# Patient Record
Sex: FEMALE | Race: WHITE | NOT HISPANIC OR LATINO | Employment: OTHER | URBAN - NONMETROPOLITAN AREA
[De-identification: names, ages, dates, MRNs, and addresses within clinical notes are randomized per-mention and may not be internally consistent; named-entity substitution may affect disease eponyms.]

---

## 2018-01-01 ENCOUNTER — APPOINTMENT (OUTPATIENT)
Dept: GENERAL RADIOLOGY | Facility: HOSPITAL | Age: 83
End: 2018-01-01

## 2018-01-01 ENCOUNTER — HOSPITAL ENCOUNTER (INPATIENT)
Facility: HOSPITAL | Age: 83
LOS: 5 days | Discharge: NURSING, REHAB OR LONG-TERM CARE | End: 2018-10-19
Attending: FAMILY MEDICINE | Admitting: HOSPITALIST

## 2018-01-01 ENCOUNTER — APPOINTMENT (OUTPATIENT)
Dept: CARDIOLOGY | Facility: HOSPITAL | Age: 83
End: 2018-01-01
Attending: INTERNAL MEDICINE

## 2018-01-01 VITALS
BODY MASS INDEX: 18.48 KG/M2 | DIASTOLIC BLOOD PRESSURE: 64 MMHG | RESPIRATION RATE: 20 BRPM | TEMPERATURE: 98.1 F | WEIGHT: 104.3 LBS | SYSTOLIC BLOOD PRESSURE: 155 MMHG | HEART RATE: 76 BPM | OXYGEN SATURATION: 96 % | HEIGHT: 63 IN

## 2018-01-01 DIAGNOSIS — J96.21 ACUTE ON CHRONIC RESPIRATORY FAILURE WITH HYPOXIA (HCC): Primary | ICD-10-CM

## 2018-01-01 DIAGNOSIS — R13.12 OROPHARYNGEAL DYSPHAGIA: ICD-10-CM

## 2018-01-01 DIAGNOSIS — Z78.9 IMPAIRED MOBILITY AND ADLS: ICD-10-CM

## 2018-01-01 DIAGNOSIS — I21.4 NSTEMI (NON-ST ELEVATED MYOCARDIAL INFARCTION) (HCC): ICD-10-CM

## 2018-01-01 DIAGNOSIS — Z74.09 IMPAIRED FUNCTIONAL MOBILITY, BALANCE, GAIT, AND ENDURANCE: ICD-10-CM

## 2018-01-01 DIAGNOSIS — Z74.09 IMPAIRED MOBILITY AND ADLS: ICD-10-CM

## 2018-01-01 DIAGNOSIS — J18.9 PNEUMONIA DUE TO INFECTIOUS ORGANISM, UNSPECIFIED LATERALITY, UNSPECIFIED PART OF LUNG: ICD-10-CM

## 2018-01-01 LAB
ALBUMIN SERPL-MCNC: 3.7 G/DL (ref 3.4–4.8)
ALBUMIN SERPL-MCNC: 4 G/DL (ref 3.4–4.8)
ALBUMIN/GLOB SERPL: 1.1 G/DL (ref 1.1–1.8)
ALBUMIN/GLOB SERPL: 1.1 G/DL (ref 1.1–1.8)
ALP SERPL-CCNC: 154 U/L (ref 38–126)
ALP SERPL-CCNC: 166 U/L (ref 38–126)
ALT SERPL W P-5'-P-CCNC: 60 U/L (ref 9–52)
ALT SERPL W P-5'-P-CCNC: 68 U/L (ref 9–52)
ANION GAP SERPL CALCULATED.3IONS-SCNC: 10 MMOL/L (ref 5–15)
ANION GAP SERPL CALCULATED.3IONS-SCNC: 12 MMOL/L (ref 5–15)
ANION GAP SERPL CALCULATED.3IONS-SCNC: 5 MMOL/L (ref 5–15)
ARTERIAL PATENCY WRIST A: ABNORMAL
AST SERPL-CCNC: 51 U/L (ref 14–36)
AST SERPL-CCNC: 56 U/L (ref 14–36)
ATMOSPHERIC PRESS: 748 MMHG
B PERT DNA SPEC QL NAA+PROBE: NOT DETECTED
BACTERIA SPEC AEROBE CULT: NORMAL
BACTERIA SPEC AEROBE CULT: NORMAL
BASE EXCESS BLDA CALC-SCNC: -0.3 MMOL/L (ref 0–2)
BASOPHILS # BLD AUTO: 0.01 10*3/MM3 (ref 0–0.2)
BASOPHILS # BLD AUTO: 0.05 10*3/MM3 (ref 0–0.2)
BASOPHILS NFR BLD AUTO: 0.1 % (ref 0–2)
BASOPHILS NFR BLD AUTO: 0.4 % (ref 0–2)
BDY SITE: ABNORMAL
BH CV ECHO MEAS - ACS: 1.1 CM
BH CV ECHO MEAS - AI DEC SLOPE: 134 CM/SEC^2
BH CV ECHO MEAS - AI MAX PG: 34.8 MMHG
BH CV ECHO MEAS - AI MAX VEL: 295 CM/SEC
BH CV ECHO MEAS - AI P1/2T: 644.8 MSEC
BH CV ECHO MEAS - AO MAX PG (FULL): 31.3 MMHG
BH CV ECHO MEAS - AO MAX PG: 47.3 MMHG
BH CV ECHO MEAS - AO MEAN PG (FULL): 19 MMHG
BH CV ECHO MEAS - AO MEAN PG: 25 MMHG
BH CV ECHO MEAS - AO ROOT AREA (BSA CORRECTED): 1.7
BH CV ECHO MEAS - AO ROOT AREA: 4.9 CM^2
BH CV ECHO MEAS - AO ROOT DIAM: 2.5 CM
BH CV ECHO MEAS - AO V2 MAX: 344 CM/SEC
BH CV ECHO MEAS - AO V2 MEAN: 222 CM/SEC
BH CV ECHO MEAS - AO V2 VTI: 63.2 CM
BH CV ECHO MEAS - ASC AORTA: 3.2 CM
BH CV ECHO MEAS - AVA(I,A): 1.9 CM^2
BH CV ECHO MEAS - AVA(I,D): 1.9 CM^2
BH CV ECHO MEAS - AVA(V,A): 2 CM^2
BH CV ECHO MEAS - AVA(V,D): 2 CM^2
BH CV ECHO MEAS - BSA(HAYCOCK): 1.4 M^2
BH CV ECHO MEAS - BSA: 1.5 M^2
BH CV ECHO MEAS - BZI_BMI: 18.6 KILOGRAMS/M^2
BH CV ECHO MEAS - BZI_METRIC_HEIGHT: 160 CM
BH CV ECHO MEAS - BZI_METRIC_WEIGHT: 47.6 KG
BH CV ECHO MEAS - EDV(CUBED): 35.9 ML
BH CV ECHO MEAS - EDV(TEICH): 44.1 ML
BH CV ECHO MEAS - EF(CUBED): 92.2 %
BH CV ECHO MEAS - EF(TEICH): 88.3 %
BH CV ECHO MEAS - ESV(CUBED): 2.8 ML
BH CV ECHO MEAS - ESV(TEICH): 5.2 ML
BH CV ECHO MEAS - FS: 57.3 %
BH CV ECHO MEAS - IVS/LVPW: 0.93
BH CV ECHO MEAS - IVSD: 1.6 CM
BH CV ECHO MEAS - LA DIMENSION: 3.7 CM
BH CV ECHO MEAS - LA/AO: 1.5
BH CV ECHO MEAS - LV MASS(C)D: 217.6 GRAMS
BH CV ECHO MEAS - LV MASS(C)DI: 148 GRAMS/M^2
BH CV ECHO MEAS - LV MAX PG: 16 MMHG
BH CV ECHO MEAS - LV MEAN PG: 6 MMHG
BH CV ECHO MEAS - LV V1 MAX: 200 CM/SEC
BH CV ECHO MEAS - LV V1 MEAN: 108 CM/SEC
BH CV ECHO MEAS - LV V1 VTI: 34.5 CM
BH CV ECHO MEAS - LVIDD: 3.3 CM
BH CV ECHO MEAS - LVIDS: 1.4 CM
BH CV ECHO MEAS - LVOT AREA (M): 3.5 CM^2
BH CV ECHO MEAS - LVOT AREA: 3.5 CM^2
BH CV ECHO MEAS - LVOT DIAM: 2.1 CM
BH CV ECHO MEAS - LVPWD: 1.8 CM
BH CV ECHO MEAS - MR MAX PG: 67.9 MMHG
BH CV ECHO MEAS - MR MAX VEL: 412 CM/SEC
BH CV ECHO MEAS - MV E MAX VEL: 146 CM/SEC
BH CV ECHO MEAS - MV MAX PG: 9.4 MMHG
BH CV ECHO MEAS - MV MEAN PG: 3 MMHG
BH CV ECHO MEAS - MV V2 MAX: 153 CM/SEC
BH CV ECHO MEAS - MV V2 MEAN: 68.1 CM/SEC
BH CV ECHO MEAS - MV V2 VTI: 38.5 CM
BH CV ECHO MEAS - MVA(VTI): 3.1 CM^2
BH CV ECHO MEAS - PA MAX PG: 2.7 MMHG
BH CV ECHO MEAS - PA V2 MAX: 82 CM/SEC
BH CV ECHO MEAS - RAP SYSTOLE: 10 MMHG
BH CV ECHO MEAS - RVDD: 2.4 CM
BH CV ECHO MEAS - RVSP: 71.2 MMHG
BH CV ECHO MEAS - SI(AO): 211 ML/M^2
BH CV ECHO MEAS - SI(CUBED): 22.5 ML/M^2
BH CV ECHO MEAS - SI(LVOT): 81.3 ML/M^2
BH CV ECHO MEAS - SI(TEICH): 26.5 ML/M^2
BH CV ECHO MEAS - SV(AO): 310.2 ML
BH CV ECHO MEAS - SV(CUBED): 33.1 ML
BH CV ECHO MEAS - SV(LVOT): 119.5 ML
BH CV ECHO MEAS - SV(TEICH): 39 ML
BH CV ECHO MEAS - TR MAX VEL: 391 CM/SEC
BILIRUB SERPL-MCNC: 0.7 MG/DL (ref 0.2–1.3)
BILIRUB SERPL-MCNC: 0.8 MG/DL (ref 0.2–1.3)
BUN BLD-MCNC: 20 MG/DL (ref 7–21)
BUN BLD-MCNC: 28 MG/DL (ref 7–21)
BUN BLD-MCNC: 31 MG/DL (ref 7–21)
BUN/CREAT SERPL: 22 (ref 7–25)
BUN/CREAT SERPL: 28.7 (ref 7–25)
BUN/CREAT SERPL: 29.2 (ref 7–25)
C PNEUM DNA NPH QL NAA+NON-PROBE: NOT DETECTED
CALCIUM SPEC-SCNC: 8.8 MG/DL (ref 8.4–10.2)
CALCIUM SPEC-SCNC: 9.3 MG/DL (ref 8.4–10.2)
CALCIUM SPEC-SCNC: 9.4 MG/DL (ref 8.4–10.2)
CHLORIDE SERPL-SCNC: 94 MMOL/L (ref 95–110)
CHLORIDE SERPL-SCNC: 97 MMOL/L (ref 95–110)
CHLORIDE SERPL-SCNC: 97 MMOL/L (ref 95–110)
CK MB SERPL-CCNC: 1.88 NG/ML (ref 0–5)
CK SERPL-CCNC: 38 U/L (ref 30–135)
CO2 SERPL-SCNC: 26 MMOL/L (ref 22–31)
CO2 SERPL-SCNC: 29 MMOL/L (ref 22–31)
CO2 SERPL-SCNC: 35 MMOL/L (ref 22–31)
CREAT BLD-MCNC: 0.91 MG/DL (ref 0.5–1)
CREAT BLD-MCNC: 0.96 MG/DL (ref 0.5–1)
CREAT BLD-MCNC: 1.08 MG/DL (ref 0.5–1)
DEPRECATED RDW RBC AUTO: 45.9 FL (ref 36.4–46.3)
DEPRECATED RDW RBC AUTO: 46.3 FL (ref 36.4–46.3)
EOSINOPHIL # BLD AUTO: 0 10*3/MM3 (ref 0–0.7)
EOSINOPHIL # BLD AUTO: 0.07 10*3/MM3 (ref 0–0.7)
EOSINOPHIL NFR BLD AUTO: 0 % (ref 0–7)
EOSINOPHIL NFR BLD AUTO: 0.6 % (ref 0–7)
ERYTHROCYTE [DISTWIDTH] IN BLOOD BY AUTOMATED COUNT: 14 % (ref 11.5–14.5)
ERYTHROCYTE [DISTWIDTH] IN BLOOD BY AUTOMATED COUNT: 14 % (ref 11.5–14.5)
FLUAV H1 2009 PAND RNA NPH QL NAA+PROBE: NOT DETECTED
FLUAV H1 HA GENE NPH QL NAA+PROBE: NOT DETECTED
FLUAV H3 RNA NPH QL NAA+PROBE: NOT DETECTED
FLUAV SUBTYP SPEC NAA+PROBE: NOT DETECTED
FLUBV RNA ISLT QL NAA+PROBE: NOT DETECTED
GAS FLOW AIRWAY: 5 LPM
GFR SERPL CREATININE-BSD FRML MDRD: 47 ML/MIN/1.73 (ref 39–90)
GFR SERPL CREATININE-BSD FRML MDRD: 54 ML/MIN/1.73 (ref 39–90)
GFR SERPL CREATININE-BSD FRML MDRD: 58 ML/MIN/1.73 (ref 39–90)
GLOBULIN UR ELPH-MCNC: 3.4 GM/DL (ref 2.3–3.5)
GLOBULIN UR ELPH-MCNC: 3.5 GM/DL (ref 2.3–3.5)
GLUCOSE BLD-MCNC: 138 MG/DL (ref 60–100)
GLUCOSE BLD-MCNC: 172 MG/DL (ref 60–100)
GLUCOSE BLD-MCNC: 213 MG/DL (ref 60–100)
HADV DNA SPEC NAA+PROBE: NOT DETECTED
HCO3 BLDA-SCNC: 26.6 MMOL/L (ref 20–26)
HCOV 229E RNA SPEC QL NAA+PROBE: NOT DETECTED
HCOV HKU1 RNA SPEC QL NAA+PROBE: NOT DETECTED
HCOV NL63 RNA SPEC QL NAA+PROBE: NOT DETECTED
HCOV OC43 RNA SPEC QL NAA+PROBE: NOT DETECTED
HCT VFR BLD AUTO: 37.1 % (ref 35–45)
HCT VFR BLD AUTO: 38.3 % (ref 35–45)
HGB BLD-MCNC: 12 G/DL (ref 12–15.5)
HGB BLD-MCNC: 12.6 G/DL (ref 12–15.5)
HMPV RNA NPH QL NAA+NON-PROBE: NOT DETECTED
HOLD SPECIMEN: NORMAL
HOLD SPECIMEN: NORMAL
HPIV1 RNA SPEC QL NAA+PROBE: NOT DETECTED
HPIV2 RNA SPEC QL NAA+PROBE: NOT DETECTED
HPIV3 RNA NPH QL NAA+PROBE: NOT DETECTED
HPIV4 P GENE NPH QL NAA+PROBE: NOT DETECTED
IMM GRANULOCYTES # BLD: 0.01 10*3/MM3 (ref 0–0.02)
IMM GRANULOCYTES # BLD: 0.02 10*3/MM3 (ref 0–0.02)
IMM GRANULOCYTES NFR BLD: 0.1 % (ref 0–0.5)
IMM GRANULOCYTES NFR BLD: 0.2 % (ref 0–0.5)
INR PPP: 1.04 (ref 0.8–1.2)
L PNEUMO1 AG UR QL IA: NEGATIVE
LYMPHOCYTES # BLD AUTO: 0.7 10*3/MM3 (ref 0.6–4.2)
LYMPHOCYTES # BLD AUTO: 1.38 10*3/MM3 (ref 0.6–4.2)
LYMPHOCYTES NFR BLD AUTO: 12.1 % (ref 10–50)
LYMPHOCYTES NFR BLD AUTO: 7.7 % (ref 10–50)
Lab: ABNORMAL
M PNEUMO IGG SER IA-ACNC: NOT DETECTED
MAGNESIUM SERPL-MCNC: 1.5 MG/DL (ref 1.6–2.3)
MAGNESIUM SERPL-MCNC: 2.7 MG/DL (ref 1.6–2.3)
MAXIMAL PREDICTED HEART RATE: 128 BPM
MCH RBC QN AUTO: 29.3 PG (ref 26.5–34)
MCH RBC QN AUTO: 29.9 PG (ref 26.5–34)
MCHC RBC AUTO-ENTMCNC: 32.3 G/DL (ref 31.4–36)
MCHC RBC AUTO-ENTMCNC: 32.9 G/DL (ref 31.4–36)
MCV RBC AUTO: 90.5 FL (ref 80–98)
MCV RBC AUTO: 90.8 FL (ref 80–98)
MODALITY: ABNORMAL
MONOCYTES # BLD AUTO: 0.66 10*3/MM3 (ref 0–0.9)
MONOCYTES # BLD AUTO: 0.9 10*3/MM3 (ref 0–0.9)
MONOCYTES NFR BLD AUTO: 7.3 % (ref 0–12)
MONOCYTES NFR BLD AUTO: 7.9 % (ref 0–12)
NEUTROPHILS # BLD AUTO: 7.66 10*3/MM3 (ref 2–8.6)
NEUTROPHILS # BLD AUTO: 8.96 10*3/MM3 (ref 2–8.6)
NEUTROPHILS NFR BLD AUTO: 78.9 % (ref 37–80)
NEUTROPHILS NFR BLD AUTO: 84.7 % (ref 37–80)
NRBC BLD MANUAL-RTO: 0 /100 WBC (ref 0–0)
NT-PROBNP SERPL-MCNC: 5970 PG/ML (ref 0–1800)
NT-PROBNP SERPL-MCNC: ABNORMAL PG/ML (ref 0–1800)
PCO2 BLDA: 52.2 MM HG (ref 35–45)
PH BLDA: 7.32 PH UNITS (ref 7.35–7.45)
PLATELET # BLD AUTO: 286 10*3/MM3 (ref 150–450)
PLATELET # BLD AUTO: 333 10*3/MM3 (ref 150–450)
PMV BLD AUTO: 8.9 FL (ref 8–12)
PMV BLD AUTO: 8.9 FL (ref 8–12)
PO2 BLDA: 115 MM HG (ref 83–108)
POTASSIUM BLD-SCNC: 3.5 MMOL/L (ref 3.5–5.1)
POTASSIUM BLD-SCNC: 4.1 MMOL/L (ref 3.5–5.1)
POTASSIUM BLD-SCNC: 5 MMOL/L (ref 3.5–5.1)
PROT SERPL-MCNC: 7.1 G/DL (ref 6.3–8.6)
PROT SERPL-MCNC: 7.5 G/DL (ref 6.3–8.6)
PROTHROMBIN TIME: 13.4 SECONDS (ref 11.1–15.3)
RBC # BLD AUTO: 4.1 10*6/MM3 (ref 3.77–5.16)
RBC # BLD AUTO: 4.22 10*6/MM3 (ref 3.77–5.16)
RHINOVIRUS RNA SPEC NAA+PROBE: NOT DETECTED
RSV RNA NPH QL NAA+NON-PROBE: NOT DETECTED
S PNEUM AG SPEC QL LA: NEGATIVE
SAO2 % BLDCOA: 98 % (ref 94–99)
SODIUM BLD-SCNC: 132 MMOL/L (ref 137–145)
SODIUM BLD-SCNC: 136 MMOL/L (ref 137–145)
SODIUM BLD-SCNC: 137 MMOL/L (ref 137–145)
STRESS TARGET HR: 109 BPM
TROPONIN I SERPL-MCNC: 0.05 NG/ML
TROPONIN I SERPL-MCNC: 0.1 NG/ML
VENTILATOR MODE: ABNORMAL
WBC NRBC COR # BLD: 11.37 10*3/MM3 (ref 3.2–9.8)
WBC NRBC COR # BLD: 9.05 10*3/MM3 (ref 3.2–9.8)
WHOLE BLOOD HOLD SPECIMEN: NORMAL
WHOLE BLOOD HOLD SPECIMEN: NORMAL

## 2018-01-01 PROCEDURE — 93306 TTE W/DOPPLER COMPLETE: CPT

## 2018-01-01 PROCEDURE — 97530 THERAPEUTIC ACTIVITIES: CPT

## 2018-01-01 PROCEDURE — 80048 BASIC METABOLIC PNL TOTAL CA: CPT | Performed by: INTERNAL MEDICINE

## 2018-01-01 PROCEDURE — G8988 SELF CARE GOAL STATUS: HCPCS

## 2018-01-01 PROCEDURE — 94799 UNLISTED PULMONARY SVC/PX: CPT

## 2018-01-01 PROCEDURE — 25010000002 MAGNESIUM SULFATE 2 GM/50ML SOLUTION: Performed by: INTERNAL MEDICINE

## 2018-01-01 PROCEDURE — 99285 EMERGENCY DEPT VISIT HI MDM: CPT

## 2018-01-01 PROCEDURE — 93005 ELECTROCARDIOGRAM TRACING: CPT | Performed by: FAMILY MEDICINE

## 2018-01-01 PROCEDURE — 83735 ASSAY OF MAGNESIUM: CPT | Performed by: INTERNAL MEDICINE

## 2018-01-01 PROCEDURE — 87040 BLOOD CULTURE FOR BACTERIA: CPT | Performed by: FAMILY MEDICINE

## 2018-01-01 PROCEDURE — 25010000002 FUROSEMIDE PER 20 MG: Performed by: INTERNAL MEDICINE

## 2018-01-01 PROCEDURE — 84484 ASSAY OF TROPONIN QUANT: CPT | Performed by: FAMILY MEDICINE

## 2018-01-01 PROCEDURE — G8996 SWALLOW CURRENT STATUS: HCPCS | Performed by: SPEECH-LANGUAGE PATHOLOGIST

## 2018-01-01 PROCEDURE — G8979 MOBILITY GOAL STATUS: HCPCS

## 2018-01-01 PROCEDURE — 94660 CPAP INITIATION&MGMT: CPT

## 2018-01-01 PROCEDURE — 82550 ASSAY OF CK (CPK): CPT | Performed by: INTERNAL MEDICINE

## 2018-01-01 PROCEDURE — 80053 COMPREHEN METABOLIC PANEL: CPT | Performed by: FAMILY MEDICINE

## 2018-01-01 PROCEDURE — 25010000002 LORAZEPAM PER 2 MG: Performed by: INTERNAL MEDICINE

## 2018-01-01 PROCEDURE — 87633 RESP VIRUS 12-25 TARGETS: CPT | Performed by: FAMILY MEDICINE

## 2018-01-01 PROCEDURE — 93010 ELECTROCARDIOGRAM REPORT: CPT | Performed by: INTERNAL MEDICINE

## 2018-01-01 PROCEDURE — 25010000002 LEVOFLOXACIN PER 250 MG: Performed by: FAMILY MEDICINE

## 2018-01-01 PROCEDURE — 97110 THERAPEUTIC EXERCISES: CPT

## 2018-01-01 PROCEDURE — 83880 ASSAY OF NATRIURETIC PEPTIDE: CPT | Performed by: INTERNAL MEDICINE

## 2018-01-01 PROCEDURE — 85025 COMPLETE CBC W/AUTO DIFF WBC: CPT | Performed by: FAMILY MEDICINE

## 2018-01-01 PROCEDURE — 25010000002 FUROSEMIDE PER 20 MG: Performed by: FAMILY MEDICINE

## 2018-01-01 PROCEDURE — 97166 OT EVAL MOD COMPLEX 45 MIN: CPT

## 2018-01-01 PROCEDURE — 85610 PROTHROMBIN TIME: CPT | Performed by: FAMILY MEDICINE

## 2018-01-01 PROCEDURE — G8978 MOBILITY CURRENT STATUS: HCPCS

## 2018-01-01 PROCEDURE — 93005 ELECTROCARDIOGRAM TRACING: CPT | Performed by: EMERGENCY MEDICINE

## 2018-01-01 PROCEDURE — 25010000002 HYDRALAZINE PER 20 MG: Performed by: FAMILY MEDICINE

## 2018-01-01 PROCEDURE — 97162 PT EVAL MOD COMPLEX 30 MIN: CPT

## 2018-01-01 PROCEDURE — G8998 SWALLOW D/C STATUS: HCPCS | Performed by: SPEECH-LANGUAGE PATHOLOGIST

## 2018-01-01 PROCEDURE — 94760 N-INVAS EAR/PLS OXIMETRY 1: CPT

## 2018-01-01 PROCEDURE — 94640 AIRWAY INHALATION TREATMENT: CPT

## 2018-01-01 PROCEDURE — 25010000002 ONDANSETRON PER 1 MG: Performed by: FAMILY MEDICINE

## 2018-01-01 PROCEDURE — 82803 BLOOD GASES ANY COMBINATION: CPT

## 2018-01-01 PROCEDURE — 36600 WITHDRAWAL OF ARTERIAL BLOOD: CPT

## 2018-01-01 PROCEDURE — 97535 SELF CARE MNGMENT TRAINING: CPT

## 2018-01-01 PROCEDURE — 36415 COLL VENOUS BLD VENIPUNCTURE: CPT | Performed by: FAMILY MEDICINE

## 2018-01-01 PROCEDURE — 87899 AGENT NOS ASSAY W/OPTIC: CPT | Performed by: FAMILY MEDICINE

## 2018-01-01 PROCEDURE — 97116 GAIT TRAINING THERAPY: CPT

## 2018-01-01 PROCEDURE — 92526 ORAL FUNCTION THERAPY: CPT | Performed by: SPEECH-LANGUAGE PATHOLOGIST

## 2018-01-01 PROCEDURE — 25010000002 PIPERACILLIN SOD-TAZOBACTAM PER 1 G: Performed by: FAMILY MEDICINE

## 2018-01-01 PROCEDURE — 83880 ASSAY OF NATRIURETIC PEPTIDE: CPT | Performed by: FAMILY MEDICINE

## 2018-01-01 PROCEDURE — 87581 M.PNEUMON DNA AMP PROBE: CPT | Performed by: FAMILY MEDICINE

## 2018-01-01 PROCEDURE — 87486 CHLMYD PNEUM DNA AMP PROBE: CPT | Performed by: FAMILY MEDICINE

## 2018-01-01 PROCEDURE — 83735 ASSAY OF MAGNESIUM: CPT | Performed by: HOSPITALIST

## 2018-01-01 PROCEDURE — 93306 TTE W/DOPPLER COMPLETE: CPT | Performed by: INTERNAL MEDICINE

## 2018-01-01 PROCEDURE — 82553 CREATINE MB FRACTION: CPT | Performed by: INTERNAL MEDICINE

## 2018-01-01 PROCEDURE — 92610 EVALUATE SWALLOWING FUNCTION: CPT | Performed by: SPEECH-LANGUAGE PATHOLOGIST

## 2018-01-01 PROCEDURE — 84484 ASSAY OF TROPONIN QUANT: CPT | Performed by: INTERNAL MEDICINE

## 2018-01-01 PROCEDURE — 71045 X-RAY EXAM CHEST 1 VIEW: CPT

## 2018-01-01 PROCEDURE — G8997 SWALLOW GOAL STATUS: HCPCS | Performed by: SPEECH-LANGUAGE PATHOLOGIST

## 2018-01-01 PROCEDURE — 87798 DETECT AGENT NOS DNA AMP: CPT | Performed by: FAMILY MEDICINE

## 2018-01-01 PROCEDURE — G8987 SELF CARE CURRENT STATUS: HCPCS

## 2018-01-01 RX ORDER — MAGNESIUM SULFATE HEPTAHYDRATE 40 MG/ML
2 INJECTION, SOLUTION INTRAVENOUS AS NEEDED
Status: DISCONTINUED | OUTPATIENT
Start: 2018-01-01 | End: 2018-01-01 | Stop reason: HOSPADM

## 2018-01-01 RX ORDER — HYDRALAZINE HYDROCHLORIDE 20 MG/ML
10 INJECTION INTRAMUSCULAR; INTRAVENOUS ONCE
Status: COMPLETED | OUTPATIENT
Start: 2018-01-01 | End: 2018-01-01

## 2018-01-01 RX ORDER — OMEPRAZOLE 20 MG/1
20 CAPSULE, DELAYED RELEASE ORAL DAILY
COMMUNITY

## 2018-01-01 RX ORDER — LEVOFLOXACIN 250 MG/1
250 TABLET ORAL EVERY OTHER DAY
Status: COMPLETED | OUTPATIENT
Start: 2018-01-01 | End: 2018-01-01

## 2018-01-01 RX ORDER — DIGOXIN 125 MCG
125 TABLET ORAL
Status: DISCONTINUED | OUTPATIENT
Start: 2018-01-01 | End: 2018-01-01 | Stop reason: HOSPADM

## 2018-01-01 RX ORDER — ASPIRIN 325 MG
325 TABLET ORAL ONCE
Status: COMPLETED | OUTPATIENT
Start: 2018-01-01 | End: 2018-01-01

## 2018-01-01 RX ORDER — DIGOXIN 125 MCG
125 TABLET ORAL
COMMUNITY

## 2018-01-01 RX ORDER — LISINOPRIL 5 MG/1
5 TABLET ORAL DAILY
Status: DISCONTINUED | OUTPATIENT
Start: 2018-01-01 | End: 2018-01-01 | Stop reason: HOSPADM

## 2018-01-01 RX ORDER — FUROSEMIDE 10 MG/ML
20 INJECTION INTRAMUSCULAR; INTRAVENOUS EVERY 12 HOURS
Status: DISCONTINUED | OUTPATIENT
Start: 2018-01-01 | End: 2018-01-01 | Stop reason: HOSPADM

## 2018-01-01 RX ORDER — LEVOFLOXACIN 5 MG/ML
500 INJECTION, SOLUTION INTRAVENOUS
Status: DISCONTINUED | OUTPATIENT
Start: 2018-01-01 | End: 2018-01-01

## 2018-01-01 RX ORDER — ONDANSETRON 2 MG/ML
4 INJECTION INTRAMUSCULAR; INTRAVENOUS ONCE
Status: COMPLETED | OUTPATIENT
Start: 2018-01-01 | End: 2018-01-01

## 2018-01-01 RX ORDER — MECLIZINE HCL 12.5 MG/1
12.5 TABLET ORAL 2 TIMES DAILY
Status: DISCONTINUED | OUTPATIENT
Start: 2018-01-01 | End: 2018-01-01 | Stop reason: HOSPADM

## 2018-01-01 RX ORDER — FUROSEMIDE 10 MG/ML
20 INJECTION INTRAMUSCULAR; INTRAVENOUS ONCE
Status: COMPLETED | OUTPATIENT
Start: 2018-01-01 | End: 2018-01-01

## 2018-01-01 RX ORDER — SODIUM CHLORIDE 0.9 % (FLUSH) 0.9 %
3 SYRINGE (ML) INJECTION EVERY 12 HOURS SCHEDULED
Status: DISCONTINUED | OUTPATIENT
Start: 2018-01-01 | End: 2018-01-01 | Stop reason: HOSPADM

## 2018-01-01 RX ORDER — MULTIPLE VITAMINS W/ MINERALS TAB 9MG-400MCG
1 TAB ORAL DAILY
COMMUNITY

## 2018-01-01 RX ORDER — IPRATROPIUM BROMIDE AND ALBUTEROL SULFATE 2.5; .5 MG/3ML; MG/3ML
3 SOLUTION RESPIRATORY (INHALATION)
Status: DISCONTINUED | OUTPATIENT
Start: 2018-01-01 | End: 2018-01-01 | Stop reason: SDUPTHER

## 2018-01-01 RX ORDER — LISINOPRIL 5 MG/1
5 TABLET ORAL DAILY
COMMUNITY

## 2018-01-01 RX ORDER — SODIUM CHLORIDE 9 MG/ML
INJECTION, SOLUTION INTRAVENOUS
Status: DISCONTINUED
Start: 2018-01-01 | End: 2018-01-01 | Stop reason: HOSPADM

## 2018-01-01 RX ORDER — ASPIRIN 81 MG/1
81 TABLET, CHEWABLE ORAL DAILY
COMMUNITY

## 2018-01-01 RX ORDER — LEVOTHYROXINE SODIUM 0.07 MG/1
37.5 TABLET ORAL DAILY
COMMUNITY

## 2018-01-01 RX ORDER — FUROSEMIDE 10 MG/ML
20 INJECTION INTRAMUSCULAR; INTRAVENOUS DAILY
Status: DISCONTINUED | OUTPATIENT
Start: 2018-01-01 | End: 2018-01-01

## 2018-01-01 RX ORDER — ATORVASTATIN CALCIUM 20 MG/1
20 TABLET, FILM COATED ORAL 3 TIMES WEEKLY
Status: DISCONTINUED | OUTPATIENT
Start: 2018-01-01 | End: 2018-01-01 | Stop reason: HOSPADM

## 2018-01-01 RX ORDER — IPRATROPIUM BROMIDE AND ALBUTEROL SULFATE 2.5; .5 MG/3ML; MG/3ML
3 SOLUTION RESPIRATORY (INHALATION)
Qty: 360 ML | Refills: 0 | Status: SHIPPED | OUTPATIENT
Start: 2018-01-01

## 2018-01-01 RX ORDER — LEVOFLOXACIN 5 MG/ML
250 INJECTION, SOLUTION INTRAVENOUS
Status: DISCONTINUED | OUTPATIENT
Start: 2018-01-01 | End: 2018-01-01 | Stop reason: SDUPTHER

## 2018-01-01 RX ORDER — LORAZEPAM 2 MG/ML
0.5 INJECTION INTRAMUSCULAR EVERY 8 HOURS PRN
Status: DISCONTINUED | OUTPATIENT
Start: 2018-01-01 | End: 2018-01-01 | Stop reason: HOSPADM

## 2018-01-01 RX ORDER — LEVOTHYROXINE SODIUM 0.07 MG/1
37.5 TABLET ORAL
Status: DISCONTINUED | OUTPATIENT
Start: 2018-01-01 | End: 2018-01-01 | Stop reason: HOSPADM

## 2018-01-01 RX ORDER — MECLIZINE HCL 12.5 MG/1
12.5 TABLET ORAL 2 TIMES DAILY
COMMUNITY

## 2018-01-01 RX ORDER — SODIUM CHLORIDE 0.9 % (FLUSH) 0.9 %
10 SYRINGE (ML) INJECTION AS NEEDED
Status: DISCONTINUED | OUTPATIENT
Start: 2018-01-01 | End: 2018-01-01 | Stop reason: SDUPTHER

## 2018-01-01 RX ORDER — MONTELUKAST SODIUM 10 MG/1
10 TABLET ORAL NIGHTLY
Status: DISCONTINUED | OUTPATIENT
Start: 2018-01-01 | End: 2018-01-01 | Stop reason: HOSPADM

## 2018-01-01 RX ORDER — LISINOPRIL 5 MG/1
5 TABLET ORAL ONCE
Status: COMPLETED | OUTPATIENT
Start: 2018-01-01 | End: 2018-01-01

## 2018-01-01 RX ORDER — SODIUM CHLORIDE 0.9 % (FLUSH) 0.9 %
3-10 SYRINGE (ML) INJECTION AS NEEDED
Status: DISCONTINUED | OUTPATIENT
Start: 2018-01-01 | End: 2018-01-01 | Stop reason: HOSPADM

## 2018-01-01 RX ORDER — CHLORAL HYDRATE 500 MG
1 CAPSULE ORAL DAILY
COMMUNITY

## 2018-01-01 RX ORDER — MAGNESIUM SULFATE HEPTAHYDRATE 40 MG/ML
4 INJECTION, SOLUTION INTRAVENOUS AS NEEDED
Status: DISCONTINUED | OUTPATIENT
Start: 2018-01-01 | End: 2018-01-01 | Stop reason: HOSPADM

## 2018-01-01 RX ORDER — IPRATROPIUM BROMIDE AND ALBUTEROL SULFATE 2.5; .5 MG/3ML; MG/3ML
3 SOLUTION RESPIRATORY (INHALATION)
Status: DISCONTINUED | OUTPATIENT
Start: 2018-01-01 | End: 2018-01-01 | Stop reason: HOSPADM

## 2018-01-01 RX ORDER — NITROGLYCERIN 0.4 MG/1
0.4 TABLET SUBLINGUAL
Status: DISCONTINUED | OUTPATIENT
Start: 2018-01-01 | End: 2018-01-01 | Stop reason: HOSPADM

## 2018-01-01 RX ORDER — PANTOPRAZOLE SODIUM 40 MG/1
40 TABLET, DELAYED RELEASE ORAL EVERY MORNING
Status: DISCONTINUED | OUTPATIENT
Start: 2018-01-01 | End: 2018-01-01 | Stop reason: HOSPADM

## 2018-01-01 RX ORDER — ASPIRIN 81 MG/1
81 TABLET, CHEWABLE ORAL DAILY
Status: DISCONTINUED | OUTPATIENT
Start: 2018-01-01 | End: 2018-01-01 | Stop reason: HOSPADM

## 2018-01-01 RX ORDER — ATORVASTATIN CALCIUM 20 MG/1
20 TABLET, FILM COATED ORAL 3 TIMES WEEKLY
COMMUNITY

## 2018-01-01 RX ORDER — MONTELUKAST SODIUM 10 MG/1
10 TABLET ORAL NIGHTLY
COMMUNITY

## 2018-01-01 RX ADMIN — NYSTATIN 500000 UNITS: 500000 SUSPENSION ORAL at 08:23

## 2018-01-01 RX ADMIN — SERTRALINE HYDROCHLORIDE 50 MG: 50 TABLET ORAL at 08:58

## 2018-01-01 RX ADMIN — MECLIZINE 12.5 MG: 12.5 TABLET ORAL at 21:55

## 2018-01-01 RX ADMIN — FUROSEMIDE 20 MG: 10 INJECTION, SOLUTION INTRAMUSCULAR; INTRAVENOUS at 20:20

## 2018-01-01 RX ADMIN — PANTOPRAZOLE SODIUM 40 MG: 40 TABLET, DELAYED RELEASE ORAL at 05:37

## 2018-01-01 RX ADMIN — Medication 3 ML: at 10:03

## 2018-01-01 RX ADMIN — IPRATROPIUM BROMIDE AND ALBUTEROL SULFATE 3 ML: 2.5; .5 SOLUTION RESPIRATORY (INHALATION) at 19:32

## 2018-01-01 RX ADMIN — IPRATROPIUM BROMIDE AND ALBUTEROL SULFATE 3 ML: 2.5; .5 SOLUTION RESPIRATORY (INHALATION) at 15:39

## 2018-01-01 RX ADMIN — LISINOPRIL 5 MG: 5 TABLET ORAL at 08:23

## 2018-01-01 RX ADMIN — Medication 3 ML: at 08:59

## 2018-01-01 RX ADMIN — Medication 3 ML: at 20:20

## 2018-01-01 RX ADMIN — IPRATROPIUM BROMIDE AND ALBUTEROL SULFATE 3 ML: 2.5; .5 SOLUTION RESPIRATORY (INHALATION) at 20:23

## 2018-01-01 RX ADMIN — IPRATROPIUM BROMIDE AND ALBUTEROL SULFATE 3 ML: 2.5; .5 SOLUTION RESPIRATORY (INHALATION) at 08:03

## 2018-01-01 RX ADMIN — FUROSEMIDE 20 MG: 10 INJECTION, SOLUTION INTRAMUSCULAR; INTRAVENOUS at 11:52

## 2018-01-01 RX ADMIN — IPRATROPIUM BROMIDE AND ALBUTEROL SULFATE 3 ML: 2.5; .5 SOLUTION RESPIRATORY (INHALATION) at 11:19

## 2018-01-01 RX ADMIN — MONTELUKAST SODIUM 10 MG: 10 TABLET, FILM COATED ORAL at 22:37

## 2018-01-01 RX ADMIN — MECLIZINE 12.5 MG: 12.5 TABLET ORAL at 08:23

## 2018-01-01 RX ADMIN — SERTRALINE HYDROCHLORIDE 50 MG: 50 TABLET ORAL at 10:02

## 2018-01-01 RX ADMIN — Medication 37.5 MCG: at 06:27

## 2018-01-01 RX ADMIN — LEVOFLOXACIN 250 MG: 250 TABLET, FILM COATED ORAL at 08:23

## 2018-01-01 RX ADMIN — MECLIZINE 12.5 MG: 12.5 TABLET ORAL at 20:40

## 2018-01-01 RX ADMIN — SERTRALINE HYDROCHLORIDE 50 MG: 50 TABLET ORAL at 08:23

## 2018-01-01 RX ADMIN — NYSTATIN 500000 UNITS: 500000 SUSPENSION ORAL at 18:55

## 2018-01-01 RX ADMIN — DIGOXIN 125 MCG: 125 TABLET ORAL at 12:50

## 2018-01-01 RX ADMIN — LORAZEPAM 0.5 MG: 2 INJECTION, SOLUTION INTRAMUSCULAR; INTRAVENOUS at 18:36

## 2018-01-01 RX ADMIN — NYSTATIN 500000 UNITS: 500000 SUSPENSION ORAL at 21:44

## 2018-01-01 RX ADMIN — MECLIZINE 12.5 MG: 12.5 TABLET ORAL at 20:19

## 2018-01-01 RX ADMIN — IPRATROPIUM BROMIDE AND ALBUTEROL SULFATE 3 ML: 2.5; .5 SOLUTION RESPIRATORY (INHALATION) at 14:15

## 2018-01-01 RX ADMIN — MECLIZINE 12.5 MG: 12.5 TABLET ORAL at 08:27

## 2018-01-01 RX ADMIN — NYSTATIN 500000 UNITS: 500000 SUSPENSION ORAL at 20:39

## 2018-01-01 RX ADMIN — FUROSEMIDE 20 MG: 10 INJECTION, SOLUTION INTRAMUSCULAR; INTRAVENOUS at 03:17

## 2018-01-01 RX ADMIN — MONTELUKAST SODIUM 10 MG: 10 TABLET, FILM COATED ORAL at 20:19

## 2018-01-01 RX ADMIN — IPRATROPIUM BROMIDE AND ALBUTEROL SULFATE 3 ML: 2.5; .5 SOLUTION RESPIRATORY (INHALATION) at 07:16

## 2018-01-01 RX ADMIN — NYSTATIN 500000 UNITS: 500000 SUSPENSION ORAL at 18:21

## 2018-01-01 RX ADMIN — MONTELUKAST SODIUM 10 MG: 10 TABLET, FILM COATED ORAL at 21:55

## 2018-01-01 RX ADMIN — LEVOFLOXACIN 250 MG: 250 TABLET, FILM COATED ORAL at 10:02

## 2018-01-01 RX ADMIN — MAGNESIUM SULFATE IN WATER 2 G: 40 INJECTION, SOLUTION INTRAVENOUS at 03:04

## 2018-01-01 RX ADMIN — NYSTATIN 500000 UNITS: 500000 SUSPENSION ORAL at 18:07

## 2018-01-01 RX ADMIN — Medication 3 ML: at 08:24

## 2018-01-01 RX ADMIN — IPRATROPIUM BROMIDE AND ALBUTEROL SULFATE 3 ML: 2.5; .5 SOLUTION RESPIRATORY (INHALATION) at 23:48

## 2018-01-01 RX ADMIN — ASPIRIN 81 MG CHEWABLE TABLET 81 MG: 81 TABLET CHEWABLE at 10:02

## 2018-01-01 RX ADMIN — MECLIZINE 12.5 MG: 12.5 TABLET ORAL at 08:58

## 2018-01-01 RX ADMIN — SERTRALINE HYDROCHLORIDE 50 MG: 50 TABLET ORAL at 08:19

## 2018-01-01 RX ADMIN — ATORVASTATIN CALCIUM 20 MG: 20 TABLET, FILM COATED ORAL at 12:21

## 2018-01-01 RX ADMIN — LISINOPRIL 5 MG: 5 TABLET ORAL at 10:03

## 2018-01-01 RX ADMIN — FUROSEMIDE 20 MG: 10 INJECTION, SOLUTION INTRAMUSCULAR; INTRAVENOUS at 20:40

## 2018-01-01 RX ADMIN — MAGNESIUM SULFATE IN WATER 2 G: 40 INJECTION, SOLUTION INTRAVENOUS at 04:53

## 2018-01-01 RX ADMIN — IPRATROPIUM BROMIDE AND ALBUTEROL SULFATE 3 ML: 2.5; .5 SOLUTION RESPIRATORY (INHALATION) at 13:52

## 2018-01-01 RX ADMIN — NYSTATIN 500000 UNITS: 500000 SUSPENSION ORAL at 08:27

## 2018-01-01 RX ADMIN — FUROSEMIDE 20 MG: 10 INJECTION, SOLUTION INTRAMUSCULAR; INTRAVENOUS at 20:39

## 2018-01-01 RX ADMIN — LISINOPRIL 5 MG: 5 TABLET ORAL at 08:19

## 2018-01-01 RX ADMIN — FUROSEMIDE 20 MG: 10 INJECTION, SOLUTION INTRAMUSCULAR; INTRAVENOUS at 08:23

## 2018-01-01 RX ADMIN — DIGOXIN 125 MCG: 125 TABLET ORAL at 13:00

## 2018-01-01 RX ADMIN — MECLIZINE 12.5 MG: 12.5 TABLET ORAL at 22:37

## 2018-01-01 RX ADMIN — IPRATROPIUM BROMIDE AND ALBUTEROL SULFATE 3 ML: 2.5; .5 SOLUTION RESPIRATORY (INHALATION) at 12:47

## 2018-01-01 RX ADMIN — LEVOFLOXACIN 500 MG: 5 INJECTION, SOLUTION INTRAVENOUS at 03:17

## 2018-01-01 RX ADMIN — Medication 3 ML: at 21:22

## 2018-01-01 RX ADMIN — PANTOPRAZOLE SODIUM 40 MG: 40 TABLET, DELAYED RELEASE ORAL at 05:25

## 2018-01-01 RX ADMIN — NYSTATIN 500000 UNITS: 500000 SUSPENSION ORAL at 08:58

## 2018-01-01 RX ADMIN — NYSTATIN 500000 UNITS: 500000 SUSPENSION ORAL at 12:21

## 2018-01-01 RX ADMIN — IPRATROPIUM BROMIDE AND ALBUTEROL SULFATE 3 ML: 2.5; .5 SOLUTION RESPIRATORY (INHALATION) at 21:12

## 2018-01-01 RX ADMIN — IPRATROPIUM BROMIDE AND ALBUTEROL SULFATE 3 ML: 2.5; .5 SOLUTION RESPIRATORY (INHALATION) at 07:34

## 2018-01-01 RX ADMIN — PANTOPRAZOLE SODIUM 40 MG: 40 TABLET, DELAYED RELEASE ORAL at 05:41

## 2018-01-01 RX ADMIN — MONTELUKAST SODIUM 10 MG: 10 TABLET, FILM COATED ORAL at 20:40

## 2018-01-01 RX ADMIN — Medication 3 ML: at 20:40

## 2018-01-01 RX ADMIN — IPRATROPIUM BROMIDE AND ALBUTEROL SULFATE 3 ML: 2.5; .5 SOLUTION RESPIRATORY (INHALATION) at 08:00

## 2018-01-01 RX ADMIN — FUROSEMIDE 20 MG: 10 INJECTION, SOLUTION INTRAMUSCULAR; INTRAVENOUS at 08:19

## 2018-01-01 RX ADMIN — LISINOPRIL 5 MG: 5 TABLET ORAL at 08:27

## 2018-01-01 RX ADMIN — ATORVASTATIN CALCIUM 20 MG: 20 TABLET, FILM COATED ORAL at 08:58

## 2018-01-01 RX ADMIN — NYSTATIN 500000 UNITS: 500000 SUSPENSION ORAL at 20:19

## 2018-01-01 RX ADMIN — IPRATROPIUM BROMIDE AND ALBUTEROL SULFATE 3 ML: 2.5; .5 SOLUTION RESPIRATORY (INHALATION) at 11:10

## 2018-01-01 RX ADMIN — IPRATROPIUM BROMIDE AND ALBUTEROL SULFATE 3 ML: 2.5; .5 SOLUTION RESPIRATORY (INHALATION) at 15:58

## 2018-01-01 RX ADMIN — ATORVASTATIN CALCIUM 20 MG: 20 TABLET, FILM COATED ORAL at 08:19

## 2018-01-01 RX ADMIN — NYSTATIN 500000 UNITS: 500000 SUSPENSION ORAL at 10:02

## 2018-01-01 RX ADMIN — NYSTATIN 500000 UNITS: 500000 SUSPENSION ORAL at 12:50

## 2018-01-01 RX ADMIN — LISINOPRIL 5 MG: 5 TABLET ORAL at 08:58

## 2018-01-01 RX ADMIN — PANTOPRAZOLE SODIUM 40 MG: 40 TABLET, DELAYED RELEASE ORAL at 06:26

## 2018-01-01 RX ADMIN — LISINOPRIL 5 MG: 5 TABLET ORAL at 03:03

## 2018-01-01 RX ADMIN — MONTELUKAST SODIUM 10 MG: 10 TABLET, FILM COATED ORAL at 20:39

## 2018-01-01 RX ADMIN — NYSTATIN 500000 UNITS: 500000 SUSPENSION ORAL at 13:00

## 2018-01-01 RX ADMIN — DIGOXIN 125 MCG: 125 TABLET ORAL at 12:08

## 2018-01-01 RX ADMIN — ONDANSETRON HYDROCHLORIDE 4 MG: 2 INJECTION INTRAMUSCULAR; INTRAVENOUS at 23:48

## 2018-01-01 RX ADMIN — DIGOXIN 125 MCG: 125 TABLET ORAL at 12:21

## 2018-01-01 RX ADMIN — Medication 37.5 MCG: at 05:41

## 2018-01-01 RX ADMIN — FUROSEMIDE 20 MG: 10 INJECTION, SOLUTION INTRAMUSCULAR; INTRAVENOUS at 10:02

## 2018-01-01 RX ADMIN — ASPIRIN 325 MG: 325 TABLET ORAL at 23:20

## 2018-01-01 RX ADMIN — Medication 37.5 MCG: at 05:37

## 2018-01-01 RX ADMIN — PANTOPRAZOLE SODIUM 40 MG: 40 TABLET, DELAYED RELEASE ORAL at 05:31

## 2018-01-01 RX ADMIN — FUROSEMIDE 20 MG: 10 INJECTION, SOLUTION INTRAMUSCULAR; INTRAVENOUS at 21:55

## 2018-01-01 RX ADMIN — HYDRALAZINE HYDROCHLORIDE 10 MG: 20 INJECTION INTRAMUSCULAR; INTRAVENOUS at 23:21

## 2018-01-01 RX ADMIN — MECLIZINE 12.5 MG: 12.5 TABLET ORAL at 20:39

## 2018-01-01 RX ADMIN — IPRATROPIUM BROMIDE AND ALBUTEROL SULFATE 3 ML: 2.5; .5 SOLUTION RESPIRATORY (INHALATION) at 09:50

## 2018-01-01 RX ADMIN — FUROSEMIDE 20 MG: 10 INJECTION, SOLUTION INTRAMUSCULAR; INTRAVENOUS at 08:27

## 2018-01-01 RX ADMIN — SODIUM CHLORIDE 500 ML: 9 INJECTION, SOLUTION INTRAVENOUS at 00:27

## 2018-01-01 RX ADMIN — ASPIRIN 81 MG CHEWABLE TABLET 81 MG: 81 TABLET CHEWABLE at 08:23

## 2018-01-01 RX ADMIN — IPRATROPIUM BROMIDE AND ALBUTEROL SULFATE 3 ML: 2.5; .5 SOLUTION RESPIRATORY (INHALATION) at 21:00

## 2018-01-01 RX ADMIN — ASPIRIN 81 MG CHEWABLE TABLET 81 MG: 81 TABLET CHEWABLE at 08:19

## 2018-01-01 RX ADMIN — ASPIRIN 81 MG CHEWABLE TABLET 81 MG: 81 TABLET CHEWABLE at 08:58

## 2018-01-01 RX ADMIN — Medication 37.5 MCG: at 05:31

## 2018-01-01 RX ADMIN — IPRATROPIUM BROMIDE AND ALBUTEROL SULFATE 3 ML: 2.5; .5 SOLUTION RESPIRATORY (INHALATION) at 00:01

## 2018-01-01 RX ADMIN — IPRATROPIUM BROMIDE AND ALBUTEROL SULFATE 3 ML: 2.5; .5 SOLUTION RESPIRATORY (INHALATION) at 16:11

## 2018-01-01 RX ADMIN — Medication 3 ML: at 08:27

## 2018-01-01 RX ADMIN — MECLIZINE 12.5 MG: 12.5 TABLET ORAL at 10:03

## 2018-01-01 RX ADMIN — MECLIZINE 12.5 MG: 12.5 TABLET ORAL at 08:19

## 2018-01-01 RX ADMIN — ASPIRIN 81 MG CHEWABLE TABLET 81 MG: 81 TABLET CHEWABLE at 08:27

## 2018-01-01 RX ADMIN — IPRATROPIUM BROMIDE AND ALBUTEROL SULFATE 3 ML: 2.5; .5 SOLUTION RESPIRATORY (INHALATION) at 20:21

## 2018-01-01 RX ADMIN — DIGOXIN 125 MCG: 125 TABLET ORAL at 12:30

## 2018-01-01 RX ADMIN — Medication 37.5 MCG: at 05:25

## 2018-01-01 RX ADMIN — SERTRALINE HYDROCHLORIDE 50 MG: 50 TABLET ORAL at 08:27

## 2018-01-01 RX ADMIN — NYSTATIN 500000 UNITS: 500000 SUSPENSION ORAL at 21:56

## 2018-01-01 RX ADMIN — MAGNESIUM SULFATE IN WATER 2 G: 40 INJECTION, SOLUTION INTRAVENOUS at 06:31

## 2018-01-01 RX ADMIN — PIPERACILLIN AND TAZOBACTAM 3.38 G: 3; .375 INJECTION, POWDER, LYOPHILIZED, FOR SOLUTION INTRAVENOUS; PARENTERAL at 00:29

## 2018-01-01 RX ADMIN — Medication 3 ML: at 21:55

## 2018-01-01 RX ADMIN — Medication 3 ML: at 08:19

## 2018-01-01 RX ADMIN — FUROSEMIDE 20 MG: 10 INJECTION, SOLUTION INTRAMUSCULAR; INTRAVENOUS at 08:58

## 2018-01-01 RX ADMIN — Medication 3 ML: at 09:18

## 2018-10-14 PROBLEM — J90 BILATERAL PLEURAL EFFUSION: Chronic | Status: ACTIVE | Noted: 2018-01-01

## 2018-10-14 PROBLEM — E11.9 DIABETES MELLITUS (HCC): Chronic | Status: ACTIVE | Noted: 2018-01-01

## 2018-10-14 PROBLEM — M81.0 OSTEOPOROSIS: Chronic | Status: ACTIVE | Noted: 2018-01-01

## 2018-10-14 PROBLEM — J96.21 ACUTE ON CHRONIC RESPIRATORY FAILURE WITH HYPOXIA (HCC): Status: ACTIVE | Noted: 2018-01-01

## 2018-10-14 PROBLEM — J96.01 ACUTE RESPIRATORY FAILURE WITH HYPOXIA AND HYPERCAPNIA (HCC): Status: ACTIVE | Noted: 2018-01-01

## 2018-10-14 PROBLEM — J96.02 ACUTE RESPIRATORY FAILURE WITH HYPOXIA AND HYPERCAPNIA (HCC): Status: ACTIVE | Noted: 2018-01-01

## 2018-10-14 PROBLEM — J18.9 PNEUMONIA: Status: ACTIVE | Noted: 2018-01-01

## 2018-10-14 PROBLEM — I10 HYPERTENSION: Chronic | Status: ACTIVE | Noted: 2018-01-01

## 2018-10-14 NOTE — H&P
[address] [address]  T - [phone]     H&P         SUBJECTIVE:   Patient Care Team:  Breezy Mcfarland MD as PCP - General    Chief Complaint:     Chief Complaint   Patient presents with   • Shoulder Pain   • Nausea   • Shortness of Breath       Patient is 89 y.o. female presents with past medical history of heart failure, presented to the ED with a complaint of having shortness of air.  Patient uses oxygen at nighttime only.  Most recent echo was done March 2017 shows EF of 55-60%.  On a chest x-ray the patient was found to have pneumonia.  Patient denies any other complain of this point..     HPI     ROS/HISTORY/ CURRENT MEDICATIONS/OBJECTIVE/VS/PE:   Review of Systems:   Review of Systems   Constitutional: Positive for activity change, appetite change and fatigue.   HENT: Negative for congestion and rhinorrhea.    Respiratory: Positive for chest tightness and shortness of breath. Negative for wheezing.    Cardiovascular: Negative for chest pain, palpitations and leg swelling.   Gastrointestinal: Positive for nausea. Negative for abdominal pain and constipation.   Genitourinary: Negative for dysuria and frequency.   Psychiatric/Behavioral: Negative for agitation.       History:     Past Medical History:   Diagnosis Date   • Diabetes mellitus (CMS/[facility]) 10/14/2018   • Hypertension    • Osteoporosis 10/14/2018     History reviewed. No pertinent surgical history.  History reviewed. No pertinent family history.  Social History   Substance Use Topics   • Smoking status: Never Smoker   • Smokeless tobacco: Never Used   • Alcohol use No     No prescriptions prior to admission.     Allergies:  Ativan [lorazepam]; Hydrocodone-acetaminophen; and Prochlorperazine edisylate    Current Medications:     No current facility-administered medications for this encounter.      Current Outpatient Prescriptions   Medication Sig Dispense Refill   • aspirin 81 MG chewable tablet  Chew 81 mg Daily.     • atorvastatin (LIPITOR) 20 MG tablet Take 20 mg by mouth 3 (Three) Times a Week. Monday, Wednesday, Friday at night     • Calcium Carbonate-Vitamin D3 600-400 MG-UNIT tablet Take 1 tablet by mouth Daily.     • digoxin (LANOXIN) 125 MCG tablet Take 125 mcg by mouth Daily.     • levothyroxine (SYNTHROID, LEVOTHROID) 75 MCG tablet Take 37.5 mcg by mouth Daily.     • lisinopril (PRINIVIL,ZESTRIL) 5 MG tablet Take 5 mg by mouth Daily.     • meclizine (ANTIVERT) 12.5 MG tablet Take 12.5 mg by mouth 2 (Two) Times a Day.     • metoprolol tartrate (LOPRESSOR) 25 MG tablet Take 25 mg by mouth 2 (Two) Times a Day.     • montelukast (SINGULAIR) 10 MG tablet Take 10 mg by mouth Every Night.     • Multiple Vitamins-Minerals (MULTIVITAMIN WITH MINERALS) tablet tablet Take 1 tablet by mouth Daily.     • Multiple Vitamins-Minerals (PRESERVISION AREDS 2+MULTI VIT PO) Take 1 tablet by mouth 2 (Two) Times a Day.     • Omega-3 1000 MG capsule Take 1 capsule by mouth Daily.     • omeprazole (priLOSEC) 20 MG capsule Take 20 mg by mouth Daily.     • sertraline (ZOLOFT) 50 MG tablet Take 50 mg by mouth Daily.     • ipratropium-albuterol (DUO-NEB) 0.5-2.5 mg/3 ml nebulizer Take 3 mL by nebulization 4 (Four) Times a Day. 360 mL 0   • nystatin (MYCOSTATIN) 317853 UNIT/ML suspension Swish and swallow 5 mL 4 (Four) Times a Day. 60 mL 0       Physical Exam:     Vital Sign Min/Max for last 24 hours  No Data Recorded   No Data Recorded   No Data Recorded   No Data Recorded   No Data Recorded   No Data Recorded   No Data Recorded   Body mass index is 18.48 kg/m².      Physical Exam:    Physical Exam   Constitutional: She appears well-developed. She appears cachectic.   Eyes: Pupils are equal, round, and reactive to light.   Cardiovascular: Normal rate, regular rhythm and normal heart sounds.    Pulmonary/Chest: She is in respiratory distress. She has wheezes. She has rales.   Currently on BiPAP   Abdominal: Soft. Bowel sounds  are normal.   Musculoskeletal: Normal range of motion.   Neurological: She is alert.   Skin: Skin is warm.   Vitals reviewed.       Results Review:   Lab Results (last 24 hours)     Procedure Component Value Units Date/Time    Blood Culture - Blood, Blood, Venous Line [[id number]]  (Normal) Collected:  10/14/18 0256    Specimen:  Blood from Blood, Venous Line Updated:  10/19/18 0300     Blood Culture No growth at 5 days    Blood Culture - Blood, Blood, Venous Line [[id number]]  (Normal) Collected:  10/14/18 0112    Specimen:  Blood from Arm, Left Updated:  10/19/18 0130     Blood Culture No growth at 5 days              Imaging Results (last 24 hours)     ** No results found for the last 24 hours. **           I reviewed the patient's new clinical results.  I reviewed the patient's new imaging results and agree with the interpretation.     ASSESSMENT/PLAN:   Assessment/Plan   Active Hospital Problems    Diagnosis Date Noted   • **Pulmonary hypertension (CMS/HCC) [I27.20] 10/15/2018   • Aortic valve stenosis [I35.0] 10/15/2018   • Bilateral pleural effusion [J90] 10/14/2018   • Osteoporosis [M81.0] 10/14/2018   • Acute respiratory failure with hypoxia and hypercapnia (CMS/HCC) [J96.01, J96.02] 10/14/2018   • Diabetes mellitus (CMS/HCC) [E11.9] 10/14/2018   • Hypertension [I10] 10/14/2018     1.  Acute on chronic hypoxic respiratory failure: Patient does not appear to be in volume overload, likely due to pneumonia.  Will treat pneumonia.  2.  Pneumonia: Patient will be started on IV antibiotic, will obtain cultures and follow-up sensitivity.    DVT ppx:scd    I discussed the patient's findings and my recommendations with patient, family and nursing staff.              This document has been electronically signed by Terence Mast MD on October 21, 2018 2:11 PM

## 2018-10-14 NOTE — PLAN OF CARE
Problem: Patient Care Overview  Goal: Plan of Care Review  Outcome: Ongoing (interventions implemented as appropriate)   10/14/18 6976   Coping/Psychosocial   Plan of Care Reviewed With patient;son   Plan of Care Review   Progress improving   OTHER   Outcome Summary swallow evaluation completed this date. pt tolerates regular solids /liquids with no s/s of aspiration. she reports choking this am on eggs. educatiion provided. recommend 1 f/u for diet tolerance and education

## 2018-10-14 NOTE — THERAPY EVALUATION
Acute Care - Speech Language Pathology   Swallow Initial Evaluation [facility]     Patient Name: Mariaa Pires  : 4/10/1926  MRN: [id number]  Today's Date: 10/14/2018               Admit Date: 10/13/2018  Swallow evaluation completed on new admission with pneumonia.  Pt c/o choking on eggs this morning.  She was on cpap overnight and placed on nasal cannula for am meal.  She remained on nasal cannula during swallow evaluation.  Son and daughter in law present during evaluation.  Pt is alert and responsive to questions. She is a little confused, but appears shaken by recent choking episode this morning.  Pt did not demonstrate any oral/phayrngeal dysphagia during evluation with coke via cup rim and corinne crackers.  D/t pt anxiety about choking, f/u swallow tx is indicated to complete education that was started this date.    1. Encourage upright posture  2. Moisten mouth prior to PO solids shelley after forced air/nasal cannula use  3. Eat slowly  4. Use cyclic eating  5. One bite/sip at a time (do not drink with food in mouth)  6. Choose soft, moist foods  Goals:  1. Pt to tolerate highest level diet for safe and adequate nutrition/hydration:recommend pt continue on current diet of regular liquids and solids and use above compensatory strategies that were discussed in deapt today with family present.    Visit Dx:     ICD-10-CM ICD-9-CM   1. Acute on chronic respiratory failure with hypoxia (CMS/HCC) J96.21 518.84     799.02   2. Pneumonia due to infectious organism, unspecified laterality, unspecified part of lung J18.9 136.9     484.8   3. NSTEMI (non-ST elevated myocardial infarction) (CMS/HCC) I21.4 410.70   4. Oropharyngeal dysphagia R13.12 787.22     Patient Active Problem List   Diagnosis   • Pneumonia   • Bilateral pleural effusion   • Osteoporosis   • Acute respiratory failure with hypoxia and hypercapnia (CMS/HCC)   • Diabetes mellitus (CMS/HCC)     History reviewed. No pertinent past medical  history.  History reviewed. No pertinent surgical history.       SWALLOW EVALUATION (last 72 hours)      SLP Adult Swallow Evaluation     Row Name 10/14/18 1030                   Rehab Evaluation    Document Type evaluation  -EC        Subjective Information no complaints  -EC        Patient Observations agree to therapy;alert;cooperative  -EC        Patient/Family Observations son and daughter in law present  -EC        Patient Effort good  -EC           General Information    Patient Profile Reviewed yes  -EC        Pertinent History Of Current Problem pt choked on eggs this morning  -EC        Current Method of Nutrition regular textures;thin liquids  -EC        Prior Level of Function-Swallowing no diet consistency restrictions  -EC        Plans/Goals Discussed with patient;patient and family  -EC        Barriers to Rehab none identified  -EC        Patient's Goals for Discharge return to all previous roles/activities  -EC        Family Goals for Discharge other (see comments)   family planning 24/7 help after d/c from hospital  -EC           Pain Assessment    Additional Documentation Pain Scale: Numbers Pre/Post-Treatment (Group)  -EC           Pain Scale: Numbers Pre/Post-Treatment    Pain Scale: Numbers, Pretreatment 0/10 - no pain  -EC        Pain Scale: Numbers, Post-Treatment 0/10 - no pain  -EC           Oral Motor and Function    Dentition Assessment natural, present and adequate  -EC        Secretion Management WNL/WFL  -EC        Mucosal Quality moist, healthy  -EC        Volitional Swallow WFL  -EC           General Eating/Swallowing Observations    Respiratory Support Currently in Use nasal cannula  -EC        Eating/Swallowing Skills self-fed  -EC        Positioning During Eating upright 90 degree  -EC        Utensils Used cup  -EC        Consistencies Trialed regular textures;thin liquids  -EC           Clinical Swallow Eval    Oral Prep Phase WFL  -EC        Oral Transit WFL  -EC        Oral  Residue WFL  -EC        Pharyngeal Phase WFL  -EC        Esophageal Phase unremarkable  -EC        Clinical Swallow Evaluation Summary reports choking this morning  -EC           Clinical Impression    SLP Swallowing Diagnosis mild;oral dysfunction;pharyngeal dysfunction  -EC        Functional Impact risk of aspiration/pneumonia  -EC        Rehab Potential/Prognosis, Swallowing good, to achieve stated therapy goals  -EC        Swallow Criteria for Skilled Therapeutic Interventions Met demonstrates skilled criteria  -EC           Recommendations    Therapy Frequency (Swallow) other (see comments)   1-2 f/u  -EC        Predicted Duration Therapy Intervention (Days) until discharge  -EC        SLP Diet Recommendation regular textures;thin liquids  -EC           Swallow Goals (SLP)    Oral Nutrition/Hydration Goal Selection (SLP) oral nutrition/hydration, SLP goal 1  -EC           Oral Nutrition/Hydration Goal 1 (SLP)    Oral Nutrition/Hydration Goal 1, SLP pt to tolerate highest level diet for safe and adequate nutrition/hydration  -EC        Time Frame (Oral Nutrition/Hydration Goal 1, SLP) by discharge  -EC        Barriers (Oral Nutrition/Hydration Goal 1, SLP) none  -EC        Progress/Outcomes (Oral Nutrition/Hydration Goal 1, SLP) other (see comments)   new goal  -EC          User Key  (r) = Recorded By, (t) = Taken By, (c) = Cosigned By    Initials Name Effective Dates    Amarilis Euceda CCC-SLP 03/07/18 -         EDUCATION  The patient has been educated in the following areas:   Dysphagia (Swallowing Impairment).    SLP Recommendation and Plan  SLP Swallowing Diagnosis: mild, oral dysfunction, pharyngeal dysfunction  SLP Diet Recommendation: regular textures, thin liquids              Swallow Criteria for Skilled Therapeutic Interventions Met: demonstrates skilled criteria     Rehab Potential/Prognosis, Swallowing: good, to achieve stated therapy goals  Therapy Frequency (Swallow): other (see comments)  (1-2 f/u)  Predicted Duration Therapy Intervention (Days): until discharge       Plan of Care Reviewed With: patient, son  Plan of Care Review  Plan of Care Reviewed With: patient, son  Progress: improving  Outcome Summary: swallow evaluation completed this date.  pt tolerates regular solids /liquids with no s/s of aspiration.  she reports choking this am on eggs.  educatiion provided.  recommend 1 f/u for diet tolerance and education          SLP GOALS     Row Name 10/14/18 1030             Oral Nutrition/Hydration Goal 1 (SLP)    Oral Nutrition/Hydration Goal 1, SLP pt to tolerate highest level diet for safe and adequate nutrition/hydration  -EC      Time Frame (Oral Nutrition/Hydration Goal 1, SLP) by discharge  -EC      Barriers (Oral Nutrition/Hydration Goal 1, SLP) none  -EC      Progress/Outcomes (Oral Nutrition/Hydration Goal 1, SLP) other (see comments)   new goal  -EC        User Key  (r) = Recorded By, (t) = Taken By, (c) = Cosigned By    Initials Name Provider Type    EC Amarilis Graham CCC-SLP Speech and Language Pathologist               Time Calculation:         Time Calculation- SLP     Row Name 10/14/18 1258             Time Calculation- SLP    SLP Start Time 1030  -EC      SLP Stop Time 1100  -EC      SLP Time Calculation (min) 30 min  -EC      Total Timed Code Minutes- SLP 30 minute(s)  -EC      SLP Received On 10/14/18  -EC      SLP Goal Re-Cert Due Date 10/28/18  -EC        User Key  (r) = Recorded By, (t) = Taken By, (c) = Cosigned By    Initials Name Provider Type    EC Amarilis Graham CCC-SLP Speech and Language Pathologist          Therapy Charges for Today     Code Description Service Date Service Provider Modifiers Qty    12737367363 HC ST SWALLOWING CURRENT STATUS 10/14/2018 Amarilis Graham CCC-SLP GN, CJ 1    33534957408 HC ST SWALLOWING PROJECTED 10/14/2018 Amarilis Graham CCC-SLP GN, CI 1    48394771192 HC ST EVAL ORAL PHARYNG SWALLOW 2 10/14/2018 Amarilis Graham  A, CCC-SLP GN 1          SLP G-Codes  Functional Limitations: Swallowing  Swallow Current Status (): At least 20 percent but less than 40 percent impaired, limited or restricted  Swallow Goal Status (): At least 1 percent but less than 20 percent impaired, limited or restricted    Amarilis Graham, CCC-SLP  10/14/2018

## 2018-10-14 NOTE — ED PROVIDER NOTES
"Subjective     History provided by:  Patient   used: No    Shortness of Breath   Severity:  Severe  Onset quality:  Gradual  Timing:  Constant  Progression:  Worsening  Chronicity:  Recurrent  Context: activity    Relieved by:  Nothing  Worsened by:  Nothing  Ineffective treatments:  None tried  Associated symptoms: no chest pain        Review of Systems   Respiratory: Positive for shortness of breath.    Cardiovascular: Negative for chest pain.   All other systems reviewed and are negative.      No past medical history on file.    Allergies   Allergen Reactions   • Hydrocodone-Acetaminophen Unknown (See Comments)   • Prochlorperazine Edisylate Unknown (See Comments)       No past surgical history on file.    No family history on file.    Social History     Social History   • Marital status:      Social History Main Topics   • Drug use: Unknown     Other Topics Concern   • Not on file     /74   Pulse 75   Temp 97.4 °F (36.3 °C) (Oral)   Resp (!) 36 Comment: notified MD Rubio pt RR running fast  Ht 160 cm (63\")   Wt 50.8 kg (112 lb)   SpO2 95%   BMI 19.84 kg/m²       Objective   Physical Exam   Constitutional: She is oriented to person, place, and time. She appears well-developed and well-nourished. She appears distressed.   HENT:   Head: Normocephalic.   Right Ear: External ear normal.   Left Ear: External ear normal.   Nose: Nose normal.   Mouth/Throat: Oropharynx is clear and moist.   Neck: Normal range of motion. Neck supple.   Cardiovascular: Normal rate, regular rhythm, normal heart sounds and intact distal pulses.    Pulmonary/Chest: Effort normal and breath sounds normal.   Abdominal: Soft. Bowel sounds are normal.   Musculoskeletal: Normal range of motion.   Neurological: She is alert and oriented to person, place, and time.   Skin: Skin is warm. Capillary refill takes less than 2 seconds.   Nursing note and vitals reviewed.      Procedures           ED Course    "   Patient want to be comfortable. Does not want intubation or resuscitation.    Labs Reviewed   TROPONIN (IN-HOUSE) - Abnormal; Notable for the following:        Result Value    Troponin I 0.047 (*)     All other components within normal limits   COMPREHENSIVE METABOLIC PANEL - Abnormal; Notable for the following:     Glucose 213 (*)     BUN 31 (*)     Creatinine 1.08 (*)     Sodium 132 (*)     Chloride 94 (*)     ALT (SGPT) 68 (*)     AST (SGOT) 56 (*)     Alkaline Phosphatase 166 (*)     BUN/Creatinine Ratio 28.7 (*)     All other components within normal limits    Narrative:     The MDRD GFR formula is only valid for adults with stable renal function between ages 18 and 70.   BNP (IN-HOUSE) - Abnormal; Notable for the following:     proBNP 20,100.0 (*)     All other components within normal limits   CBC WITH AUTO DIFFERENTIAL - Abnormal; Notable for the following:     WBC 11.37 (*)     Neutrophils, Absolute 8.96 (*)     All other components within normal limits   BLOOD GAS, ARTERIAL - Abnormal; Notable for the following:     pH, Arterial 7.316 (*)     pCO2, Arterial 52.2 (*)     pO2, Arterial 115.0 (*)     HCO3, Arterial 26.6 (*)     Base Excess, Arterial -0.3 (*)     All other components within normal limits   PROTIME-INR - Normal    Narrative:     Therapeutic range for most indications is 2.0-3.0 INR,  or 2.5-3.5 for mechanical heart valves.   BLOOD CULTURE   BLOOD CULTURE   RAINBOW DRAW    Narrative:     The following orders were created for panel order [facility] Draw.  Procedure                               Abnormality         Status                     ---------                               -----------         ------                     Light Blue Top[[id number]]                                    Final result               Green Top (Gel)[[id number]]                                   Final result               Lavender Top[[id number]]                                      Final result               Gold Top -  SST[[id number]]                                    Final result                 Please view results for these tests on the individual orders.   TROPONIN (IN-HOUSE)   BLOOD GAS, ARTERIAL   CBC AND DIFFERENTIAL    Narrative:     The following orders were created for panel order CBC & Differential.  Procedure                               Abnormality         Status                     ---------                               -----------         ------                     CBC Auto Differential[[id number]]         Abnormal            Final result                 Please view results for these tests on the individual orders.   LIGHT BLUE TOP   GREEN TOP   LAVENDER TOP   GOLD TOP - SST       XR Chest 1 View   Final Result   Small left pleural effusion with bibasilar   atelectasis and/or pneumonia.      Electronically signed by:  Zackery Marcano  10/13/2018 11:27 PM   CDT Workstation: [id number]          result discussed with patient and family. Spoke to Dr Mast who accepted patient. Patient is presently on Bipap    MDM      Final diagnoses:   Acute on chronic respiratory failure with hypoxia (CMS/[facility])   Pneumonia due to infectious organism, unspecified laterality, unspecified part of lung   NSTEMI (non-ST elevated myocardial infarction) (CMS/[facility])            Ramsey Rubio MD  10/14/18 0102       Ramsey Rubio MD  10/14/18 0103

## 2018-10-14 NOTE — PLAN OF CARE
Problem: Fall Risk (Adult)  Goal: Absence of Fall  Outcome: Ongoing (interventions implemented as appropriate)      Problem: Patient Care Overview  Goal: Plan of Care Review  Outcome: Ongoing (interventions implemented as appropriate)   10/14/18 1332   Coping/Psychosocial   Plan of Care Reviewed With patient   Plan of Care Review   Progress no change   OTHER   Outcome Summary Pt choked while eating breakfast this AM, SLP eval ordered, v/s stable, will continue to monitor      Goal: Individualization and Mutuality  Outcome: Ongoing (interventions implemented as appropriate)    Goal: Discharge Needs Assessment  Outcome: Ongoing (interventions implemented as appropriate)    Goal: Interprofessional Rounds/Family Conf  Outcome: Ongoing (interventions implemented as appropriate)      Problem: Skin Injury Risk (Adult)  Goal: Skin Health and Integrity  Outcome: Ongoing (interventions implemented as appropriate)      Problem: Pneumonia (Adult)  Goal: Signs and Symptoms of Listed Potential Problems Will be Absent, Minimized or Managed (Pneumonia)  Outcome: Ongoing (interventions implemented as appropriate)      Problem: Breathing Pattern Ineffective (Adult)  Goal: Identify Related Risk Factors and Signs and Symptoms  Outcome: Outcome(s) achieved Date Met: 10/14/18    Goal: Effective Oxygenation/Ventilation  Outcome: Ongoing (interventions implemented as appropriate)    Goal: Anxiety/Fear Reduction  Outcome: Ongoing (interventions implemented as appropriate)

## 2018-10-14 NOTE — PLAN OF CARE
Problem: Fall Risk (Adult)  Goal: Identify Related Risk Factors and Signs and Symptoms  Outcome: Outcome(s) achieved Date Met: 10/14/18    Goal: Absence of Fall  Outcome: Ongoing (interventions implemented as appropriate)      Problem: Patient Care Overview  Goal: Plan of Care Review  Outcome: Ongoing (interventions implemented as appropriate)   10/14/18 0402   Coping/Psychosocial   Plan of Care Reviewed With patient   Plan of Care Review   Progress no change   OTHER   Outcome Summary pt just arrived on the unit from ER; currently on cpap; IV lasix given; will continue to monitor pt      Goal: Individualization and Mutuality  Outcome: Ongoing (interventions implemented as appropriate)    Goal: Discharge Needs Assessment  Outcome: Ongoing (interventions implemented as appropriate)    Goal: Interprofessional Rounds/Family Conf  Outcome: Ongoing (interventions implemented as appropriate)      Problem: Skin Injury Risk (Adult)  Goal: Identify Related Risk Factors and Signs and Symptoms  Outcome: Outcome(s) achieved Date Met: 10/14/18    Goal: Skin Health and Integrity  Outcome: Ongoing (interventions implemented as appropriate)      Problem: Pneumonia (Adult)  Goal: Signs and Symptoms of Listed Potential Problems Will be Absent, Minimized or Managed (Pneumonia)  Outcome: Ongoing (interventions implemented as appropriate)

## 2018-10-14 NOTE — PROGRESS NOTES
Progress Note  Fransisco North MD  Hospitalist    Date of visit: 10/14/2018     LOS: 0 days   Patient Care Team:  Breezy Mcfarland MD as PCP - General    Chief Complaint: weakness    Subjective     Interval History:     Patient Complaints: admitted for weakness, cough, shortness of breath and worsening confusion. She seems to choke when eating.    History taken from: nursing / chart    Medication Review:   Current Facility-Administered Medications   Medication Dose Route Frequency Provider Last Rate Last Dose   • furosemide (LASIX) injection 20 mg  20 mg Intravenous Daily Fransisco North MD   20 mg at 10/14/18 1152   • ipratropium-albuterol (DUO-NEB) nebulizer solution 3 mL  3 mL Nebulization 4x Daily - RT Ramsey Rubio MD   3 mL at 10/14/18 1558   • [START ON 10/16/2018] levoFLOXacin (LEVAQUIN) 250 mg/50 mL D5W (premix) 250 mg  250 mg Intravenous Q48H Fransisco North MD       • sodium chloride 0.9 % flush 3 mL  3 mL Intravenous Q12H Terence Mast MD   3 mL at 10/14/18 0918   • sodium chloride 0.9 % flush 3-10 mL  3-10 mL Intravenous PRN Terence Mast MD       • sodium chloride 0.9 % infusion  - ADS Override Pull                Review of Systems:   Review of Systems   Constitutional: Positive for fatigue. Negative for fever.   Respiratory: Positive for cough and shortness of breath. Negative for wheezing.    Cardiovascular: Negative for chest pain, palpitations and leg swelling.   Gastrointestinal: Negative for abdominal distention, abdominal pain, constipation, nausea and vomiting.   Genitourinary: Negative for difficulty urinating, dysuria, frequency, hematuria and urgency.   Musculoskeletal: Positive for arthralgias and back pain.   Skin: Positive for pallor. Negative for color change.   Neurological: Positive for weakness. Negative for syncope, facial asymmetry and numbness.   Psychiatric/Behavioral: Positive for confusion. Negative for agitation and behavioral problems.       Objective     Vital  Signs  Temp:  [94 °F (34.4 °C)-99.3 °F (37.4 °C)] 99.3 °F (37.4 °C)  Heart Rate:  [73-76] 73  Resp:  [16-36] 16  BP: (130-240)/() 158/68  FiO2 (%):  [35 %-40 %] 35 %    Physical Exam:  Physical Exam   Constitutional: She appears cachectic. She appears ill. No distress.   HENT:   Head: Normocephalic and atraumatic.   Eyes: Pupils are equal, round, and reactive to light. No scleral icterus.   Neck: Normal range of motion. Neck supple.   Cardiovascular: Normal rate and regular rhythm.    Pulmonary/Chest: Effort normal. She has rales.   Abdominal: Soft. Bowel sounds are normal. She exhibits no distension. There is no tenderness.   Musculoskeletal: Normal range of motion. She exhibits no edema, tenderness or deformity.   Neurological: She is alert. No cranial nerve deficit. Coordination normal.   Pleasantly confused   Skin: Skin is warm and dry. There is pallor.   Psychiatric: She has a normal mood and affect. Her behavior is normal.   Vitals reviewed.       Results Review:    Lab Results (last 24 hours)     Procedure Component Value Units Date/Time    Blood Culture - Blood, Blood, Venous Line [[id number]]  (Normal) Collected:  10/14/18 0256    Specimen:  Blood from Blood, Venous Line Updated:  10/14/18 1500     Blood Culture No growth at less than 24 hours    Blood Culture - Blood, Blood, Venous Line [[id number]]  (Normal) Collected:  10/14/18 0112    Specimen:  Blood from Arm, Left Updated:  10/14/18 1331     Blood Culture No growth at less than 24 hours    Legionella Antigen, Urine - Urine, Urine, Clean Catch [[id number]]  (Normal) Collected:  10/14/18 0613    Specimen:  Urine from Urine, Clean Catch Updated:  10/14/18 0717     LEGIONELLA ANTIGEN, URINE Negative    S. Pneumo Ag Urine or CSF - Urine, Urine, Clean Catch [[id number]]  (Normal) Collected:  10/14/18 0613    Specimen:  Urine from Urine, Clean Catch Updated:  10/14/18 0715     Strep Pneumo Ag Negative    Comprehensive Metabolic Panel [[id number]]   (Abnormal) Collected:  10/14/18 0630    Specimen:  Blood Updated:  10/14/18 0705     Glucose 138 (H) mg/dL      BUN 28 (H) mg/dL      Creatinine 0.96 mg/dL      Sodium 136 (L) mmol/L      Potassium 4.1 mmol/L      Chloride 97 mmol/L      CO2 29.0 mmol/L      Calcium 9.3 mg/dL      Total Protein 7.1 g/dL      Albumin 3.70 g/dL      ALT (SGPT) 60 (H) U/L      AST (SGOT) 51 (H) U/L      Alkaline Phosphatase 154 (H) U/L      Total Bilirubin 0.7 mg/dL      eGFR Non African Amer 54 mL/min/1.73      Globulin 3.4 gm/dL      A/G Ratio 1.1 g/dL      BUN/Creatinine Ratio 29.2 (H)     Anion Gap 10.0 mmol/L     Narrative:       The MDRD GFR formula is only valid for adults with stable renal function between ages 18 and 70.    CBC Auto Differential [[id number]]  (Abnormal) Collected:  10/14/18 0630    Specimen:  Blood Updated:  10/14/18 0647     WBC 9.05 10*3/mm3      RBC 4.10 10*6/mm3      Hemoglobin 12.0 g/dL      Hematocrit 37.1 %      MCV 90.5 fL      MCH 29.3 pg      MCHC 32.3 g/dL      RDW 14.0 %      RDW-SD 46.3 fl      MPV 8.9 fL      Platelets 286 10*3/mm3      Neutrophil % 84.7 (H) %      Lymphocyte % 7.7 (L) %      Monocyte % 7.3 %      Eosinophil % 0.0 %      Basophil % 0.1 %      Immature Grans % 0.2 %      Neutrophils, Absolute 7.66 10*3/mm3      Lymphocytes, Absolute 0.70 10*3/mm3      Monocytes, Absolute 0.66 10*3/mm3      Eosinophils, Absolute 0.00 10*3/mm3      Basophils, Absolute 0.01 10*3/mm3      Immature Grans, Absolute 0.02 10*3/mm3     Respiratory Panel, PCR - Swab, Nasopharynx [[id number]]  (Normal) Collected:  10/14/18 0314    Specimen:  Swab from Nasopharynx Updated:  10/14/18 0437     ADENOVIRUS, PCR Not Detected     Coronavirus 229E Not Detected     Coronavirus HKU1 Not Detected     Coronavirus NL63 Not Detected     Coronavirus OC43 Not Detected     Human Metapneumovirus Not Detected     Human Rhinovirus/Enterovirus Not Detected     Influenza B PCR Not Detected     Parainfluenza Virus 1 Not Detected      Parainfluenza Virus 2 Not Detected     Parainfluenza Virus 3 Not Detected     Parainfluenza Virus 4 Not Detected     Bordetella pertussis pcr Not Detected     Influenza A H1 2009 PCR Not Detected     Chlamydophila pneumoniae PCR Not Detected     Mycoplasma pneumo by PCR Not Detected     Influenza A PCR Not Detected     Influenza A H3 Not Detected     Influenza A H1 Not Detected     RSV, PCR Not Detected    [place] Draw [[id number]] Collected:  10/13/18 2309    Specimen:  Blood Updated:  10/14/18 0016    Narrative:       The following orders were created for panel order [place] Draw.  Procedure                               Abnormality         Status                     ---------                               -----------         ------                     Light Blue Top[[id number]]                                    Final result               Green Top (Gel)[[id number]]                                   Final result               Lavender Top[[id number]]                                      Final result               Gold Top - SST[[id number]]                                    Final result                 Please view results for these tests on the individual orders.    Light Blue Top [[id number]] Collected:  10/13/18 2309    Specimen:  Blood Updated:  10/14/18 0016     Extra Tube hold for add-on     Comment: Auto resulted       Green Top (Gel) [[id number]] Collected:  10/13/18 2309    Specimen:  Blood Updated:  10/14/18 0016     Extra Tube Hold for add-ons.     Comment: Auto resulted.       Lavender Top [[id number]] Collected:  10/13/18 2309    Specimen:  Blood Updated:  10/14/18 0016     Extra Tube hold for add-on     Comment: Auto resulted       Gold Top - SST [[id number]] Collected:  10/13/18 2309    Specimen:  Blood Updated:  10/14/18 0016     Extra Tube Hold for add-ons.     Comment: Auto resulted.       Blood Gas, Arterial [[id number]]  (Abnormal) Collected:  10/13/18 2348    Specimen:  Arterial Blood Updated:  10/13/18 2355      Site Right Radial     Damion's Test N/A     pH, Arterial 7.316 (L) pH units      pCO2, Arterial 52.2 (H) mm Hg      pO2, Arterial 115.0 (H) mm Hg      HCO3, Arterial 26.6 (H) mmol/L      Base Excess, Arterial -0.3 (L) mmol/L      O2 Saturation, Arterial 98.0 %      Barometric Pressure for Blood Gas 748 mmHg      Modality Nasal Cannula     Flow Rate 5.0 lpm      Ventilator Mode NA     Collected by lucinda patino    Protime-INR [[id number]]  (Normal) Collected:  10/13/18 2309    Specimen:  Blood Updated:  10/13/18 2339     Protime 13.4 Seconds      INR 1.04    Narrative:       Therapeutic range for most indications is 2.0-3.0 INR,  or 2.5-3.5 for mechanical heart valves.    BNP [[id number]]  (Abnormal) Collected:  10/13/18 2309    Specimen:  Blood Updated:  10/13/18 2337     proBNP 20,100.0 (H) pg/mL     Troponin [[id number]]  (Abnormal) Collected:  10/13/18 2309    Specimen:  Blood Updated:  10/13/18 2337     Troponin I 0.047 (H) ng/mL     Comprehensive Metabolic Panel [[id number]]  (Abnormal) Collected:  10/13/18 2309    Specimen:  Blood Updated:  10/13/18 2326     Glucose 213 (H) mg/dL      BUN 31 (H) mg/dL      Creatinine 1.08 (H) mg/dL      Sodium 132 (L) mmol/L      Potassium 5.0 mmol/L      Chloride 94 (L) mmol/L      CO2 26.0 mmol/L      Calcium 9.4 mg/dL      Total Protein 7.5 g/dL      Albumin 4.00 g/dL      ALT (SGPT) 68 (H) U/L      AST (SGOT) 56 (H) U/L      Alkaline Phosphatase 166 (H) U/L      Total Bilirubin 0.8 mg/dL      eGFR Non African Amer 47 mL/min/1.73      Globulin 3.5 gm/dL      A/G Ratio 1.1 g/dL      BUN/Creatinine Ratio 28.7 (H)     Anion Gap 12.0 mmol/L     Narrative:       The MDRD GFR formula is only valid for adults with stable renal function between ages 18 and 70.    CBC & Differential [[id number]] Collected:  10/13/18 2309    Specimen:  Blood Updated:  10/13/18 2319    Narrative:       The following orders were created for panel order CBC & Differential.  Procedure                                Abnormality         Status                     ---------                               -----------         ------                     CBC Auto Differential[[id number]]         Abnormal            Final result                 Please view results for these tests on the individual orders.    CBC Auto Differential [[id number]]  (Abnormal) Collected:  10/13/18 2309    Specimen:  Blood Updated:  10/13/18 2319     WBC 11.37 (H) 10*3/mm3      RBC 4.22 10*6/mm3      Hemoglobin 12.6 g/dL      Hematocrit 38.3 %      MCV 90.8 fL      MCH 29.9 pg      MCHC 32.9 g/dL      RDW 14.0 %      RDW-SD 45.9 fl      MPV 8.9 fL      Platelets 333 10*3/mm3      Neutrophil % 78.9 %      Lymphocyte % 12.1 %      Monocyte % 7.9 %      Eosinophil % 0.6 %      Basophil % 0.4 %      Immature Grans % 0.1 %      Neutrophils, Absolute 8.96 (H) 10*3/mm3      Lymphocytes, Absolute 1.38 10*3/mm3      Monocytes, Absolute 0.90 10*3/mm3      Eosinophils, Absolute 0.07 10*3/mm3      Basophils, Absolute 0.05 10*3/mm3      Immature Grans, Absolute 0.01 10*3/mm3      nRBC 0.0 /100 WBC           Imaging Results (last 24 hours)     Procedure Component Value Units Date/Time    XR Chest 1 View [[id number]] Collected:  10/13/18 2307     Updated:  10/13/18 2328    Narrative:         Chest single view on  10/13/2018     CLINICAL INDICATION: Chest pain    COMPARISON: None    FINDINGS: 2-lead left subclavian pacemaker is noted in place.  Borderline cardiomegaly is noted. There is mild scoliosis of the  spine. There is small left pleural effusion. There are bibasilar  opacities consistent with atelectasis and/or pneumonia. Mild  chronic interstitial changes are noted. Vascular calcification is  noted in the aorta.      Impression:       Small left pleural effusion with bibasilar  atelectasis and/or pneumonia.    Electronically signed by:  Zackery Marcano  10/13/2018 11:27 PM  CDT Workstation: [id number]          Assessment/Plan       Acute respiratory failure with  hypoxia and hypercapnia (CMS/HCC)    Bilateral pleural effusion    Hypertension    Osteoporosis    Diabetes mellitus (CMS/HCC)    Follow up on the cardiac Echo, continue with the IV Lasix, nebulized treatments, antibiotics, Oxygen. Improve blood pressure control, obtain speech evaluation.    Fransisco North MD  10/14/18  5:58 PM

## 2018-10-15 PROBLEM — I35.0 AORTIC VALVE STENOSIS: Chronic | Status: ACTIVE | Noted: 2018-01-01

## 2018-10-15 PROBLEM — I27.20 PULMONARY HYPERTENSION (HCC): Chronic | Status: ACTIVE | Noted: 2018-01-01

## 2018-10-15 NOTE — PROGRESS NOTES
Progress Note  Fransisco North MD  Hospitalist    Date of visit: 10/15/2018     LOS: 1 day   Patient Care Team:  Breezy Mcfarland MD as PCP - General    Chief Complaint: weakness    Subjective     Interval History:     Patient Complaints: admitted for weakness, cough, shortness of breath and worsening confusion. She looks / acts better today.    History taken from: nursing / chart    Medication Review:   Current Facility-Administered Medications   Medication Dose Route Frequency Provider Last Rate Last Dose   • aspirin chewable tablet 81 mg  81 mg Oral Daily Terence Mast MD   81 mg at 10/15/18 0819   • atorvastatin (LIPITOR) tablet 20 mg  20 mg Oral Once per day on Mon Wed Fri Terence Mast MD   20 mg at 10/15/18 0819   • digoxin (LANOXIN) tablet 125 mcg  125 mcg Oral Daily Terence Mast MD   125 mcg at 10/15/18 1208   • furosemide (LASIX) injection 20 mg  20 mg Intravenous Daily Fransisco North MD   20 mg at 10/15/18 0819   • ipratropium-albuterol (DUO-NEB) nebulizer solution 3 mL  3 mL Nebulization 4x Daily - RT Ramsey Rubio MD   3 mL at 10/15/18 1119   • [START ON 10/16/2018] levoFLOXacin (LEVAQUIN) tablet 250 mg  250 mg Oral Every Other Day Terence Mast MD       • levothyroxine (SYNTHROID, LEVOTHROID) tablet 37.5 mcg  37.5 mcg Oral Q AM Terence Mast MD   37.5 mcg at 10/15/18 0531   • lisinopril (PRINIVIL,ZESTRIL) tablet 5 mg  5 mg Oral Daily Terence Mast MD   5 mg at 10/15/18 0819   • LORazepam (ATIVAN) injection 0.5 mg  0.5 mg Intravenous Q8H PRN Fransisco North MD   0.5 mg at 10/14/18 1836   • meclizine (ANTIVERT) tablet 12.5 mg  12.5 mg Oral BID Terence Mast MD   12.5 mg at 10/15/18 0819   • montelukast (SINGULAIR) tablet 10 mg  10 mg Oral Nightly Terence Mast MD   10 mg at 10/14/18 2237   • pantoprazole (PROTONIX) EC tablet 40 mg  40 mg Oral Terence Ledesma MD   40 mg at 10/15/18 0531   • sertraline (ZOLOFT) tablet 50 mg  50 mg Oral Daily Terence Mast MD   50 mg at 10/15/18 5070    • sodium chloride 0.9 % flush 3 mL  3 mL Intravenous Q12H Terence Mast MD   3 mL at 10/15/18 0819   • sodium chloride 0.9 % flush 3-10 mL  3-10 mL Intravenous PRN Terence Mast MD       • sodium chloride 0.9 % infusion  - ADS Override Pull                Review of Systems:   Review of Systems   Constitutional: Positive for fatigue. Negative for fever.   Respiratory: Positive for cough and shortness of breath. Negative for wheezing.    Cardiovascular: Negative for chest pain, palpitations and leg swelling.   Gastrointestinal: Negative for abdominal distention, abdominal pain, constipation, nausea and vomiting.   Genitourinary: Negative for difficulty urinating, dysuria, frequency, hematuria and urgency.   Musculoskeletal: Positive for arthralgias and back pain.   Skin: Positive for pallor. Negative for color change.   Neurological: Positive for weakness. Negative for syncope, facial asymmetry and numbness.   Psychiatric/Behavioral: Positive for confusion. Negative for agitation and behavioral problems.       Objective     Vital Signs  Temp:  [96.6 °F (35.9 °C)-99.3 °F (37.4 °C)] 96.6 °F (35.9 °C)  Heart Rate:  [73-91] 76  Resp:  [16-20] 18  BP: (121-158)/(52-70) 122/53    Physical Exam:  Physical Exam   Constitutional: She appears cachectic. She appears ill. No distress.   HENT:   Head: Normocephalic and atraumatic.   Eyes: Pupils are equal, round, and reactive to light. No scleral icterus.   Neck: Normal range of motion. Neck supple.   Cardiovascular: Normal rate and regular rhythm.    Pulmonary/Chest: Effort normal. She has rales.   Abdominal: Soft. Bowel sounds are normal. She exhibits no distension. There is no tenderness.   Musculoskeletal: Normal range of motion. She exhibits no edema, tenderness or deformity.   Neurological: She is alert. No cranial nerve deficit. Coordination normal.   Less confused   Skin: Skin is warm and dry. There is pallor.   Psychiatric: She has a normal mood and affect. Her  behavior is normal.   Vitals reviewed.       Results Review:    Lab Results (last 24 hours)     Procedure Component Value Units Date/Time    Blood Culture - Blood, Blood, Venous Line [[id number]]  (Normal) Collected:  10/14/18 0256    Specimen:  Blood from Blood, Venous Line Updated:  10/15/18 0301     Blood Culture No growth at 24 hours    Blood Culture - Blood, Blood, Venous Line [[id number]]  (Normal) Collected:  10/14/18 0112    Specimen:  Blood from Arm, Left Updated:  10/15/18 0120     Blood Culture No growth at 24 hours          Imaging Results (last 24 hours)     ** No results found for the last 24 hours. **          Assessment/Plan       Acute respiratory failure with hypoxia and hypercapnia (CMS/HCC)    Bilateral pleural effusion    Diabetes mellitus (CMS/HCC)    Hypertension    Pulmonary hypertension (CMS/HCC)    Aortic valve stenosis    Osteoporosis    Continue with the IV Lasix, nebulized treatments, antibiotics, Oxygen. Try PT/OT as tolerated.    Her blood pressure is better controlled, she is more alert. The transthoracic Echo shows LVH, moderate aortic stenosis and severe pulmonary hypertension.    Family intends to return her home with sitters.    Fransisco North MD  10/15/18  1:56 PM

## 2018-10-15 NOTE — PLAN OF CARE
Problem: Patient Care Overview  Goal: Plan of Care Review  Outcome: Ongoing (interventions implemented as appropriate)   10/15/18 8968   Coping/Psychosocial   Plan of Care Reviewed With patient   Plan of Care Review   Progress improving   OTHER   Outcome Summary Pt seen for dysphagia therapy this date. Pt repositioned herself to 90 degrees to eat. SLP reviewed safe swallowing strategies that were given upon eval. Pt stated that the strategies have been helping. Pt safely tolerated regular solids/thin liqids via straw w/no overt s/s of aspiration. SLP to d/c pt on current diet w/safe swallowing strategies.

## 2018-10-15 NOTE — THERAPY DISCHARGE NOTE
Acute Care - Speech Language Pathology   Swallow Treatment Note/Discharge   [facility]     Patient Name: Mariaa Pires  : 4/10/1926  MRN: [id number]  Today's Date: 10/15/2018               Admit Date: 10/13/2018     Goal:  Patient will safely tolerate least restricted diet w/no overt s/s of aspiration for adequate nutrition and hydration:  Pt seen for dysphagia therapy this date. Pt repositioned herself to 90 degrees to eat. SLP reviewed safe swallowing strategies that were given upon eval. Pt stated that the strategies have been helping. Pt safely tolerated regular solids/thin liqids via straw w/no overt s/s of aspiration. SLP to d/c pt on current diet w/safe swallowing strategies.    Visit Dx:      ICD-10-CM ICD-9-CM   1. Acute on chronic respiratory failure with hypoxia (CMS/HCC) J96.21 518.84     799.02   2. Pneumonia due to infectious organism, unspecified laterality, unspecified part of lung J18.9 136.9     484.8   3. NSTEMI (non-ST elevated myocardial infarction) (CMS/HCC) I21.4 410.70   4. Oropharyngeal dysphagia R13.12 787.22     Patient Active Problem List   Diagnosis   • Pneumonia   • Bilateral pleural effusion   • Osteoporosis   • Acute respiratory failure with hypoxia and hypercapnia (CMS/HCC)   • Diabetes mellitus (CMS/HCC)   • Hypertension       Therapy Treatment        Rehabilitation Treatment Summary     Row Name 10/15/18 0728             Treatment Time/Intention    Discipline speech language pathologist  -CK      Document Type discharge treatment  -CK2      Subjective Information no complaints  -CK      Mode of Treatment individual therapy;speech-language pathology  -CK      Patient/Family Observations Pt alert sitting up in bed; no family present  -CK      Care Plan Review care plan/treatment goals reviewed;risks/benefits reviewed;current/potential barriers reviewed;patient/other agree to care plan  -CK2      Total Evaluation Minutes, SLP 32  -CK2      Patient Effort good  -CK3       Recorded by [CK] Marissa Aparicio MS CCC-SLP 10/15/18 0736  [CK2] TracyMarissa mathur, MS CCC-SLP 10/15/18 1332  [CK3] Marissa Aparicio, MS CCC-SLP 10/15/18 0754      Row Name 10/15/18 0728             Positioning and Restraints    Pre-Treatment Position in bed  -CK      Post Treatment Position bed  -CK2      In Bed sitting;call light within reach;encouraged to call for assist  -CK2      Recorded by [CK] Marissa Aparicio MS CCC-SLP 10/15/18 0736  [CK2] Marissa Aparicio, MS CCC-SLP 10/15/18 1332      Row Name 10/15/18 0728             Pain Scale: Numbers Pre/Post-Treatment    Pain Scale: Numbers, Pretreatment 0/10 - no pain  -CK      Pain Scale: Numbers, Post-Treatment 0/10 - no pain  -CK2      Recorded by [CK] Marissa Aparicio MS CCC-SLP 10/15/18 0736  [CK2] Marissa Aparicio, MS CCC-SLP 10/15/18 1332      Row Name 10/15/18 0728             Outcome Summary/Treatment Plan (SLP)    Daily Summary of Progress (SLP) prepare for discharge  -CK      Plan for Continued Treatment (SLP) d/c from skilled  services  -CK      Anticipated Dischage Disposition unknown  -CK      Reason for Discharge all goals and outcomes met, no further needs identified  -CK      Recorded by [CK] Marissa Aparciio MS CCC-SLP 10/15/18 1332        User Key  (r) = Recorded By, (t) = Taken By, (c) = Cosigned By    Initials Name Effective Dates Discipline    CK Marissa Aparicio, MS CCC-SLP 04/03/18 -  SLP        Outcome Summary  Outcome Summary/Treatment Plan (SLP)  Daily Summary of Progress (SLP): prepare for discharge (10/15/18 0728 : Marissa Aparicio, MS CCC-SLP)  Plan for Continued Treatment (SLP): d/c from skilled  services (10/15/18 0728 : Marissa Aparicio, MS CCC-SLP)  Anticipated Dischage Disposition: unknown (10/15/18 1325 : Marissa Aparicio, MS CCC-SLP)  Reason for Discharge: all goals and outcomes met, no further needs identified (10/15/18 1325 : Marissa Aparicio MS [facility]-SLP)        SLP GOALS     Row Name  10/15/18 0728 10/14/18 1030          Oral Nutrition/Hydration Goal 1 (SLP)    Oral Nutrition/Hydration Goal 1, SLP pt to tolerate highest level diet for safe and adequate nutrition/hydration  -CK pt to tolerate highest level diet for safe and adequate nutrition/hydration  -EC     Time Frame (Oral Nutrition/Hydration Goal 1, SLP) by discharge  -CK by discharge  -EC     Barriers (Oral Nutrition/Hydration Goal 1, SLP) none  -CK none  -EC     Progress/Outcomes (Oral Nutrition/Hydration Goal 1, SLP) goal met  -CK other (see comments)   new goal  -EC       User Key  (r) = Recorded By, (t) = Taken By, (c) = Cosigned By    Initials Name Provider Type    EC Amarilis Graham CCC-SLP Speech and Language Pathologist    Marissa Tam, MS CCC-SLP Speech and Language Pathologist          EDUCATION  The patient has been educated in the following areas:   Dysphagia (Swallowing Impairment).    SLP Recommendation and Plan                    Anticipated Dischage Disposition: unknown                Daily Summary of Progress (SLP): prepare for discharge  Plan for Continued Treatment (SLP): d/c from skilled ST services  Plan of Care Reviewed With: patient  Progress: improving  Plan of Care Reviewed With: patient           Time Calculation:         Time Calculation- SLP     Row Name 10/15/18 1324             Time Calculation- SLP    SLP Start Time 0728  -CK      SLP Stop Time 0800  -CK      SLP Time Calculation (min) 32 min  -CK      Total Timed Code Minutes- SLP 32 minute(s)  -CK      SLP Received On 10/15/18  -CK        User Key  (r) = Recorded By, (t) = Taken By, (c) = Cosigned By    Initials Name Provider Type    CK Marissa Aparicio, MS CCC-SLP Speech and Language Pathologist          Therapy Charges for Today     Code Description Service Date Service Provider Modifiers Qty    77395908232 HC ST SWALLOWING DISCHARGE 10/15/2018 Marissa Aparicio, MS CCC-SLP GN, CI 1    45526021596 HC ST TREATMENT SWALLOW 2 10/15/2018  Marissa Aparicio MS CCC-SLP GN 1          SLP G-Codes  Functional Limitations: Swallowing  Swallow Current Status (): At least 20 percent but less than 40 percent impaired, limited or restricted  Swallow Goal Status (): At least 1 percent but less than 20 percent impaired, limited or restricted  Swallow Discharge Status (): At least 1 percent but less than 20 percent impaired, limited or restricted    SLP Discharge Summary  Anticipated Dischage Disposition: unknown  Reason for Discharge: all goals and outcomes met, no further needs identified  Progress Toward Achieving Short/long Term Goals: all goals met within established timelines    Marissa Aparciio, MS CCC-SLP  10/15/2018

## 2018-10-15 NOTE — PLAN OF CARE
Problem: Fall Risk (Adult)  Goal: Absence of Fall  Outcome: Ongoing (interventions implemented as appropriate)      Problem: Patient Care Overview  Goal: Plan of Care Review  Outcome: Ongoing (interventions implemented as appropriate)   10/15/18 7708   Coping/Psychosocial   Plan of Care Reviewed With patient   Plan of Care Review   Progress improving   OTHER   Outcome Summary Pt has had no complaints this shift, O2 weaned to 1.5L, v/s stable, will continue to monitor      Goal: Individualization and Mutuality  Outcome: Ongoing (interventions implemented as appropriate)    Goal: Discharge Needs Assessment  Outcome: Ongoing (interventions implemented as appropriate)    Goal: Interprofessional Rounds/Family Conf  Outcome: Ongoing (interventions implemented as appropriate)      Problem: Skin Injury Risk (Adult)  Goal: Skin Health and Integrity  Outcome: Ongoing (interventions implemented as appropriate)      Problem: Pneumonia (Adult)  Goal: Signs and Symptoms of Listed Potential Problems Will be Absent, Minimized or Managed (Pneumonia)  Outcome: Ongoing (interventions implemented as appropriate)      Problem: Breathing Pattern Ineffective (Adult)  Goal: Effective Oxygenation/Ventilation  Outcome: Ongoing (interventions implemented as appropriate)    Goal: Anxiety/Fear Reduction  Outcome: Ongoing (interventions implemented as appropriate)

## 2018-10-15 NOTE — PLAN OF CARE
Problem: Fall Risk (Adult)  Goal: Absence of Fall  Outcome: Ongoing (interventions implemented as appropriate)      Problem: Patient Care Overview  Goal: Plan of Care Review  Outcome: Ongoing (interventions implemented as appropriate)   10/15/18 0356   Coping/Psychosocial   Plan of Care Reviewed With patient   Plan of Care Review   Progress no change   OTHER   Outcome Summary Pt currently resting; home meds restarted; VS stable; will continue to monitor pt      Goal: Individualization and Mutuality  Outcome: Ongoing (interventions implemented as appropriate)    Goal: Discharge Needs Assessment  Outcome: Ongoing (interventions implemented as appropriate)    Goal: Interprofessional Rounds/Family Conf  Outcome: Ongoing (interventions implemented as appropriate)      Problem: Skin Injury Risk (Adult)  Goal: Skin Health and Integrity  Outcome: Ongoing (interventions implemented as appropriate)      Problem: Pneumonia (Adult)  Goal: Signs and Symptoms of Listed Potential Problems Will be Absent, Minimized or Managed (Pneumonia)  Outcome: Ongoing (interventions implemented as appropriate)      Problem: Breathing Pattern Ineffective (Adult)  Goal: Effective Oxygenation/Ventilation  Outcome: Ongoing (interventions implemented as appropriate)    Goal: Anxiety/Fear Reduction  Outcome: Ongoing (interventions implemented as appropriate)

## 2018-10-15 NOTE — PROGRESS NOTES
Discharge Planning Assessment  [facility]     Patient Name: Mariaa Pires  MRN: [id number]  Today's Date: 10/15/2018    Admit Date: 10/13/2018          Discharge Needs Assessment     Row Name 10/15/18 1536       Living Environment    Lives With alone    [place]uation [place]  family lives around her and they drop in and out during the day    Current Living Arrangements home/apartment/condo    Primary Care Provided by self    Provides Primary Care For no one, unable/limited ability to care for self    Caregiving Concerns pt is 89 yrs old and lives alone at present    Family Caregiver if Needed child(larisa), adult    Family Caregiver Names marely    Quality of Family Relationships supportive    Able to Return to Prior Arrangements yes    Living Arrangement Comments family lives around [place] a farm.  they drop in and out during the day and they have arranged sitters to stay with her at night.       Resource/Environmental Concerns    Resource/Environmental Concerns none    Transportation Concerns car, none       Transition Planning    Patient/Family Anticipates Transition to home with help/services;home with family    Patient/Family Anticipated Services at Transition home health care    Transportation Anticipated family or friend will provide       Discharge Needs Assessment    Readmission Within the Last 30 Days no previous admission in last 30 days    Concerns to be Addressed discharge planning;home safety    Outpatient/Agency/Support Group Needs homecare agency    Discharge Facility/Level of Care Needs home with home health    Offered/Gave Vendor List no    Patient's Choice of Community Agency(s) pt active with caretenders    Current Discharge Risk lives alone    Discharge Coordination/Progress family plans for pt to have sitters during the evenings and night.  the family all llive around her and they will be dropping in and out during the day.   pt is active with caretenders and would like  them upon dc              Discharge Plan    No documentation.       Destination     No service coordination in this encounter.      Durable Medical Equipment     No service coordination in this encounter.      Dialysis/Infusion     No service coordination in this encounter.      Home Medical Care     No service coordination in this encounter.      Social Care     No service coordination in this encounter.        Expected Discharge Date and Time     Expected Discharge Date Expected Discharge Time    Oct 16, 2018               Demographic Summary     Row Name 10/15/18 1535       General Information    Admission Type inpatient    Arrived From home    Required Notices Provided Important Message from Medicare    Referral Source high risk screening    Reason for Consult discharge planning    Preferred Language English     Used During This Interaction no    General Information Comments discussed with her son Breezy who is also her health care surragate.             Functional Status    No documentation.           Psychosocial    No documentation.           Abuse/Neglect    No documentation.           Legal    No documentation.           Substance Abuse    No documentation.           Patient Forms    No documentation.         Katya Oconnell

## 2018-10-15 NOTE — PLAN OF CARE
Problem: Patient Care Overview  Goal: Plan of Care Review  Outcome: Ongoing (interventions implemented as appropriate)   10/15/18 5987   Coping/Psychosocial   Plan of Care Reviewed With family   Plan of Care Review   Progress no change   OTHER   Outcome Summary Pt sleeping. Family reports fair appetite and stable wt. Will send Boost GC on trays to optimize nutrition for wt gain.

## 2018-10-15 NOTE — CONSULTS
Adult Nutrition  Assessment    Patient Name:  Mariaa Pires  YOB: 1926  MRN: [id number]  Admit Date:  10/13/2018    Assessment Date:  10/15/2018    Comments:  Pt is a 89 year old female with hx of CHF and DM admitted with fluid overload.  Pt sleeping soundly at time of RD visit but family reports pt eats fairly well and wt has been stable over past year.  Wt down since admit with lasix administered, UBW reported between 97-102lb.  Pt likes Boost, will send on trays to optimize nutrition.  RD made family aware of menu options available.  BMI is 17.5 which is categorized as underweight with generalized muscle wasting and fat loss noted.  RD will monitor.          Reason for Assessment     Row Name 10/15/18 1549          Reason for Assessment    Reason For Assessment identified at risk by screening criteria     Diagnosis cardiac disease     Identified At Risk by Screening Criteria BMI;reduced oral intake over the last month               Nutrition/Diet History     Row Name 10/15/18 154          Nutrition/Diet History    Typical Food/Fluid Intake Pt sleeping soundly.  Family reports pt has fairly good appetite, likes Boost.               Labs/Tests/Procedures/Meds     Row Name 10/15/18 1547          Labs/Procedures/Meds    Lab Results Reviewed reviewed, pertinent        Medications    Pertinent Medications Reviewed reviewed, pertinent     Pertinent Medications Comments lasix             Physical Findings     Row Name 10/15/18 1543          Physical Findings    Overall Physical Appearance generalized wasting;underweight             Estimated/Assessed Needs     Row Name 10/15/18 1546          Calculation Measurements    Weight Used For Calculations 44.8 kg (98 lb 12.3 oz)        Estimated/Assessed Needs    Additional Documentation KCAL/KG (Group);Fluid Requirements (Group);Protein Requirements (Group)        KCAL/KG    14 Kcal/Kg (kcal) 627.2     15 Kcal/Kg (kcal) 672     18 Kcal/Kg (kcal) 806.4      20 Kcal/Kg (kcal) 896     25 Kcal/Kg (kcal) 1120     30 Kcal/Kg (kcal) 1344     35 Kcal/Kg (kcal) 1568     40 Kcal/Kg (kcal) 1792     45 Kcal/Kg (kcal) 2016     50 Kcal/Kg (kcal) 2240     kcal/kg (Specify) 30        Kearney-St. Jeor Equation    RMR (Kearney-St. Jeor Equation) 827.13        Protein Requirements    Est Protein Requirement Amount (gms/kg) 1.2 gm protein     Estimated Protein Requirements (gms/day) 53.76        Fluid Requirements    Estimated Fluid Requirements (mL/day) 1350     Estimated Fluid Requirement Method RDA Method     RDA Method (mL) 1350     Abelardo-Cira Method (over 20 kg) 2396             Nutrition Prescription Ordered     Row Name 10/15/18 1545          Nutrition Prescription PO    Current PO Diet Regular     Common Modifiers Cardiac             Evaluation of Received Nutrient/Fluid Intake     Row Name 10/15/18 1545          Calculation Measurements    Weight Used For Calculations 44.8 kg (98 lb 12.3 oz)        PO Evaluation    Number of Days PO Intake Evaluated Insufficient Data             Evaluation of Prescribed Nutrient/Fluid Intake     Row Name 10/15/18 1545          Calculation Measurements    Weight Used For Calculations 44.8 kg (98 lb 12.3 oz)           Electronically signed by:  Zoya Mayer RD  10/15/18 3:46 PM

## 2018-10-16 NOTE — PLAN OF CARE
Problem: Fall Risk (Adult)  Goal: Absence of Fall  Outcome: Ongoing (interventions implemented as appropriate)      Problem: Patient Care Overview  Goal: Plan of Care Review  Outcome: Ongoing (interventions implemented as appropriate)   10/16/18 9414   Coping/Psychosocial   Plan of Care Reviewed With patient   Plan of Care Review   Progress improving   OTHER   Outcome Summary Pt resting at this time; VSS; all needs met. Will continue to monitor.     Goal: Individualization and Mutuality  Outcome: Ongoing (interventions implemented as appropriate)    Goal: Discharge Needs Assessment  Outcome: Ongoing (interventions implemented as appropriate)    Goal: Interprofessional Rounds/Family Conf  Outcome: Ongoing (interventions implemented as appropriate)      Problem: Skin Injury Risk (Adult)  Goal: Skin Health and Integrity  Outcome: Ongoing (interventions implemented as appropriate)      Problem: Pneumonia (Adult)  Goal: Signs and Symptoms of Listed Potential Problems Will be Absent, Minimized or Managed (Pneumonia)  Outcome: Ongoing (interventions implemented as appropriate)      Problem: Breathing Pattern Ineffective (Adult)  Goal: Effective Oxygenation/Ventilation  Outcome: Ongoing (interventions implemented as appropriate)    Goal: Anxiety/Fear Reduction  Outcome: Ongoing (interventions implemented as appropriate)

## 2018-10-16 NOTE — THERAPY EVALUATION
Acute Care - Physical Therapy Initial Evaluation  [facility]     Patient Name: Mariaa Pires  : 4/10/1926  MRN: [id number]  Today's Date: 10/16/2018   Onset of Illness/Injury or Date of Surgery: 10/13/18  Date of Referral to PT: 10/15/18  Referring Physician: Dr. North      Admit Date: 10/13/2018    Visit Dx:     ICD-10-CM ICD-9-CM   1. Acute on chronic respiratory failure with hypoxia (CMS/HCC) J96.21 518.84     799.02   2. Pneumonia due to infectious organism, unspecified laterality, unspecified part of lung J18.9 136.9     484.8   3. NSTEMI (non-ST elevated myocardial infarction) (CMS/[facility]) I21.4 410.70   4. Oropharyngeal dysphagia R13.12 787.22   5. Impaired mobility and ADLs Z74.09 799.89   6. Impaired functional mobility, balance, gait, and endurance Z74.09 V49.89     Patient Active Problem List   Diagnosis   • Pneumonia   • Bilateral pleural effusion   • Osteoporosis   • Acute respiratory failure with hypoxia and hypercapnia (CMS/HCC)   • Diabetes mellitus (CMS/HCC)   • Hypertension   • Pulmonary hypertension (CMS/[facility])   • Aortic valve stenosis     Past Medical History:   Diagnosis Date   • Diabetes mellitus (CMS/HCC) 10/14/2018   • Hypertension    • Osteoporosis 10/14/2018     History reviewed. No pertinent surgical history.     PT ASSESSMENT (last 12 hours)      Physical Therapy Evaluation     Row Name 10/16/18 0842          PT Evaluation Time/Intention    Subjective Information no complaints  -CW     Document Type evaluation  -CW     Mode of Treatment co-treatment;physical therapy;occupational therapy  -CW     Patient Effort good  -CW     Symptoms Noted During/After Treatment fatigue  -CW     Row Name 10/16/18 0842          General Information    Patient Profile Reviewed? yes  -CW     Onset of Illness/Injury or Date of Surgery 10/13/18  -CW     Referring Physician Dr. North  -CW     Patient Observations alert;cooperative;agree to therapy  -CW     Patient/Family Observations no family  present initally, son and daughter-in-law present  -CW     General Observations of Patient pt supine HOB elevated, on 2 L O2 via NC, tele, IV,   -CW     Prior Level of Function independent:;all household mobility;transfer   family does IADL's  -CW     Equipment Currently Used at Home commode, bedside;grab bar;oxygen;walker, rolling;wheelchair;ramp;bath bench  -CW     Pertinent History of Current Functional Problem Pt came in with SOB, dx with pneumonia  -CW     Existing Precautions/Restrictions fall;oxygen therapy device and L/min  -CW     Limitations/Impairments hearing;safety/cognitive  -CW     Equipment Issued to Patient gait belt  -CW     Risks Reviewed patient and family:;LOB;nausea/vomiting;dizziness;increased discomfort;change in vital signs;increased drainage;lines disloged  -CW     Benefits Reviewed patient and family:;improve function;increase independence;increase strength;increase balance;decrease pain;decrease risk of DVT;improve skin integrity;increase knowledge  -CW     Barriers to Rehab cognitive status;hearing deficit  -CW     Row Name 10/16/18 0842          Relationship/Environment    Lives With alone  -CW     Concerns About Impact on Relationships looking for sitters, especially at night; family reported last 2 weeks decreased safety, and independence with ADL's, multiple falls, they report pt forgets to use RW in house, daughter in law concerned with getting 24/7 at home, discussed with case management.  -CW     Row Name 10/16/18 0842          Resource/Environmental Concerns    Current Living Arrangements home/apartment/condo  -CW     Row Name 10/16/18 0842          Stairs Within Home, Primary    Number of Stairs, Within Home, Primary one  -CW     Stairs Comment, Within Home, Primary family room to kitchen no handrails  -CW     Row Name 10/16/18 0842          Cognitive Assessment/Interventions    Additional Documentation Cognitive Assessment/Intervention (Group)  -CW     Row Name 10/16/18 0810           Cognitive Assessment/Intervention- PT/OT    Affect/Mental Status (Cognitive) confused  -CW     Orientation Status (Cognition) oriented x 4;verbal cues/prompts needed for orientation  -CW     Follows Commands (Cognition) follows one step commands;75-90% accuracy;verbal cues/prompting required;repetition of directions required;physical/tactile prompts required  -CW     Safety Deficit (Cognitive) moderate deficit;severe deficit  -CW     Personal Safety Interventions fall prevention program maintained;gait belt;nonskid shoes/slippers when out of bed;supervised activity  -CW     Row Name 10/16/18 0842          Bed Mobility Assessment/Treatment    Bed Mobility Assessment/Treatment supine-sit;sit-supine  -CW     Supine-Sit [place] (Bed Mobility) verbal cues   SBA  -CW     Sit-Supine [place] (Bed Mobility) verbal cues   SBA  -CW     Bed Mobility, Safety Issues decreased use of arms for pushing/pulling;decreased use of legs for bridging/pushing;impaired trunk control for bed mobility  -CW     Assistive Device (Bed Mobility) bed rails;head of bed elevated  -CW     Comment (Bed Mobility) pt required extended time and effort and reported feeling SOB  -CW     Row Name 10/16/18 0842          Transfer Assessment/Treatment    Transfer Assessment/Treatment sit-stand transfer;stand-sit transfer  -CW     Comment (Transfers) pt very inconsistent with t/f and standing balance, tendency to lean backwards, trouble with hand placement  -CW     Sit-Stand [place] (Transfers) minimum assist (75% patient effort)  -CW     Stand-Sit [place] (Transfers) minimum assist (75% patient effort);moderate assist (50% patient effort);verbal cues;nonverbal cues (demo/gesture)   to sit in correct spot, cues to wait until safe to sit  -CW     Row Name 10/16/18 0842          Sit-Stand Transfer    Assistive Device (Sit-Stand Transfers) walker, front-wheeled  -     Row Name 10/16/18 0842          Stand-Sit Transfer    Assistive  Device (Stand-Sit Transfers) walker, front-wheeled  -     Row Name 10/16/18 08          Gait/Stairs Assessment/Training    Gait/Stairs Assessment/Training gait/ambulation independence;gait/ambulation assistive device;distance ambulated  -     [place] Level (Gait) maximum assist (25% patient effort);verbal cues;other (see comments)   cues for AD use  -     Assistive Device (Gait) walker, front-wheeled  -     Distance in Feet (Gait) 15 ft.  -     Pattern (Gait) step-to  -CW     Deviations/Abnormal Patterns (Gait) gait speed decreased;festinating/shuffling;stride length decreased  -     Row Name 10/16/18 08          General ROM    GENERAL ROM COMMENTS BLE WFL throughout  -CW     Row Name 10/16/18 0842          MMT (Manual Muscle Testing)    General MMT Comments BLE grossly 3+/5 throughout  -CW     Row Name 10/16/18 0842          Sensory Assessment/Intervention    Sensory General Assessment no sensation deficits identified  -     Row Name 10/16/18 42          Hearing Assessment    Hearing Status hearing impairment, bilaterally  -     Row Name 10/16/18 [facility]          Vision Assessment/Intervention    Visual Impairment/Limitations corrective lenses full time  -     Row Name 10/16/18 0842          Pain Scale: Numbers Pre/Post-Treatment    Pain Scale: Numbers, Pretreatment 8/10  -CW     Pain Scale: Numbers, Post-Treatment 6/10  -CW     Pain Location throat  -     Pain Intervention(s) Repositioned;Ambulation/increased activity;Distraction;Rest  -     Row Name 10/16/18 0842          Coping    Observed Emotional State cooperative;pleasant  -     Row Name 10/16/18 0842          Plan of Care Review    Plan of Care Reviewed With patient;family  -     Row Name 10/16/18 [facility]          Physical Therapy Clinical Impression    Date of Referral to PT 10/15/18  -     PT Diagnosis (PT Clinical Impression) Impaired functional mobility, endurance, gait, and balance  -     Patient/Family Goals  Statement (PT Clinical Impression) To regain prior level of function, independence with t/f, household mobility  -CW     Criteria for Skilled Interventions Met (PT Clinical Impression) yes  -CW     Pathology/Pathophysiology Noted (Describe Specifically for Each System) musculoskeletal;pulmonary  -CW     Impairments Found (describe specific impairments) aerobic capacity/endurance;arousal, attention, and cognition;gait, locomotion, and balance;muscle performance;ventilation and respiration/gas exchange  -CW     Functional Limitations in Following Categories (Describe Specific Limitations) self-care;home management;community/leisure  -CW     Rehab Potential (PT Clinical Summary) fair, will monitor progress closely  -CW     Care Plan Review (PT) evaluation/treatment results reviewed;patient/other agree to care plan  -CW     Care Plan Review, Other Participant (PT Clinical Impression) family  -CW     Patient/Family Concerns, Anticipated Discharge Disposition (PT) Concerned with attaining 24/7 care  -CW     Row Name 10/16/18 0842          Vital Signs    Pre Systolic BP Rehab 185  -CW     Pre Treatment Diastolic BP 68  -CW     Post Systolic BP Rehab 165   RN notified  -CW     Post Treatment Diastolic BP 67  -CW     Pretreatment Heart Rate (beats/min) 76  -CW     Intratreatment Heart Rate (beats/min) 80  -CW     Posttreatment Heart Rate (beats/min) 85  -CW     Pre SpO2 (%) 99  -CW     O2 Delivery Pre Treatment supplemental O2  -CW     Intra SpO2 (%) 98  -CW     O2 Delivery Intra Treatment supplemental O2  -CW     Post SpO2 (%) 97  -CW     O2 Delivery Post Treatment supplemental O2  -CW     Pre Patient Position Supine  -CW     Intra Patient Position Standing  -CW     Post Patient Position Supine  -CW     Row Name 10/16/18 0842          Physical Therapy Goals    Bed Mobility Goal Selection (PT) bed mobility, PT goal 1  -CW     Transfer Goal Selection (PT) transfer, PT goal 1  -CW     Gait Training Goal Selection (PT) gait  training, PT goal 1  -CW     Stairs Goal Selection (PT) stairs, PT goal 1  -CW     Additional Documentation Stairs Goal Selection (PT) (Row)  -CW     Row Name 10/16/18 0842          Bed Mobility Goal 1 (PT)    Activity/Assistive Device (Bed Mobility Goal 1, PT) bed mobility activities, all  -CW     [place] Level/Cues Needed (Bed Mobility Goal 1, PT) conditional independence  -CW     Time Frame (Bed Mobility Goal 1, PT) 2 days  -CW     Progress/Outcomes (Bed Mobility Goal 1, PT) goal not met  -CW     Row Name 10/16/18 08          Transfer Goal 1 (PT)    Activity/Assistive Device (Transfer Goal 1, PT) sit-to-stand/stand-to-sit  -CW     [place] Level/Cues Needed (Transfer Goal 1, PT) supervision required;conditional independence  -CW     Time Frame (Transfer Goal 1, PT) 2 days  -CW     Progress/Outcome (Transfer Goal 1, PT) goal not met  -CW     Row Name 10/16/18 08          Gait Training Goal 1 (PT)    Activity/Assistive Device (Gait Training Goal 1, PT) gait (walking locomotion);assistive device use;decrease fall risk;diminish gait deviation;improve balance and speed;increase endurance/gait distance;increase energy conservation  -CW     [place] Level (Gait Training Goal 1, PT) minimum assist (75% or more patient effort);moderate assist (50-74% patient effort)  -CW     Distance (Gait Goal 1, PT) 25 ft  -CW     Time Frame (Gait Training Goal 1, PT) 2 - 3 days  -CW     Progress/Outcome (Gait Training Goal 1, PT) goal not met  -CW     Row Name 10/16/18 0842          Stairs Goal 1 (PT)    Activity/Assistive Device (Stairs Goal 1, PT) ascending stairs;descending stairs;decrease fall risk  -CW     [place] Level/Cues Needed (Stairs Goal 1, PT) minimum assist (75% or more patient effort)  -CW     Number of Stairs (Stairs Goal 1, PT) 1 stair to be able to get into/out of family room  -CW     Time Frame (Stairs Goal 1, PT) long term goal (LTG)  -CW     Progress/Outcome (Stairs Goal 1, PT) goal not met   -CW     Row Name 10/16/18 0842          Positioning and Restraints    Pre-Treatment Position in bed  -CW     Post Treatment Position bed  -CW     In Bed supine;call light within reach;encouraged to call for assist;exit alarm on;with family/caregiver;side rails up x3  -CW     Row Name 10/16/18 0842          Living Environment    Home Accessibility wheelchair accessible;stairs within home;tub/shower is not walk in  -       User Key  (r) = Recorded By, (t) = Taken By, (c) = Cosigned By    Initials Name Provider Type    Cadnida Gonzalez, PT Physical Therapist          Physical Therapy Education     Title: PT OT SLP Therapies (Active)     Topic: Physical Therapy (Active)     Point: Mobility training (Done)    Learning Progress Summary     Learner Status Readiness Method Response Comment Documented by    Patient Done Acceptance E,D VU,NR Role of PT in acute care, use of gait belt, use of RW, deep breathing.  10/16/18 1428    Family Done Acceptance E,D VU,NR Role of PT in acute care, use of gait belt, use of RW, deep breathing.  10/16/18 1428          Point: Body mechanics (Done)    Learning Progress Summary     Learner Status Readiness Method Response Comment Documented by    Patient Done Acceptance E,D VU,NR Role of PT in acute care, use of gait belt, use of RW, deep breathing.  10/16/18 1428    Family Done Acceptance E,D VU,NR Role of PT in acute care, use of gait belt, use of RW, deep breathing.  10/16/18 1428          Point: Precautions (Done)    Learning Progress Summary     Learner Status Readiness Method Response Comment Documented by    Patient Done Acceptance E,D VU,NR Role of PT in acute care, use of gait belt, use of RW, deep breathing.  10/16/18 1428    Family Done Acceptance E,D VU,NR Role of PT in acute care, use of gait belt, use of RW, deep breathing.  10/16/18 1428                      User Key     Initials Effective Dates Name Provider Type Discipline     10/04/18 -  Kalyan  Candida, PT Physical Therapist PT                PT Recommendation and Plan  Anticipated Discharge Disposition (PT): home with 24/7 care  Planned Therapy Interventions (PT Eval): balance training, bed mobility training, gait training, home exercise program, stair training, strengthening, transfer training  Therapy Frequency (PT Clinical Impression): other (see comments) (5-14x/week)  Outcome Summary/Treatment Plan (PT)  Anticipated Discharge Disposition (PT): home with 24/7 care  Patient/Family Concerns, Anticipated Discharge Disposition (PT): Concerned with attaining 24/7 care  Plan of Care Reviewed With: patient, family  Progress: improving  Outcome Summary: PT eval on this date with OT co-eval. Pt is 89 y.o. female who came to ED with SOB and dx with pneumonia. Pt BP elevated and RN notified. Pt demonstrated difficulty with sequencing, problem solving, balance, and motor planning  throughout evaluation. Pt eval revealed decreased LE strength, impaired functional mobility, endurance, and balance. Pt required SBA and verbal cues for supine to sit, sit to supine, min A for sit to stand and stand to sit with FWW, and max A with ambulating 15 ft with FWW in room. Pt would benefit from further skilled physical therapy to address these impairments, improve safety awareness, AD use, and reduce falls risk. Pt currently lives alone and is unsafe to return home without 24/7 care, discharge to SNF or home with 24/7 skilled care and home health recommended when appropriate.          Outcome Measures     Row Name 10/16/18 1000 10/16/18 0842 10/16/18 0841       How much help from another person do you currently need...    Turning from your back to your side while in flat bed without using bedrails?  -- 3  -CW  --    Moving from lying on back to sitting on the side of a flat bed without bedrails?  -- 3  -CW  --    Moving to and from a bed to a chair (including a wheelchair)?  -- 2  -CW  --    Standing up from a chair using  your arms (e.g., wheelchair, bedside chair)?  -- 2  -CW  --    Climbing 3-5 steps with a railing?  -- 1  -CW  --    To walk in hospital room?  -- 2  -CW  --    AM-PAC 6 Clicks Score  -- 13  -CW  --       How much help from another is currently needed...    Putting on and taking off regular lower body clothing? 2  -LW  -- 2  -BH    Bathing (including washing, rinsing, and drying) 2  -LW  -- 2  -BH    Toileting (which includes using toilet bed pan or urinal) 2  -LW  -- 2  -BH    Putting on and taking off regular upper body clothing 2  -LW  -- 2  -BH    Taking care of personal grooming (such as brushing teeth) 3  -LW  -- 3  -BH    Eating meals 3  -LW  -- 3  -BH    Score 14  -LW  -- 14  -BH       Functional Assessment    Outcome Measure Options  -- AM-PAC 6 Clicks Basic Mobility (PT)  -CW AM-PAC 6 Clicks Daily Activity (OT)  -      User Key  (r) = Recorded By, (t) = Taken By, (c) = Cosigned By    Initials Name Provider Type     Liyah Mcnair, OTR/L Occupational Therapist    LW Alexa Desir, MONSIVAIS/L Occupational Therapy Assistant    CW Candida iBggs, PT Physical Therapist           Time Calculation:         PT Charges     Row Name 10/16/18 0841             Time Calculation    Start Time 0842  -CW      Stop Time 0938  -CW      Time Calculation (min) 56 min  -CW      PT Received On 10/16/18  -CW      PT Goal Re-Cert Due Date 10/29/18  -CW         Time Calculation- PT    Total Timed Code Minutes- PT 15 minute(s)  -CW         Timed Charges    67877 - PT Therapeutic Activity Minutes 15  -CW        User Key  (r) = Recorded By, (t) = Taken By, (c) = Cosigned By    Initials Name Provider Type    CW Candida Biggs, PT Physical Therapist        Therapy Suggested Charges     Code   Minutes Charges    30085 (CPT®) Hc Pt Neuromusc Re Education Ea 15 Min      39954 (CPT®) Hc Pt Ther Proc Ea 15 Min      79177 (CPT®) Hc Gait Training Ea 15 Min      86754 (CPT®) Hc Pt Therapeutic Act Ea 15 Min 15 1    60092 (CPT®) Hc Pt  Manual Therapy Ea 15 Min      25539 (CPT®) Hc Pt Iontophoresis Ea 15 Min      90641 (CPT®) Hc Pt Elec Stim Ea-Per 15 Min      00397 (CPT®) Hc Pt Ultrasound Ea 15 Min      41305 (CPT®) Hc Pt Self Care/Mgmt/Train Ea 15 Min      76887 (CPT®) Hc Pt Prosthetic (S) Train Initial Encounter, Each 15 Min      24127 (CPT®) Hc Pt Orthotic(S)/Prosthetic(S) Encounter, Each 15 Min      09516 (CPT®) Hc Orthotic(S) Mgmt/Train Initial Encounter, Each 15min      Total  15 1        Therapy Charges for Today     Code Description Service Date Service Provider Modifiers Qty    59023435652 HC PT MOBILITY CURRENT 10/16/2018 Candida Biggs, PT GP, CK 1    89887397443 HC PT MOBILITY PROJECTED 10/16/2018 Candida Biggs, PT GP, CJ 1    23599658727 HC PT EVAL MOD COMPLEXITY 2 10/16/2018 Candida Biggs, PT GP 1    55999229504 HC PT THERAPEUTIC ACT EA 15 MIN 10/16/2018 Candida Biggs, PT GP 1          PT G-Codes  PT Professional Judgement Used?: Yes  Outcome Measure Options: AM-PAC 6 Clicks Basic Mobility (PT)  AM-PAC 6 Clicks Score: 13  Score: 14  Functional Limitation: Mobility: Walking and moving around  Mobility: Walking and Moving Around Current Status (): At least 40 percent but less than 60 percent impaired, limited or restricted  Mobility: Walking and Moving Around Goal Status (): At least 20 percent but less than 40 percent impaired, limited or restricted      Candida Biggs, PT  10/16/2018

## 2018-10-16 NOTE — THERAPY TREATMENT NOTE
Acute Care - Occupational Therapy Treatment Note  [facility]     Patient Name: Mariaa Pires  : 4/10/1926  MRN: [id number]  Today's Date: 10/16/2018  Onset of Illness/Injury or Date of Surgery: 10/13/18  Date of Referral to OT: 10/15/18  Referring Physician: Dr. North    Admit Date: 10/13/2018       ICD-10-CM ICD-9-CM   1. Acute on chronic respiratory failure with hypoxia (CMS/Formerly [facility]) J96.21 518.84     799.02   2. Pneumonia due to infectious organism, unspecified laterality, unspecified part of lung J18.9 136.9     484.8   3. NSTEMI (non-ST elevated myocardial infarction) (CMS/Formerly [facility]) I21.4 410.70   4. Oropharyngeal dysphagia R13.12 787.22   5. Impaired mobility and ADLs Z74.09 799.89     Patient Active Problem List   Diagnosis   • Pneumonia   • Bilateral pleural effusion   • Osteoporosis   • Acute respiratory failure with hypoxia and hypercapnia (CMS/Formerly [facility])   • Diabetes mellitus (CMS/Formerly [facility])   • Hypertension   • Pulmonary hypertension (CMS/Formerly [facility])   • Aortic valve stenosis     Past Medical History:   Diagnosis Date   • Diabetes mellitus (CMS/HCC) 10/14/2018   • Hypertension    • Osteoporosis 10/14/2018     History reviewed. No pertinent surgical history.    Therapy Treatment          Rehabilitation Treatment Summary     Row Name 10/16/18 1000             Treatment Time/Intention    Discipline occupational therapy assistant  -LW      Document Type therapy note (daily note)  -LW      Subjective Information no complaints  -LW      Mode of Treatment occupational therapy  -LW      Patient/Family Observations Son present  -LW      Care Plan Review care plan/treatment goals reviewed  -LW      Care Plan Review, Other Participant(s) son  -LW      Total Minutes, Occupational Therapy Treatment 40  -LW      Therapy Frequency (OT Eval) other (see comments)   5-7 days per week  -LW      Patient Effort good  -LW      Existing Precautions/Restrictions fall;oxygen therapy device and L/min  -LW      Equipment Issued to Patient  gait belt  -LW      Recorded by [LW] Alexa Desir COTA/L 10/16/18 1207      Row Name 10/16/18 1000             Vital Signs    Pre Systolic BP Rehab 179  -LW      Pre Treatment Diastolic BP 72  -LW      Pretreatment Heart Rate (beats/min) 73  -LW      Posttreatment Heart Rate (beats/min) 77  -LW      Pre SpO2 (%) 98  -LW      O2 Delivery Pre Treatment supplemental O2  -LW      Post SpO2 (%) 97  -LW      O2 Delivery Post Treatment supplemental O2  -LW      Pre Patient Position --   long sitting  -LW      Post Patient Position --   long sitting  -LW      Recorded by [LW] Alexa Desir MONSIVAIS/L 10/16/18 1207      Row Name 10/16/18 1000             Cognitive Assessment/Intervention- PT/OT    Affect/Mental Status (Cognitive) WFL  -LW      Orientation Status (Cognition) oriented x 4  -LW      Follows Commands (Cognition) follows one step commands  -LW      Safety Deficit (Cognitive) moderate deficit  -LW      Personal Safety Interventions fall prevention program maintained;gait belt;nonskid shoes/slippers when out of bed  -LW      Recorded by [LW] Alexa Desir COTA/L 10/16/18 1207      Row Name 10/16/18 1000             Safety Issues, Functional Mobility    Impairments Affecting Function (Mobility) balance;coordination;endurance/activity tolerance  -LW      Recorded by [LW] Alexa Desir COTA/L 10/16/18 1207      Row Name 10/16/18 1000             ADL Assessment/Intervention    BADL Assessment/Intervention grooming  -LW      Recorded by [LW] Alexa Desir MONSIVAIS/L 10/16/18 1207      Row Name 10/16/18 1000             Grooming Assessment/Training    [place] Level (Grooming) hair care, combing/brushing;oral care regimen;wash face, hands;set up;supervision  -LW      Grooming Position long sitting  -LW      Recorded by [LW] Alexa Desir MONSIVAIS/L 10/16/18 1207      Row Name 10/16/18 1000             Therapeutic Exercise    Upper Extremity Range of Motion (Therapeutic Exercise) other (see comments)    ROM in all planes  -LW      Position (Therapeutic Exercise) seated  -LW      Sets/Reps (Therapeutic Exercise) 10x  -LW      Equipment (Therapeutic Exercise) resistive bands   Yellow theraband  -LW      Recorded by [LW] Alexa Desir COTA/L 10/16/18 1207      Row Name 10/16/18 1000             Positioning and Restraints    Pre-Treatment Position in bed  -LW      Post Treatment Position bed  -LW      In Bed supine;call light within reach;encouraged to call for assist;exit alarm on;with family/caregiver  -LW      Recorded by [LW] Alexa Desir COTA/L 10/16/18 1207      Row Name 10/16/18 1000             Pain Scale: Numbers Pre/Post-Treatment    Pain Scale: Numbers, Pretreatment 0/10 - no pain  -LW      Pain Scale: Numbers, Post-Treatment 0/10 - no pain  -LW      Recorded by [LW] Alexa Desir COTA/L 10/16/18 1207      Row Name 10/16/18 1000             Hearing Assessment    Hearing Status hearing aid, bilateral  -LW      Recorded by [LW] Alexa Desir COTA/L 10/16/18 1207      Row Name 10/16/18 1000             Vision Assessment/Intervention    Visual Impairment/Limitations corrective lenses full time  -LW      Recorded by [LW] Alexa Desir COTA/L 10/16/18 1207      Row Name 10/16/18 1000             Light Touch Sensation Assessment    Left Upper Extremity: Light Touch Sensation Assessment intact  -LW      Right Upper Extremity: Light Touch Sensation Assessment mild impairment, 75% or more correct responses  -LW      Recorded by [LW] Alexa Desir COTA/L 10/16/18 1207      Row Name 10/16/18 1000             Coping    Observed Emotional State accepting;calm;cooperative  -LW      Verbalized Emotional State acceptance  -LW      Recorded by [LW] Alexa Desir COTA/L 10/16/18 1207      Row Name 10/16/18 1000             Plan of Care Review    Plan of Care Reviewed With patient  -LW      Recorded by [LW] Alexa Desir COTA/L 10/16/18 1207      Row Name 10/16/18 1000             Outcome  Summary/Treatment Plan (OT)    Daily Summary of Progress (OT) progress toward functional goals is good  -LW      Plan for Continued Treatment (OT) Continue POC  -LW      Anticipated Discharge Disposition (OT) home with 24/7 care;home with home health;skilled nursing facility  -LW      Recorded by [LW] Alexa Desir COTA/L 10/16/18 1207        User Key  (r) = Recorded By, (t) = Taken By, (c) = Cosigned By    Initials Name Effective Dates Discipline    LW Alexa Desir COTA/L 03/07/18 -  OT                   OT Rehab Goals     Row Name 10/16/18 1000 10/16/18 0841          Transfer Goal 1 (OT)    Activity/Assistive Device (Transfer Goal 1, OT) toilet  -LW toilet  -     [place] Level/Cues Needed (Transfer Goal 1, OT) minimum assist (75% or more patient effort)  -LW minimum assist (75% or more patient effort)  -     Time Frame (Transfer Goal 1, OT) long term goal (LTG);by discharge  - long term goal (LTG);by discharge  -     Progress/Outcome (Transfer Goal 1, OT) goal not met  -LW goal not met  -        Bathing Goal 1 (OT)    Activity/Assistive Device (Bathing Goal 1, OT) bathing skills, all  -LW bathing skills, all  -BH     [place] Level/Cues Needed (Bathing Goal 1, OT) minimum assist (75% or more patient effort)  -LW minimum assist (75% or more patient effort)  -     Time Frame (Bathing Goal 1, OT) long term goal (LTG);by discharge  - long term goal (LTG);by discharge  -     Progress/Outcomes (Bathing Goal 1, OT) goal not met  -LW goal not met  -        Dressing Goal 1 (OT)    Activity/Assistive Device (Dressing Goal 1, OT) dressing skills, all  -LW dressing skills, all  -BH     [place]/Cues Needed (Dressing Goal 1, OT) minimum assist (75% or more patient effort)  -LW minimum assist (75% or more patient effort)  -     Time Frame (Dressing Goal 1, OT) long term goal (LTG);by discharge  -LW long term goal (LTG);by discharge  -     Progress/Outcome (Dressing Goal 1, OT) goal  not met  - goal not met  -        Toileting Goal 1 (OT)    Activity/Device (Toileting Goal 1, OT) toileting skills, all  -LW toileting skills, all  -     [place] Level/Cues Needed (Toileting Goal 1, OT) minimum assist (75% or more patient effort)  -LW minimum assist (75% or more patient effort)  -     Time Frame (Toileting Goal 1, OT) long term goal (LTG);by discharge  - long term goal (LTG);by discharge  -     Progress/Outcome (Toileting Goal 1, OT) goal not met  - goal not met  -        Grooming Goal 1 (OT)    Activity/Device (Grooming Goal 1, OT) grooming skills, all  -LW grooming skills, all  -     [place] (Grooming Goal 1, OT) set-up required;standby assist;verbal cues required;tactile cues required  - set-up required;standby assist;verbal cues required;tactile cues required  -     Time Frame (Grooming Goal 1, OT) long term goal (LTG);by discharge  - long term goal (LTG);by discharge  -     Progress/Outcome (Grooming Goal 1, OT) goal not met  - goal not met  -       User Key  (r) = Recorded By, (t) = Taken By, (c) = Cosigned By    Initials Name Provider Type Discipline     Liyah Mcnair, OTR/L Occupational Therapist OT     Alexa Desir, MONSIVAIS/L Occupational Therapy Assistant OT        Occupational Therapy Education     Title: PT OT SLP Therapies (Active)     Topic: Occupational Therapy (Active)     Point: ADL training (Active)     Description: Instruct learner(s) on proper safety adaptation and remediation techniques during self care or transfers.   Instruct in proper use of assistive devices.   Learning Progress Summary     Learner Status Readiness Method Response Comment Documented by    Patient Done Acceptance E,TB VU   10/16/18 1208     Active Acceptance E NR Educated about OT and POC. Educated on safety throughout including hand placement for t/f. Educated on need for 24/7 care. Educated how to use the gait belt and when to family.  10/16/18 1780     Family Active Acceptance E NR Educated about OT and POC. Educated on safety throughout including hand placement for t/f. Educated on need for 24/7 care. Educated how to use the gait belt and when to family.  10/16/18 1105          Point: Home exercise program (Done)     Description: Instruct learner(s) on appropriate technique for monitoring, assisting and/or progressing therapeutic exercises/activities.   Learning Progress Summary     Learner Status Readiness Method Response Comment Documented by    Patient Done Acceptance E,TB [facility] 10/16/18 1208          Point: Precautions (Active)     Description: Instruct learner(s) on prescribed precautions during self-care and functional transfers.   Learning Progress Summary     Learner Status Readiness Method Response Comment Documented by    Patient Done Acceptance E,TB   LW 10/16/18 1208     Active Acceptance E NR Educated about OT and POC. Educated on safety throughout including hand placement for t/f. Educated on need for 24/7 care. Educated how to use the gait belt and when to family.  10/16/18 1105    Family Active Acceptance E NR Educated about OT and POC. Educated on safety throughout including hand placement for t/f. Educated on need for 24/7 care. Educated how to use the gait belt and when to family.  10/16/18 1105          Point: Body mechanics (Done)     Description: Instruct learner(s) on proper positioning and spine alignment during self-care, functional mobility activities and/or exercises.   Learning Progress Summary     Learner Status Readiness Method Response Comment Documented by    Patient Done Acceptance E,TB VU   10/16/18 1208                      User Key     Initials Effective Dates Name Provider Type Discipline     06/08/18 -  Liyah Mcnair, OTR/L Occupational Therapist OT     03/07/18 -  Alexa Desir, MONSIVAIS/L Occupational Therapy Assistant OT                OT Recommendation and Plan  Outcome Summary/Treatment Plan (OT)  Daily  Summary of Progress (OT): progress toward functional goals is good  Plan for Continued Treatment (OT): Continue POC  Anticipated Discharge Disposition (OT): home with 24/7 care, home with home health, skilled nursing facility  Therapy Frequency (OT Eval): other (see comments) (5-7 days per week)  Daily Summary of Progress (OT): progress toward functional goals is good  Plan of Care Review  Plan of Care Reviewed With: patient  Plan of Care Reviewed With: patient  Outcome Summary: Pt worked on grooming with supervision and set up. Pt also worked on UB strengthening with t-band to improve independence with ADL's        Outcome Measures     Row Name 10/16/18 1000 10/16/18 0841          How much help from another is currently needed...    Putting on and taking off regular lower body clothing? 2  -LW 2  -BH     Bathing (including washing, rinsing, and drying) 2  -LW 2  -BH     Toileting (which includes using toilet bed pan or urinal) 2  -LW 2  -BH     Putting on and taking off regular upper body clothing 2  -LW 2  -BH     Taking care of personal grooming (such as brushing teeth) 3  -LW 3  -BH     Eating meals 3  -LW 3  -BH     Score 14  -LW 14  -        Functional Assessment    Outcome Measure Options  -- AM-PAC 6 Clicks Daily Activity (OT)  -       User Key  (r) = Recorded By, (t) = Taken By, (c) = Cosigned By    Initials Name Provider Type     Liyah Mcnair, OTR/L Occupational Therapist     Alexa Desir, MONSIVAIS/L Occupational Therapy Assistant           Time Calculation:         Time Calculation- OT     Row Name 10/16/18 1210 10/16/18 1110          Time Calculation- OT    OT Start Time 1000  -LW 0841  -     OT Stop Time 1040  -LW 0939  [facility]     OT Time Calculation (min) 40 min  -LW 58 min  -     Total Timed Code Minutes- OT 40 minute(s)  -LW 15 minute(s)  -     OT Received On 10/16/18  -LW 10/16/18  -     OT Goal Re-Cert Due Date  -- 10/29/18  -       User Key  (r) = Recorded By, (t) = Taken By, (c)  = Cosigned By    Initials Name Provider Type     Liyah Mcnair, OTR/L Occupational Therapist    Alexa Baumann COTA/L Occupational Therapy Assistant           Therapy Suggested Charges     Code   Minutes Charges    None           Therapy Charges for Today     Code Description Service Date Service Provider Modifiers Qty    97637702470 HC OT SELF CARE/MGMT/TRAIN EA 15 MIN 10/16/2018 Alexa Desir COTA/L GO 2    05513814100 HC OT THER PROC EA 15 MIN 10/16/2018 Alexa Desir COTA/L GO 1      Non-skid socks and gait belt in place. Toileting offered. Call light and needs within reach. Pt advised to not get up alone and call the nurse for assistance.  Bed alarm on.     OT G-codes  OT Professional Judgement Used?: Yes  OT Functional Scales Options: AM-PAC 6 Clicks Daily Activity (OT)  Score: 14  Functional Limitation: Self care  Self Care Current Status (): At least 60 percent but less than 80 percent impaired, limited or restricted  Self Care Goal Status (): At least 40 percent but less than 60 percent impaired, limited or restricted    ROEL Suárez  10/16/2018

## 2018-10-16 NOTE — CONSULTS
"Consulted with patient and pt's daughter-in-law. Explained palliative care and gave information pamphlet and contact numbers. Performed initial PC assessment. Explained benefits of both palliative care and hospice. Daughter in law states, \"I think palliative care would be really good for her.\" Also states she would like us to come and speak with her  when he is present. Prayed with patient. Pt states mild throat discomfort and states \"swish and swallow helped.\" Will follow up with pt/family tomorrow, unless needed before.   "

## 2018-10-16 NOTE — PLAN OF CARE
Problem: Patient Care Overview  Goal: Plan of Care Review  Outcome: Ongoing (interventions implemented as appropriate)   10/16/18 0841   Coping/Psychosocial   Plan of Care Reviewed With patient;family   OTHER   Outcome Summary OT dion completed this date. Pt BP high and RN notified. Co-eval with PT. Pt displayed decreased sequencing, direction following, motor planning, problem solving, balance, strength, and endurance that decrease safety with ADL. Pt at times was max assist for transfer, standing balance and mobility with leaning backwards/posterior lean where assist not to fall straight back needed. Pt averaged mod assist for toileting. Pt displayed difficulty with self feeding with shaky BUE noted. Pt needs further skilled OT to reach maximum level of independence with ADL. Pt lives alone and is currently not safe to return without assist. OT recommends SNF for rehab to get stronger or home with 24/7 skilled assist and home health therapy.

## 2018-10-16 NOTE — PROGRESS NOTES
[facility]  INPATIENT PROGRESS NOTE    Length of Stay: 2  Date of Admission: 10/13/2018  Primary Care Physician: Breezy Mcfarland MD    Subjective   Chief Complaint:  Shortness of breath  HPI:  Patient states that her shortness of breath is improved, but not at what she would consider her baseline.    Review of Systems   Constitutional: Positive for fatigue. Negative for appetite change, chills, fever and unexpected weight change.   Respiratory: Positive for shortness of breath. Negative for cough, choking, chest tightness and wheezing.    Cardiovascular: Negative for chest pain, palpitations and leg swelling.   Gastrointestinal: Negative for abdominal pain, blood in stool, constipation, diarrhea, nausea and vomiting.   Genitourinary: Negative for dysuria, flank pain and hematuria.   Neurological: Positive for weakness. Negative for dizziness, seizures, syncope, speech difficulty, light-headedness, numbness and headaches.   Hematological: Does not bruise/bleed easily.   Psychiatric/Behavioral: Positive for confusion.        All pertinent negatives and positives are as above. All other systems have been reviewed and are negative unless otherwise stated.     Objective    Temp:  [96.6 °F (35.9 °C)-98.8 °F (37.1 °C)] 98.5 °F (36.9 °C)  Heart Rate:  [73-86] 75  Resp:  [17-20] 20  BP: (119-166)/(53-69) 159/69    Physical Exam   Constitutional: She appears well-developed and well-nourished.   HENT:   Head: Normocephalic and atraumatic.   Eyes: Pupils are equal, round, and reactive to light. EOM are normal.   Neck: Normal range of motion. Neck supple.   Cardiovascular: Normal rate and regular rhythm.  Exam reveals no gallop and no friction rub.    Murmur heard.   Systolic murmur is present with a grade of 2/6   Pulmonary/Chest: Effort normal and breath sounds normal. No respiratory distress. She has no wheezes. She has no rales. She exhibits no tenderness.    Abdominal: Soft. Bowel sounds are normal. She exhibits no distension. There is no tenderness. There is no guarding.   Musculoskeletal: She exhibits edema.   Skin: Skin is warm and dry.   Psychiatric: She has a normal mood and affect. Her behavior is normal. Thought content normal.   Vitals reviewed.          Results Review:  I have reviewed the labs, radiology results, and diagnostic studies.    Laboratory Data:     Results from last 7 days  Lab Units 10/14/18  0630 10/13/18  2309   SODIUM mmol/L 136* 132*   POTASSIUM mmol/L 4.1 5.0   CHLORIDE mmol/L 97 94*   CO2 mmol/L 29.0 26.0   BUN mg/dL 28* 31*   CREATININE mg/dL 0.96 1.08*   GLUCOSE mg/dL 138* 213*   CALCIUM mg/dL 9.3 9.4   BILIRUBIN mg/dL 0.7 0.8   ALK PHOS U/L 154* 166*   ALT (SGPT) U/L 60* 68*   AST (SGOT) U/L 51* 56*   ANION GAP mmol/L 10.0 12.0     Estimated Creatinine Clearance: 29.7 mL/min (by C-G formula based on SCr of 0.96 mg/dL).            Results from last 7 days  Lab Units 10/14/18  0630 10/13/18  2309   WBC 10*3/mm3 9.05 11.37*   HEMOGLOBIN g/dL 12.0 12.6   HEMATOCRIT % 37.1 38.3   PLATELETS 10*3/mm3 286 333       Results from last 7 days  Lab Units 10/13/18  2309   INR  1.04       Culture Data:   Blood Culture   Date Value Ref Range Status   10/14/2018 No growth at 2 days  Preliminary   10/14/2018 No growth at 2 days  Preliminary     No results found for: URINECX  No results found for: RESPCX  No results found for: WOUNDCX  No results found for: STOOLCX  No components found for: BODYFLD    Radiology Data:   Imaging Results (last 24 hours)     ** No results found for the last 24 hours. **          I have reviewed the patient's current medications.     Assessment/Plan     Active Hospital Problems    Diagnosis   • Pulmonary hypertension (CMS/HCC)   • Aortic valve stenosis   • Bilateral pleural effusion   • Osteoporosis   • Acute respiratory failure with hypoxia and hypercapnia (CMS/HCC)   • Diabetes mellitus (CMS/HCC)   • Hypertension        Plan:    1.  Severe pulmonary hypertension:  Discussed with patient and family the likelihood that symptom mitigation would be our best and only option.  She sees Dr. Valadez routinely.  Recently Dr. Valadez saw her and recommended no change.  It is unclear to me when he last obtained an echocardiogram.  Current echo shows severe tricuspid regurgitation.  Likely this is the cause of her PH.  She is not a candidate for invasive testing or therapy.  Continue current treatment for now.  2.  Acute respiratory failure with hypoxia and hypercapnia:  Continue current treatment.  Symptomatically better.  3.  Systemic hypertension  4.  DM     Discussed diagnosis and options with family.  They state that they believe that she would be best suited in a SNF.  I agree.  I introduced to them the concept of Palliative Care, and they were interested in speaking with the Palliative care team.        Discharge Planning: I expect patient to be discharged to SNF in 3-4 days.        This document has been electronically signed by Galen Blunt MD on October 16, 2018 10:42 AM

## 2018-10-16 NOTE — PLAN OF CARE
Problem: Patient Care Overview  Goal: Plan of Care Review  Outcome: Ongoing (interventions implemented as appropriate)   10/16/18 1208   Coping/Psychosocial   Plan of Care Reviewed With patient   Plan of Care Review   Progress no change   OTHER   Outcome Summary Pt worked on grooming with supervision and set up. Pt also worked on UB strengthening with t-band to improve independence with ADL's

## 2018-10-16 NOTE — PLAN OF CARE
Problem: Patient Care Overview  Goal: Plan of Care Review  Outcome: Ongoing (interventions implemented as appropriate)   10/16/18 9687   Coping/Psychosocial   Plan of Care Reviewed With patient;family   Plan of Care Review   Progress improving   OTHER   Outcome Summary PT eval on this date with OT co-eval. Pt is 89 y.o. female who came to ED with SOB and dx with pneumonia. Pt BP elevated and RN notified. Pt demonstrated difficulty with sequencing, problem solving, balance, and motor planning throughout evaluation. Pt eval revealed decreased LE strength, impaired functional mobility, endurance, and balance. Pt required SBA and verbal cues for supine to sit, sit to supine, min A for sit to stand and stand to sit with FWW, and max A with ambulating 15 ft with FWW in room. Pt would benefit from further skilled physical therapy to address these impairments, improve safety awareness, AD use, and reduce falls risk. Pt currently lives alone and is unsafe to return home without 24/7 care, discharge to SNF or home with 24/7 skilled care and home health recommended when appropriate.

## 2018-10-16 NOTE — THERAPY EVALUATION
Acute Care - Occupational Therapy Initial Evaluation  [facility]     Patient Name: Mariaa Pires  : 4/10/1926  MRN: [id number]  Today's Date: 10/16/2018  Onset of Illness/Injury or Date of Surgery: 10/13/18  Date of Referral to OT: 10/15/18  Referring Physician: Dr. North    Admit Date: 10/13/2018       ICD-10-CM ICD-9-CM   1. Acute on chronic respiratory failure with hypoxia (CMS/[facility]) J96.21 518.84     799.02   2. Pneumonia due to infectious organism, unspecified laterality, unspecified part of lung J18.9 136.9     484.8   3. NSTEMI (non-ST elevated myocardial infarction) (CMS/[facility]) I21.4 410.70   4. Oropharyngeal dysphagia R13.12 787.22   5. Impaired mobility and ADLs Z74.09 799.89     Patient Active Problem List   Diagnosis   • Pneumonia   • Bilateral pleural effusion   • Osteoporosis   • Acute respiratory failure with hypoxia and hypercapnia (CMS/[facility])   • Diabetes mellitus (CMS/[facility])   • Hypertension   • Pulmonary hypertension (CMS/[facility])   • Aortic valve stenosis     Past Medical History:   Diagnosis Date   • Diabetes mellitus (CMS/[facility]) 10/14/2018   • Hypertension    • Osteoporosis 10/14/2018     History reviewed. No pertinent surgical history.       OT ASSESSMENT FLOWSHEET (last 72 hours)      Occupational Therapy Evaluation     Row Name 10/16/18 0841                   OT Evaluation Time/Intention    Subjective Information no complaints  -        Document Type evaluation  -        Mode of Treatment co-treatment;occupational therapy;physical therapy  -        Total Evaluation Minutes, Occupational Therapy 58  -        Patient Effort good  -        Symptoms Noted During/After Treatment fatigue  -           General Information    Patient Profile Reviewed? yes  -        Onset of Illness/Injury or Date of Surgery 10/13/18  -        Referring Physician Dr. North  -        Patient Observations alert;agree to therapy;cooperative  -        Patient/Family Observations no family  present; son and dtr in law present   -        General Observations of Patient pt supine HOB up on O2 2L, IV, tele  -        Prior Level of Function independent:;all household mobility;transfer;ADL's   family does IADL  -        Equipment Currently Used at Home grab bar;commode, bedside;oxygen;walker, rolling;wheelchair;ramp;bath bench  -        Pertinent History of Current Functional Problem Pt came in with SOB, dx pneumonia  -        Existing Precautions/Restrictions fall;oxygen therapy device and L/min  -        Limitations/Impairments hearing;safety/cognitive  -        Equipment Issued to Patient gait belt  -        Risks Reviewed patient and family:;LOB;increased discomfort;change in vital signs  -        Benefits Reviewed patient and family:;improve function;increase independence;increase strength;increase balance  -        Barriers to Rehab cognitive status;hearing deficit  -           Relationship/Environment    Lives With alone  -        Concerns About Impact on Relationships looking for sitters, especially at night ; family reported past 2 weeks decreased safety and independence with ADL, mulitple falls ; they report pt forgets to use RW in house ; Dtr in law concerned about getting 24/7 at home, discussed with case management.   -           Resource/Environmental Concerns    Current Living Arrangements home/apartment/condo  -           Stairs Within Home, Primary    Number of Stairs, Within Home, Primary one  -        Stairs Comment, Within Home, Primary famiy room to kitchen no handrails   -           Cognitive Assessment/Interventions    Additional Documentation Cognitive Assessment/Intervention (Group)  -           Cognitive Assessment/Intervention- PT/OT    Affect/Mental Status (Cognitive) confused  -        Orientation Status (Cognition) oriented x 4;verbal cues/prompts needed for orientation  -        Follows Commands (Cognition) follows one step commands;75-90%  accuracy;verbal cues/prompting required;repetition of directions required;physical/tactile prompts required  -        Safety Deficit (Cognitive) moderate deficit;severe deficit  -        Personal Safety Interventions fall prevention program maintained;gait belt;nonskid shoes/slippers when out of bed;supervised activity  -           Safety Issues, Functional Mobility    Impairments Affecting Function (Mobility) balance;cognition;coordination;endurance/activity tolerance;pain;motor control;strength;shortness of breath;postural/trunk control;motor planning;range of motion (ROM)  -           Bed Mobility Assessment/Treatment    Bed Mobility Assessment/Treatment supine-sit;sit-supine  -        Supine-Sit [place] (Bed Mobility) verbal cues   SBA  -        Sit-Supine [place] (Bed Mobility) verbal cues   A  -        Bed Mobility, Safety Issues decreased use of legs for bridging/pushing;decreased use of arms for pushing/pulling;impaired trunk control for bed mobility  -        Assistive Device (Bed Mobility) bed rails;head of bed elevated  -        Comment (Bed Mobility) pt required extended time and effort and reported feeling SOB.   -           Functional Mobility    Functional Mobility- Ind. Level maximum assist (25% patient effort)  -        Functional Mobility- Device rolling walker  -        Functional Mobility- Safety Issues sequencing ability decreased;balance decreased during turns;step length decreased;weight-shifting ability decreased;loses balance backward;supplemental O2  -        Functional Mobility- Comment Pt very inconsistent with standing balance with postier lean noted. Pt at times was max assist with balance and once starting to get more mobile less assist to go from one side of the bed to the other.   -           Transfer Assessment/Treatment    Transfer Assessment/Treatment sit-stand transfer;stand-sit transfer;toilet transfer  -        Comment (Transfers) pt  very inconsistent with t/f and standing balance with leaning backwards, trouble with hand placement.   -           Sit-Stand Transfer    Sit-Stand [place] (Transfers) minimum assist (75% patient effort)  -           Stand-Sit Transfer    Stand-Sit [place] (Transfers) minimum assist (75% patient effort);moderate assist (50% patient effort);verbal cues;nonverbal cues (demo/gesture)   to sit in the correct spot and to wait to sit till safe  -        Assistive Device (Stand-Sit Transfers) walker, front-wheeled  -           Toilet Transfer    Type (Toilet Transfer) stand-sit;sit-stand  -        [place] Level (Toilet Transfer) maximum assist (25% patient effort)  -        Assistive Device (Toilet Transfer) commode, bedside without drop arms;walker, front-wheeled  -           ADL Assessment/Intervention    BADL Assessment/Intervention feeding;upper body dressing;toileting  -           Upper Body Dressing Assessment/Training    Comment (Upper Body Dressing) to don a gown like a robe max assist with decreased problem solving, sequencing, and motor planning and direction following. Pt struggled reaching and completion. To doff pt required max encouragement and redirection. pt to doff gown like robe required max redirection and encouragement of how to get one arm out first no physical assist but min assist due to amount of redirection and verbal assist to sequence and complete.   -           Self-Feeding Assessment/Training    Comment (Feeding) min spillage, set up and extended time; pt reports shaky movements make it hard to eat   -           Toileting Assessment/Training    [place] Level (Toileting) toileting skills;moderate assist (50% patient effort);maximum assist (25% patient effort)   inconsistent increased assist for clothing management  -        Assistive Devices (Toileting) commode, bedside without drop arms  -           BADL Safety/Performance    Impairments, BADL  Safety/Performance balance;cognition;endurance/activity tolerance;coordination;pain;strength;trunk/postural control;shortness of breath;motor control;motor planning;range of motion  -           General ROM    GENERAL ROM COMMENTS BUE WFL fair+ digit to thumb, shaky movements; reports pain in right shoulder   -           MMT (Manual Muscle Testing)    General MMT Comments BUE  grossly 3+/5 BUE grossly 3+5  -           Sensory Assessment/Intervention    Additional Documentation Hearing Assessment (Group);Vision Assessment/Intervention (Group)  -           Light Touch Sensation Assessment    Left Upper Extremity: Light Touch Sensation Assessment intact  -        Right Upper Extremity: Light Touch Sensation Assessment mild impairment, 75% or more correct responses  -           Hearing Assessment    Hearing Status hearing impairment, bilaterally  -           Vision Assessment/Intervention    Visual Impairment/Limitations corrective lenses full time  -           Positioning and Restraints    Pre-Treatment Position in bed  -        Post Treatment Position bed  -        In Bed supine;call light within reach;encouraged to call for assist;exit alarm on;with family/caregiver;side rails up x3  -           Pain Assessment    Additional Documentation Pain Scale: Numbers Pre/Post-Treatment (Group)  -           Pain Scale: Numbers Pre/Post-Treatment    Pain Scale: Numbers, Pretreatment 8/10   better than it was   -        Pain Scale: Numbers, Post-Treatment 6/10  -        Pain Location throat  -        Pain Intervention(s) Repositioned;Ambulation/increased activity;Distraction;Rest  -           Plan of Care Review    Plan of Care Reviewed With patient;family  -           Clinical Impression (OT)    Date of Referral to OT 10/15/18  -        OT Diagnosis Impaired mobility and ADL  -        Prognosis (OT Eval) fair  -        Functional Level at Time of Evaluation (OT Eval) pt decreased  strength, endurance, safety and independence with ADL.   -        Patient/Family Goals Statement (OT Eval) to go home - family attempting to get sitters  -        Criteria for Skilled Therapeutic Interventions Met (OT Eval) yes;treatment indicated  -        Rehab Potential (OT Eval) fair, will monitor progress closely  -        Therapy Frequency (OT Eval) other (see comments)   5-7 days a week  -        Predicted Duration of Therapy Intervention (Therapy Eval) until d/c  -        Care Plan Review (OT) evaluation/treatment results reviewed;care plan/treatment goals reviewed;risks/benefits reviewed;patient/other agree to care plan  -        Care Plan Review, Other Participant (OT Eval) family  -        Anticipated Discharge Disposition (OT) home with 24/7 care;home with home health;skilled nursing facility  -           Vital Signs    Pre Systolic BP Rehab 185  -BH        Pre Treatment Diastolic BP 68  -BH        Post Systolic BP Rehab 165   RN notified  -        Post Treatment Diastolic BP 67  -BH        Pretreatment Heart Rate (beats/min) 76  -BH        Posttreatment Heart Rate (beats/min) 85  -BH        Pre SpO2 (%) 99  -BH        O2 Delivery Pre Treatment supplemental O2  -        Post SpO2 (%) 97  -BH        O2 Delivery Post Treatment supplemental O2  -        Pre Patient Position Supine  -BH        Intra Patient Position Standing  -BH        Post Patient Position Supine  -BH           Planned OT Interventions    Planned Therapy Interventions (OT Eval) activity tolerance training;adaptive equipment training;BADL retraining;cognitive/visual perception retraining;functional balance retraining;IADL retraining;occupation/activity based interventions;passive ROM/stretching;patient/caregiver education/training;ROM/therapeutic exercise;strengthening exercise;transfer/mobility retraining  -           OT Goals    Transfer Goal Selection (OT) transfer, OT goal 1  -        Bathing Goal Selection  (OT) bathing, OT goal 1  -        Dressing Goal Selection (OT) dressing, OT goal 1  -        Toileting Goal Selection (OT) toileting, OT goal 1  -        Grooming Goal Selection (OT) grooming, OT goal 1  -        Additional Documentation Grooming Goal Selection (OT) (Row)  -           Transfer Goal 1 (OT)    Activity/Assistive Device (Transfer Goal 1, OT) toilet  -        [place] Level/Cues Needed (Transfer Goal 1, OT) minimum assist (75% or more patient effort)  -        Time Frame (Transfer Goal 1, OT) long term goal (LTG);by discharge  -        Progress/Outcome (Transfer Goal 1, OT) goal not met  -           Bathing Goal 1 (OT)    Activity/Assistive Device (Bathing Goal 1, OT) bathing skills, all  -        [place] Level/Cues Needed (Bathing Goal 1, OT) minimum assist (75% or more patient effort)  -        Time Frame (Bathing Goal 1, OT) long term goal (LTG);by discharge  -        Progress/Outcomes (Bathing Goal 1, OT) goal not met  -           Dressing Goal 1 (OT)    Activity/Assistive Device (Dressing Goal 1, OT) dressing skills, all  -        [place]/Cues Needed (Dressing Goal 1, OT) minimum assist (75% or more patient effort)  -        Time Frame (Dressing Goal 1, OT) long term goal (LTG);by discharge  -        Progress/Outcome (Dressing Goal 1, OT) goal not met  -           Toileting Goal 1 (OT)    Activity/Device (Toileting Goal 1, OT) toileting skills, all  -        [place] Level/Cues Needed (Toileting Goal 1, OT) minimum assist (75% or more patient effort)  -        Time Frame (Toileting Goal 1, OT) long term goal (LTG);by discharge  -        Progress/Outcome (Toileting Goal 1, OT) goal not met  -           Grooming Goal 1 (OT)    Activity/Device (Grooming Goal 1, OT) grooming skills, all  -        [place] (Grooming Goal 1, OT) set-up required;standby assist;verbal cues required;tactile cues required  -        Time Frame (Grooming Goal  1, OT) long term goal (LTG);by discharge  -        Progress/Outcome (Grooming Goal 1, OT) goal not met  -           Living Environment    Home Accessibility wheelchair accessible;stairs within home;tub/shower is not walk in  [facility]          User Key  (r) = Recorded By, (t) = Taken By, (c) = Cosigned By    Initials Name Effective Dates     Liyah Mcnair CHU, OTR/L 06/08/18 -            Occupational Therapy Education     Title: PT OT SLP Therapies (Active)     Topic: Occupational Therapy (Active)     Point: ADL training (Active)     Description: Instruct learner(s) on proper safety adaptation and remediation techniques during self care or transfers.   Instruct in proper use of assistive devices.   Learning Progress Summary     Learner Status Readiness Method Response Comment Documented by    Patient Active Acceptance E NR Educated about OT and POC. Educated on safety throughout including hand placement for t/f. Educated on need for 24/7 care. Educated how to use the gait belt and when to family.  10/16/18 1105    Family Active Acceptance E NR Educated about OT and POC. Educated on safety throughout including hand placement for t/f. Educated on need for 24/7 care. Educated how to use the gait belt and when to family.  10/16/18 1105          Point: Precautions (Active)     Description: Instruct learner(s) on prescribed precautions during self-care and functional transfers.   Learning Progress Summary     Learner Status Readiness Method Response Comment Documented by    Patient Active Acceptance E NR Educated about OT and POC. Educated on safety throughout including hand placement for t/f. Educated on need for 24/7 care. Educated how to use the gait belt and when to family.  10/16/18 1105    Family Active Acceptance E NR Educated about OT and POC. Educated on safety throughout including hand placement for t/f. Educated on need for 24/7 care. Educated how to use the gait belt and when to family.  10/16/18 1105                       User Key     Initials Effective Dates Name Provider Type Discipline     06/08/18 -  Liyah Mcnair, OTR/L Occupational Therapist OT                  OT Recommendation and Plan  Outcome Summary/Treatment Plan (OT)  Anticipated Discharge Disposition (OT): home with 24/7 care, home with home health, skilled nursing facility  Planned Therapy Interventions (OT Eval): activity tolerance training, adaptive equipment training, BADL retraining, cognitive/visual perception retraining, functional balance retraining, IADL retraining, occupation/activity based interventions, passive ROM/stretching, patient/caregiver education/training, ROM/therapeutic exercise, strengthening exercise, transfer/mobility retraining  Therapy Frequency (OT Eval): other (see comments) (5-7 days a week)  Plan of Care Review  Plan of Care Reviewed With: patient, family  Plan of Care Reviewed With: patient, family  Outcome Summary: OT eval completed this date. Pt BP high and RN notified. Co-eval with PT. Pt displayed decreased sequencing, direction following, motor planning, problem solving, balance, strength, and endurance that decrease safety with ADL. Pt at times was max assist for transfer, standing balance and mobility with leaning backwards/posterior lean where assist not to fall straight back needed. Pt averaged mod assist for toileting. Pt displayed difficulty with self feeding with shaky BUE noted. Pt needs further skilled OT to reach maximum level of independence with ADL. Pt lives alone and is currently not safe to return without assist. OT recommends SNF for rehab to get stronger or home with 24/7 skilled assist and home health therapy.           Outcome Measures     Row Name 10/16/18 0841             How much help from another is currently needed...    Putting on and taking off regular lower body clothing? 2  -BH      Bathing (including washing, rinsing, and drying) 2  -BH      Toileting (which includes using toilet bed  pan or urinal) 2  -      Putting on and taking off regular upper body clothing 2  -      Taking care of personal grooming (such as brushing teeth) 3  -      Eating meals 3  -      Score 14  -         Functional Assessment    Outcome Measure Options AM-PAC 6 Clicks Daily Activity (OT)  -        User Key  (r) = Recorded By, (t) = Taken By, (c) = Cosigned By    Initials Name Provider Type     Liyah Mcnair OTR/L Occupational Therapist          Time Calculation:         Time Calculation- OT     Row Name 10/16/18 1110             Time Calculation- OT    OT Start Time 0841  -      OT Stop Time 0939  -      OT Time Calculation (min) 58 min  -      Total Timed Code Minutes- OT 15 minute(s)  -      OT Received On 10/16/18  -      OT Goal Re-Cert Due Date 10/29/18  -        User Key  (r) = Recorded By, (t) = Taken By, (c) = Cosigned By    Initials Name Provider Type     Liyah Mcnair OTR/L Occupational Therapist        Therapy Suggested Charges     Code   Minutes Charges    None           Therapy Charges for Today     Code Description Service Date Service Provider Modifiers Qty    [id number]  OT SELFCARE CURRENT 10/16/2018 Liyah Mcnair OTR/L GO, CL 1    [id number]  OT SELFCARE PROJECTED 10/16/2018 Liyah Mcnair OTR/L GO, CK 1    [id number]  OT SELF CARE/MGMT/TRAIN EA 15 MIN 10/16/2018 Liyah Mcnair OTR/L GO 1    [id number]  OT EVAL MOD COMPLEXITY 2 10/16/2018 Liyah Mcnair OTR/L GO 1          OT G-codes  OT Professional Judgement Used?: Yes  OT Functional Scales Options: AM-PAC 6 Clicks Daily Activity (OT)  Score: 14  Functional Limitation: Self care  Self Care Current Status (): At least 60 percent but less than 80 percent impaired, limited or restricted  Self Care Goal Status (): At least 40 percent but less than 60 percent impaired, limited or restricted    Liyah Mcnair OTR/MANJU  10/16/2018

## 2018-10-16 NOTE — PLAN OF CARE
Problem: Fall Risk (Adult)  Goal: Absence of Fall  Outcome: Ongoing (interventions implemented as appropriate)      Problem: Patient Care Overview  Goal: Plan of Care Review  Outcome: Ongoing (interventions implemented as appropriate)   10/15/18 9059   Coping/Psychosocial   Plan of Care Reviewed With patient;grandchild(larisa);caregiver   Plan of Care Review   Progress no change   OTHER   Outcome Summary Patient rsting in bed. VS stable. /62, hopsitalist on call paged and aware, no new orders. Patient c/o throat and tongue being dry, see new orders. No c/o pain or s/s of distress at this time. Caregiver to remain at bedside. Will continue to monitor.      Goal: Discharge Needs Assessment  Outcome: Ongoing (interventions implemented as appropriate)      Problem: Skin Injury Risk (Adult)  Goal: Skin Health and Integrity  Outcome: Ongoing (interventions implemented as appropriate)      Problem: Pneumonia (Adult)  Goal: Signs and Symptoms of Listed Potential Problems Will be Absent, Minimized or Managed (Pneumonia)  Outcome: Ongoing (interventions implemented as appropriate)      Problem: Breathing Pattern Ineffective (Adult)  Goal: Effective Oxygenation/Ventilation  Outcome: Ongoing (interventions implemented as appropriate)    Goal: Anxiety/Fear Reduction  Outcome: Ongoing (interventions implemented as appropriate)

## 2018-10-17 NOTE — PLAN OF CARE
Problem: Patient Care Overview  Goal: Plan of Care Review  Outcome: Ongoing (interventions implemented as appropriate)   10/17/18 3225   Coping/Psychosocial   Plan of Care Reviewed With patient;family   Plan of Care Review   Progress improving   OTHER   Outcome Summary Pt has fair appetite w/PO intakes 50-75% most meals. Continues to have sore throat, orders softer foods. Drinking Boost GC to optimize nutrition. Prune juice provided to promote BM.

## 2018-10-17 NOTE — THERAPY TREATMENT NOTE
Acute Care - Occupational Therapy Treatment Note  [facility]     Patient Name: Mariaa Pires  : 4/10/1926  MRN: [id number]  Today's Date: 10/17/2018  Onset of Illness/Injury or Date of Surgery: 10/13/18  Date of Referral to OT: 10/15/18  Referring Physician: Dr. North    Admit Date: 10/13/2018       ICD-10-CM ICD-9-CM   1. Acute on chronic respiratory failure with hypoxia (CMS/[facility]) J96.21 518.84     799.02   2. Pneumonia due to infectious organism, unspecified laterality, unspecified part of lung J18.9 136.9     484.8   3. NSTEMI (non-ST elevated myocardial infarction) (CMS/[facility]) I21.4 410.70   4. Oropharyngeal dysphagia R13.12 787.22   5. Impaired mobility and ADLs Z74.09 799.89   6. Impaired functional mobility, balance, gait, and endurance Z74.09 V49.89     Patient Active Problem List   Diagnosis   • Pneumonia   • Bilateral pleural effusion   • Osteoporosis   • Acute respiratory failure with hypoxia and hypercapnia (CMS/[facility])   • Diabetes mellitus (CMS/[facility])   • Hypertension   • Pulmonary hypertension (CMS/[facility])   • Aortic valve stenosis     Past Medical History:   Diagnosis Date   • Diabetes mellitus (CMS/HCC) 10/14/2018   • Hypertension    • Osteoporosis 10/14/2018     History reviewed. No pertinent surgical history.    Therapy Treatment          Rehabilitation Treatment Summary     Row Name 10/17/18 1345 10/17/18 1105          Treatment Time/Intention    Discipline physical therapy assistant  -SHAUNA occupational therapy assistant  -LW     Document Type therapy note (daily note)  -SHAUNA therapy note (daily note)  -     Subjective Information no complaints  -SHAUNA no complaints  -LW     Mode of Treatment physical therapy  -SHAUNA occupational therapy  -LW     Patient/Family Observations Pt. sitting up in recliner with no family present   -SHAUNA Pt sitting on toilet with CNA close by  -LW     Care Plan Review  -- care plan/treatment goals reviewed  -LW     Care Plan Review, Other Participant(s)  -- son  -LW      Therapy Frequency (PT Clinical Impression) other (see comments)   5-14x/week  -JA  --     Total Minutes, Occupational Therapy Treatment  -- 45  -LW     Therapy Frequency (OT Eval)  -- other (see comments)   5-7 days per week  -LW     Patient Effort good  -JA good  -LW     Existing Precautions/Restrictions fall;oxygen therapy device and L/min  -JA fall;oxygen therapy device and L/min  -LW     Equipment Issued to Patient  -- gait belt  -LW     Recorded by [JA] Isac Thacker, PTA 10/17/18 1452 [LW] Alexa Desir MONSIVAIS/L 10/17/18 1512     Row Name 10/17/18 1345 10/17/18 1105          Vital Signs    Pre Systolic BP Rehab 150  -  -LW     Pre Treatment Diastolic BP 67  -JA 58  -LW     Pretreatment Heart Rate (beats/min) 78  -JA 81  -LW     Posttreatment Heart Rate (beats/min) 88  -JA 82  -LW     Pre SpO2 (%) 95  -JA 96  -LW     O2 Delivery Pre Treatment supplemental O2  -JA supplemental O2  -LW     Post SpO2 (%) 94  -JA 97  -LW     O2 Delivery Post Treatment supplemental O2  -JA supplemental O2  -LW     Pre Patient Position  -- Sitting  -LW     Post Patient Position  -- Sitting  -LW     Recorded by [JA] Isac Thacker PTA 10/17/18 1452 [LW] Alexa Desir MONSIVAIS/L 10/17/18 1512     Row Name 10/17/18 1345 10/17/18 1105          Cognitive Assessment/Intervention- PT/OT    Affect/Mental Status (Cognitive)  -- WFL  -LW     Orientation Status (Cognition) oriented to;person  -JA oriented to;person  -LW     Follows Commands (Cognition) follows one step commands;75-90% accuracy;verbal cues/prompting required;physical/tactile prompts required  -JA follows multi-step commands  -LW     Safety Deficit (Cognitive)  -- mild deficit  -LW     Personal Safety Interventions  -- gait belt;fall prevention program maintained;nonskid shoes/slippers when out of bed  -LW     Recorded by [JA] Isac Thacker, PTA 10/17/18 1452 [LW] Alexa Desir MONSIVAIS/L 10/17/18 1512     Row Name 10/17/18 1100             Safety  Issues, Functional Mobility    Impairments Affecting Function (Mobility) balance;coordination;endurance/activity tolerance  -LW      Recorded by [LW] Alexa Desir COTA/L 10/17/18 1512      Row Name 10/17/18 1105             Functional Mobility    Functional Mobility- Ind. Level minimum assist (75% patient effort)  -LW      Functional Mobility- Device rolling walker  -LW      Functional Mobility-Distance (Feet) 6  -LW      Functional Mobility- Comment Pt has posterior lean  -LW      Recorded by [LW] Alexa Desir COTA/L 10/17/18 1512      Row Name 10/17/18 1345 10/17/18 1105          Transfer Assessment/Treatment    Transfer Assessment/Treatment sit-stand transfer;stand-sit transfer  -SHAUNA sit-stand transfer;stand-sit transfer;toilet transfer  -LW     Comment (Transfers) Pt. requires v.c.'s for proper AD placement during stand-sit transfers pt. pushes RW out of the way and attempts to sit in chair   -JA2  --     Recorded by [JA] Isac Thacker, PTA 10/17/18 1452  [JA2] Isac Thacker, PTA 10/17/18 1456 [LW] Alexa Desir COTA/L 10/17/18 1512     Row Name 10/17/18 1345 10/17/18 1105          Sit-Stand Transfer    Sit-Stand [place] (Transfers) minimum assist (75% patient effort);verbal cues;nonverbal cues (demo/gesture)  -SHAUNA minimum assist (75% patient effort)  -LW     Assistive Device (Sit-Stand Transfers) walker, front-wheeled  -SHAUNA walker, front-wheeled  -LW     Recorded by [JA] Isac Thacker, PTA 10/17/18 1452 [LW] Alexa Desir COTA/L 10/17/18 1512     Row Name 10/17/18 1345 10/17/18 1105          Stand-Sit Transfer    Stand-Sit [place] (Transfers) minimum assist (75% patient effort);verbal cues;nonverbal cues (demo/gesture)   to sit in correct spot, cues to wait until safe to sit  -SHAUNA minimum assist (75% patient effort)  -LW     Assistive Device (Stand-Sit Transfers) walker, front-wheeled  -SHAUNA walker, front-wheeled  -LW     Recorded by [JA] Isac Thacker, PTA 10/17/18  1452 [LW] Alexa Dseir COTA/L 10/17/18 1512     Row Name 10/17/18 1345 10/17/18 1105          Toilet Transfer    Type (Toilet Transfer) sit-stand;stand-sit  -JA sit-stand;stand-sit  -LW     [place] Level (Toilet Transfer) minimum assist (75% patient effort)  -JA minimum assist (75% patient effort)  -LW     Assistive Device (Toilet Transfer) walker, front-wheeled  -JA commode;grab bars/safety frame  -LW     Recorded by [JA] Isac Thacker, PTA 10/17/18 1452 [LW] Alexa Desir MONSIVAIS/L 10/17/18 1512     Row Name 10/17/18 1345             Gait/Stairs Assessment/Training    Gait/Stairs Assessment/Training gait/ambulation independence;gait/ambulation assistive device;distance ambulated  -JA      [place] Level (Gait) contact guard;other (see comments)   + 1 for transport to follow for safety   -JA2      Assistive Device (Gait) walker, front-wheeled  -SHAUNA      Distance in Feet (Gait) 15x2, 150  -JA      Pattern (Gait) step-through  -JA      Deviations/Abnormal Patterns (Gait) gait speed decreased;stride length decreased  -JA      Recorded by [JA] Isac Thacker, PTA 10/17/18 1452  [JA2] Isac Thacker, PTA 10/17/18 1456      Row Name 10/17/18 1105             ADL Assessment/Intervention    BADL Assessment/Intervention bathing;upper body dressing;lower body dressing;grooming;toileting  -LW      Recorded by [LW] Alexa Desir COTA/L 10/17/18 1512      Row Name 10/17/18 1105             Bathing Assessment/Intervention    Bathing [place] Level bathing skills;lower body;upper body;contact guard assist  -LW      Assistive Devices (Bathing) other (see comments)   wash cloth Pan bath  -LW      Bathing Position supported sitting  -LW      Recorded by [LW] Alexa Desir COTA/L 10/17/18 1512      Row Name 10/17/18 1105             Upper Body Dressing Assessment/Training    Upper Body Dressing [place] Level upper body dressing skills;doff;don;other (see  comments);bra/undergarment;contact guard assist   Hospital gown  -LW      Upper Body Dressing Position supported sitting  -LW      Recorded by [LW] Alexa Desir COTA/L 10/17/18 1512      Row Name 10/17/18 1105             Lower Body Dressing Assessment/Training    Lower Body Dressing [place] Level doff;don;undergarment;socks;minimum assist (75% patient effort)  -LW      Lower Body Dressing Position supported sitting  -LW      Recorded by [LW] Alexa Desir COTA/L 10/17/18 1512      Row Name 10/17/18 1105             Grooming Assessment/Training    [place] Level (Grooming) grooming skills;hair care, combing/brushing;oral care regimen;wash face, hands;supervision;set up  -LW      Grooming Position supported sitting  -LW      Recorded by [LW] Alexa Desir COTA/L 10/17/18 1512      Row Name 10/17/18 1105             Toileting Assessment/Training    [place] Level (Toileting) toileting skills;contact guard assist  -LW      Assistive Devices (Toileting) commode;grab bar/safety frame  -LW      Recorded by [LW] Alexa Desir COTA/L 10/17/18 1512      Row Name 10/17/18 1345 10/17/18 1105          Positioning and Restraints    Pre-Treatment Position sitting in chair/recliner  -JA bathroom  -LW     Post Treatment Position chair  -JA chair  -LW     In Chair sitting;reclined;call light within reach;encouraged to call for assist;with family/caregiver  - sitting;call light within reach;encouraged to call for assist;exit alarm on;with family/caregiver  -LW     Recorded by [JA] Isac Thacker, PTA 10/17/18 1452 [LW] Alexa Desir MONSIVAIS/L 10/17/18 1512     Row Name 10/17/18 1345 10/17/18 1105          Pain Scale: Numbers Pre/Post-Treatment    Pain Scale: Numbers, Pretreatment 0/10 - no pain  -JA 0/10 - no pain  -LW     Pain Scale: Numbers, Post-Treatment 0/10 - no pain  -JA 0/10 - no pain  -LW     Recorded by [JA] Isac Thacker, PTA 10/17/18 1452 [LW] Alexa Desir COTA/MANJU 10/17/18  1512     Row Name 10/17/18 1345 10/17/18 1105          Sensory Assessment/Intervention    Sensory General Assessment --  -JA no sensation deficits identified  -LW     Recorded by [JA] Isac Thacker, PTA 10/17/18 1452 [LW] Alexa Desir MONSIVAIS/L 10/17/18 1512     Row Name 10/17/18 1345 10/17/18 1105          Hearing Assessment    Hearing Status --  -SHAUNA hearing impairment, bilaterally  -LW     Recorded by [JA] Isac Thacker, PTA 10/17/18 1452 [LW] Alexa Desir MONSIVAIS/L 10/17/18 1512     Row Name 10/17/18 1345 10/17/18 1105          Vision Assessment/Intervention    Visual Impairment/Limitations --  -SHAUNA corrective lenses full time  -LW     Recorded by [JA] Isac Thacker, PTA 10/17/18 1452 [LW] Alexa Desir MONSIVAIS/L 10/17/18 1512     Row Name 10/17/18 1105             Light Touch Sensation Assessment    Left Upper Extremity: Light Touch Sensation Assessment intact  -LW      Right Upper Extremity: Light Touch Sensation Assessment mild impairment, 75% or more correct responses  -LW      Recorded by [LW] Alexa Desir MONSIVAIS/L 10/17/18 1512      Row Name 10/17/18 1345 10/17/18 1105          Coping    Observed Emotional State cooperative;pleasant  -SHAUNA accepting;calm;cooperative  -LW     Verbalized Emotional State  -- acceptance  -LW     Recorded by [JA] Isac Thacker, PTA 10/17/18 1452 [LW] Alexa Desir MONSIVAIS/L 10/17/18 1512     Row Name 10/17/18 1105             Plan of Care Review    Plan of Care Reviewed With patient  -LW      Recorded by [LW] Alexa Desir MONSIVAIS/L 10/17/18 1512      Row Name 10/17/18 1105             Outcome Summary/Treatment Plan (OT)    Daily Summary of Progress (OT) progress toward functional goals is good  -LW      Plan for Continued Treatment (OT) Continue POC  -LW      Anticipated Discharge Disposition (OT) home with home health;home with assist;skilled nursing facility  -LW      Recorded by [LW] Alexa Desir MONSIVAIS/L 10/17/18 1512      Row Name 10/17/18  1345             Outcome Summary/Treatment Plan (PT)    Daily Summary of Progress (PT) progress toward functional goals as expected  -SHAUNA      Plan for Continued Treatment (PT) Cont. gt, therex, transfers   -SHAUNA      Anticipated Discharge Disposition (PT) home with 24/7 care;skilled nursing facility  -SHAUNA      Recorded by [SHAUNA] Isac Thacker, PTA 10/17/18 8742        User Key  (r) = Recorded By, (t) = Taken By, (c) = Cosigned By    Initials Name Effective Dates Discipline    Isac Coles, PTA 03/07/18 -  PT    LW Alexa Desir MONSIVAIS/L 03/07/18 -  OT                   OT Rehab Goals     Row Name 10/17/18 1105             Transfer Goal 1 (OT)    Activity/Assistive Device (Transfer Goal 1, OT) toilet  -LW      [place] Level/Cues Needed (Transfer Goal 1, OT) minimum assist (75% or more patient effort)  -LW      Time Frame (Transfer Goal 1, OT) long term goal (LTG);by discharge  -LW      Progress/Outcome (Transfer Goal 1, OT) goal not met  -LW         Bathing Goal 1 (OT)    Activity/Assistive Device (Bathing Goal 1, OT) bathing skills, all  -LW      [place] Level/Cues Needed (Bathing Goal 1, OT) minimum assist (75% or more patient effort)  -LW      Time Frame (Bathing Goal 1, OT) long term goal (LTG);by discharge  -LW      Progress/Outcomes (Bathing Goal 1, OT) goal not met  -LW         Dressing Goal 1 (OT)    Activity/Assistive Device (Dressing Goal 1, OT) dressing skills, all  -LW      [place]/Cues Needed (Dressing Goal 1, OT) minimum assist (75% or more patient effort)  -LW      Time Frame (Dressing Goal 1, OT) long term goal (LTG);by discharge  -LW      Progress/Outcome (Dressing Goal 1, OT) goal not met  -LW         Toileting Goal 1 (OT)    Activity/Device (Toileting Goal 1, OT) toileting skills, all  -LW      [place] Level/Cues Needed (Toileting Goal 1, OT) minimum assist (75% or more patient effort)  -LW      Time Frame (Toileting Goal 1, OT) long term goal (LTG);by discharge   -LW      Progress/Outcome (Toileting Goal 1, OT) goal not met  -LW         Grooming Goal 1 (OT)    Activity/Device (Grooming Goal 1, OT) grooming skills, all  -LW      [place] (Grooming Goal 1, OT) set-up required;standby assist;verbal cues required;tactile cues required  -LW      Time Frame (Grooming Goal 1, OT) long term goal (LTG);by discharge  -LW      Progress/Outcome (Grooming Goal 1, OT) goal not met  -LW        User Key  (r) = Recorded By, (t) = Taken By, (c) = Cosigned By    Initials Name Provider Type Discipline    LW Alexa Desir COTA/L Occupational Therapy Assistant OT        Occupational Therapy Education     Title: PT OT SLP Therapies (Active)     Topic: Occupational Therapy (Active)     Point: ADL training (Active)     Description: Instruct learner(s) on proper safety adaptation and remediation techniques during self care or transfers.   Instruct in proper use of assistive devices.   Learning Progress Summary     Learner Status Readiness Method Response Comment Documented by    Patient Done Acceptance E,TB VU Educated pt on fall precautions  10/17/18 1514     Done Acceptance E,TB VU   10/16/18 1208     Active Acceptance E NR Educated about OT and POC. Educated on safety throughout including hand placement for t/f. Educated on need for 24/7 care. Educated how to use the gait belt and when to family.  10/16/18 1105    Family Active Acceptance E NR Educated about OT and POC. Educated on safety throughout including hand placement for t/f. Educated on need for 24/7 care. Educated how to use the gait belt and when to family.  10/16/18 1105          Point: Home exercise program (Done)     Description: Instruct learner(s) on appropriate technique for monitoring, assisting and/or progressing therapeutic exercises/activities.   Learning Progress Summary     Learner Status Readiness Method Response Comment Documented by    Patient Done Acceptance E,TB VU Educated pt on fall precautions   10/17/18 1514     Done Acceptance E,TB VU  LW 10/16/18 1208          Point: Precautions (Active)     Description: Instruct learner(s) on prescribed precautions during self-care and functional transfers.   Learning Progress Summary     Learner Status Readiness Method Response Comment Documented by    Patient Done Acceptance E,TB VU Educated pt on fall precautions LW 10/17/18 1514     Done Acceptance E,TB VU  LW 10/16/18 1208     Active Acceptance E NR Educated about OT and POC. Educated on safety throughout including hand placement for t/f. Educated on need for 24/7 care. Educated how to use the gait belt and when to family.  10/16/18 1105    Family Active Acceptance E NR Educated about OT and POC. Educated on safety throughout including hand placement for t/f. Educated on need for 24/7 care. Educated how to use the gait belt and when to family.  10/16/18 1105          Point: Body mechanics (Done)     Description: Instruct learner(s) on proper positioning and spine alignment during self-care, functional mobility activities and/or exercises.   Learning Progress Summary     Learner Status Readiness Method Response Comment Documented by    Patient Done Acceptance E,TB VU Educated pt on fall precautions LW 10/17/18 1514     Done Acceptance E,TB VU  LW 10/16/18 1208                      User Key     Initials Effective Dates Name Provider Type Discipline     06/08/18 -  Liyah Mcnair, OTR/L Occupational Therapist OT     03/07/18 -  Alexa Desir MONSIVAIS/L Occupational Therapy Assistant OT                OT Recommendation and Plan  Outcome Summary/Treatment Plan (OT)  Daily Summary of Progress (OT): progress toward functional goals is good  Plan for Continued Treatment (OT): Continue POC  Anticipated Discharge Disposition (OT): home with home health, home with assist, skilled nursing facility  Therapy Frequency (OT Eval): other (see comments) (5-7 days per week)  Daily Summary of Progress (OT): progress toward  functional goals is good  Plan of Care Review  Plan of Care Reviewed With: patient  Plan of Care Reviewed With: patient  Outcome Summary: Pt performed bathing with CGA Dressing UB with CGA Lower body with Min A. Grooming with supervision and set up. Pt could benefit from OT HH upon D/C        Outcome Measures     Row Name 10/17/18 1345 10/17/18 1105 10/16/18 1000       How much help from another person do you currently need...    Turning from your back to your side while in flat bed without using bedrails? 3  -JA  --  --    Moving from lying on back to sitting on the side of a flat bed without bedrails? 3  -JA  --  --    Moving to and from a bed to a chair (including a wheelchair)? 3  -JA  --  --    Standing up from a chair using your arms (e.g., wheelchair, bedside chair)? 3  -JA  --  --    Climbing 3-5 steps with a railing? 2  -JA  --  --    To walk in hospital room? 3  -JA  --  --    AM-PAC 6 Clicks Score 17  -JA  --  --       How much help from another is currently needed...    Putting on and taking off regular lower body clothing?  -- 2  -LW 2  -LW    Bathing (including washing, rinsing, and drying)  -- 2  -LW 2  -LW    Toileting (which includes using toilet bed pan or urinal)  -- 2  -LW 2  -LW    Putting on and taking off regular upper body clothing  -- 2  -LW 2  -LW    Taking care of personal grooming (such as brushing teeth)  -- 3  -LW 3  -LW    Eating meals  -- 3  -LW 3  -LW    Score  -- 14  -LW 14  -LW       Functional Assessment    Outcome Measure Options AM-PAC 6 Clicks Basic Mobility (PT)  -JA  --  --    Row Name 10/16/18 0842 10/16/18 0841          How much help from another person do you currently need...    Turning from your back to your side while in flat bed without using bedrails? 3  -CW  --     Moving from lying on back to sitting on the side of a flat bed without bedrails? 3  -CW  --     Moving to and from a bed to a chair (including a wheelchair)? 2  -CW  --     Standing up from a chair using  your arms (e.g., wheelchair, bedside chair)? 2  -CW  --     Climbing 3-5 steps with a railing? 1  -CW  --     To walk in hospital room? 2  -CW  --     AM-PAC 6 Clicks Score 13  -CW  --        How much help from another is currently needed...    Putting on and taking off regular lower body clothing?  -- 2  -BH     Bathing (including washing, rinsing, and drying)  -- 2  -BH     Toileting (which includes using toilet bed pan or urinal)  -- 2  -BH     Putting on and taking off regular upper body clothing  -- 2  -BH     Taking care of personal grooming (such as brushing teeth)  -- 3  -BH     Eating meals  -- 3  -BH     Score  -- 14  -BH        Functional Assessment    Outcome Measure Options AM-PAC 6 Clicks Basic Mobility (PT)  -CW AM-PAC 6 Clicks Daily Activity (OT)  -       User Key  (r) = Recorded By, (t) = Taken By, (c) = Cosigned By    Initials Name Provider Type    Liyah Muñoz, OTR/L Occupational Therapist    Isac Coles PTA Physical Therapy Assistant    Alexa Baumann COTA/L Occupational Therapy Assistant    CW Candida Biggs, PT Physical Therapist           Time Calculation:         Time Calculation- OT     Row Name 10/17/18 1516 10/17/18 1456          Time Calculation- OT    OT Start Time 1105  -LW  --     OT Stop Time 1150  -LW  --     OT Time Calculation (min) 45 min  -LW  --     Total Timed Code Minutes- OT 45 minute(s)  -LW  --     OT Received On 10/17/18  -LW  --        Timed Charges    95824 - Gait Training Minutes   -- 20  -JA       User Key  (r) = Recorded By, (t) = Taken By, (c) = Cosigned By    Initials Name Provider Type    Isac Coles PTA Physical Therapy Assistant    Alexa Baumann COTA/L Occupational Therapy Assistant           Therapy Suggested Charges     Code   Minutes Charges    None           Therapy Charges for Today     Code Description Service Date Service Provider Modifiers Qty    68582139958  OT SELF CARE/MGMT/TRAIN EA 15 MIN 10/16/2018  Alexa Desir COTA/L GO 2    97126887339 HC OT THER PROC EA 15 MIN 10/16/2018 Alexa Desir COTA/L GO 1    [id number] HC OT SELF CARE/MGMT/TRAIN EA 15 MIN 10/17/2018 Alexa Desir COTA/L GO 3      Non-skid socks and gait belt in place. Toileting offered. Call light and needs within reach. Pt advised to not get up alone and call the nurse for assistance.  Chair alarm on.     OT G-codes  OT Professional Judgement Used?: Yes  OT Functional Scales Options: AM-PAC 6 Clicks Daily Activity (OT)  Score: 14  Functional Limitation: Self care  Self Care Current Status (): At least 60 percent but less than 80 percent impaired, limited or restricted  Self Care Goal Status (): At least 40 percent but less than 60 percent impaired, limited or restricted    ROEL Suárez  10/17/2018

## 2018-10-17 NOTE — PLAN OF CARE
Problem: Patient Care Overview  Goal: Plan of Care Review  Outcome: Ongoing (interventions implemented as appropriate)   10/17/18 1510   Coping/Psychosocial   Plan of Care Reviewed With patient   Plan of Care Review   Progress no change   OTHER   Outcome Summary Pt performed bathing with CGA Dressing UB with CGA Lower body with Min A. Grooming with supervision and set up. Pt could benefit from OT HH upon D/C

## 2018-10-17 NOTE — PLAN OF CARE
Problem: Fall Risk (Adult)  Goal: Absence of Fall  Outcome: Ongoing (interventions implemented as appropriate)      Problem: Patient Care Overview  Goal: Plan of Care Review  Outcome: Ongoing (interventions implemented as appropriate)   10/16/18 0250   Plan of Care Review   Progress declining   OTHER   Outcome Summary Patient resting in bed, caregiver to remain at bedside. VS stable. Patient had an episode of chest pain with hospitalist on call page and came to bedside. See other nursing note. Chest pain has subsided at this time. No s/s of distress at this time. Will continue to monitor this patient.      Goal: Discharge Needs Assessment  Outcome: Ongoing (interventions implemented as appropriate)      Problem: Skin Injury Risk (Adult)  Goal: Skin Health and Integrity  Outcome: Ongoing (interventions implemented as appropriate)      Problem: Pneumonia (Adult)  Goal: Signs and Symptoms of Listed Potential Problems Will be Absent, Minimized or Managed (Pneumonia)  Outcome: Ongoing (interventions implemented as appropriate)      Problem: Breathing Pattern Ineffective (Adult)  Goal: Effective Oxygenation/Ventilation  Outcome: Ongoing (interventions implemented as appropriate)    Goal: Anxiety/Fear Reduction  Outcome: Ongoing (interventions implemented as appropriate)

## 2018-10-17 NOTE — PROGRESS NOTES
Nurse called me as patient had some chest pain but by the time I got there it was already subsided on its own.  EKG done revealed patient is in A. fib with controlled rate of around 90, with ST-T changes in anteroseptal leads suggestive of possible acute MI.  Prior EKG suggestive of pacemaker rhythm.  Could not look any other tracings prior.  But patient does have known history of A. fib in the past.  Also reviewed echocardiogram done by Dr. Lyon with significant abnormalities.  Patient also now has been seen by palliative care and family is thinking about proceeding with palliative care.  Also patient had 8-10 falls in past 1 month.  Considering all about details IV ordered nitroglycerin when necessary.  Already ordered troponin and CK and CK-MB's THAT order but would not need any further treatment plan.  Patient is also not a candidate for anticoagulation due to multiple comorbidities, frequent falls and very high risk of bleeding.  Patient also wants conservative treatment.  Discussed with patient and family member at bedside.  Spoke with nursing staff.

## 2018-10-17 NOTE — PLAN OF CARE
Problem: Fall Risk (Adult)  Goal: Absence of Fall  Outcome: Ongoing (interventions implemented as appropriate)      Problem: Patient Care Overview  Goal: Plan of Care Review  Outcome: Ongoing (interventions implemented as appropriate)   10/17/18 1456   Coping/Psychosocial   Plan of Care Reviewed With patient   Plan of Care Review   Progress no change   OTHER   Outcome Summary Pt resting at this time; VSS at this time; all needs met. Will continue to monitor.     Goal: Individualization and Mutuality  Outcome: Ongoing (interventions implemented as appropriate)    Goal: Discharge Needs Assessment  Outcome: Ongoing (interventions implemented as appropriate)    Goal: Interprofessional Rounds/Family Conf  Outcome: Ongoing (interventions implemented as appropriate)      Problem: Skin Injury Risk (Adult)  Goal: Skin Health and Integrity  Outcome: Ongoing (interventions implemented as appropriate)      Problem: Pneumonia (Adult)  Goal: Signs and Symptoms of Listed Potential Problems Will be Absent, Minimized or Managed (Pneumonia)  Outcome: Ongoing (interventions implemented as appropriate)      Problem: Breathing Pattern Ineffective (Adult)  Goal: Effective Oxygenation/Ventilation  Outcome: Ongoing (interventions implemented as appropriate)    Goal: Anxiety/Fear Reduction  Outcome: Ongoing (interventions implemented as appropriate)

## 2018-10-17 NOTE — PROGRESS NOTES
[facility] Medicine Services  INPATIENT PROGRESS NOTE    Length of Stay: 3  Date of Admission: 10/13/2018  Primary Care Physician: Breezy Mcfarland MD    Subjective   Chief Complaint:  Shortness of breath  HPI:  Patient states that her shortness of breath is about the same as yesterday.  She states that she is no worse than yesterday, but doesn't feel any better.    Review of Systems   Constitutional: Positive for fatigue. Negative for appetite change, chills, fever and unexpected weight change.   Respiratory: Positive for shortness of breath. Negative for cough, choking, chest tightness and wheezing.    Cardiovascular: Negative for chest pain, palpitations and leg swelling.   Gastrointestinal: Negative for abdominal pain, blood in stool, constipation, diarrhea, nausea and vomiting.   Genitourinary: Negative for dysuria, flank pain and hematuria.   Neurological: Positive for weakness. Negative for dizziness, seizures, syncope, speech difficulty, light-headedness, numbness and headaches.   Hematological: Does not bruise/bleed easily.   Psychiatric/Behavioral: Positive for confusion.        All pertinent negatives and positives are as above. All other systems have been reviewed and are negative unless otherwise stated.     Objective    Temp:  [96 °F (35.6 °C)-98.1 °F (36.7 °C)] 96 °F (35.6 °C)  Heart Rate:  [70-95] 78  Resp:  [18-20] 18  BP: (127-170)/(58-72) 127/58    Physical Exam   Constitutional: She appears well-developed and well-nourished.   HENT:   Head: Normocephalic and atraumatic.   Eyes: Pupils are equal, round, and reactive to light. EOM are normal.   Neck: Normal range of motion. Neck supple.   Cardiovascular: Normal rate.  An irregularly irregular rhythm present. Exam reveals no gallop and no friction rub.    Murmur heard.   Systolic murmur is present with a grade of 2/6   Pulmonary/Chest: Effort normal and breath sounds normal. No respiratory distress. She has  no wheezes. She has no rales. She exhibits no tenderness.   Abdominal: Soft. Bowel sounds are normal. She exhibits no distension. There is no tenderness. There is no guarding.   Musculoskeletal: She exhibits edema.   Skin: Skin is warm and dry.   Psychiatric: She has a normal mood and affect. Her behavior is normal. Thought content normal.   Vitals reviewed.          Results Review:  I have reviewed the labs, radiology results, and diagnostic studies.    Laboratory Data:     Results from last 7 days  Lab Units 10/16/18  2239 10/14/18  0630 10/13/18  2309   SODIUM mmol/L 137 136* 132*   POTASSIUM mmol/L 3.5 4.1 5.0   CHLORIDE mmol/L 97 97 94*   CO2 mmol/L 35.0* 29.0 26.0   BUN mg/dL 20 28* 31*   CREATININE mg/dL 0.91 0.96 1.08*   GLUCOSE mg/dL 172* 138* 213*   CALCIUM mg/dL 8.8 9.3 9.4   BILIRUBIN mg/dL  --  0.7 0.8   ALK PHOS U/L  --  154* 166*   ALT (SGPT) U/L  --  60* 68*   AST (SGOT) U/L  --  51* 56*   ANION GAP mmol/L 5.0 10.0 12.0     Estimated Creatinine Clearance: 31.1 mL/min (by C-G formula based on SCr of 0.91 mg/dL).    Results from last 7 days  Lab Units 10/16/18  2239   MAGNESIUM mg/dL 1.5*           Results from last 7 days  Lab Units 10/14/18  0630 10/13/18  2309   WBC 10*3/mm3 9.05 11.37*   HEMOGLOBIN g/dL 12.0 12.6   HEMATOCRIT % 37.1 38.3   PLATELETS 10*3/mm3 286 333       Results from last 7 days  Lab Units 10/13/18  2309   INR  1.04       Culture Data:   No results found for: BLOODCX  No results found for: URINECX  No results found for: RESPCX  No results found for: WOUNDCX  No results found for: STOOLCX  No components found for: BODYFLD    Radiology Data:   Imaging Results (last 24 hours)     ** No results found for the last 24 hours. **          I have reviewed the patient's current medications.     Assessment/Plan     Active Hospital Problems    Diagnosis   • Pulmonary hypertension (CMS/HCC)   • Aortic valve stenosis   • Bilateral pleural effusion   • Osteoporosis   • Acute respiratory failure  with hypoxia and hypercapnia (CMS/HCC)   • Diabetes mellitus (CMS/HCC)   • Hypertension       Plan:    1.  Severe pulmonary hypertension:  Discussed with patient and family the likelihood that symptom mitigation would be our best and only option.  She sees Dr. Valadez routinely.  Recently Dr. Valadez saw her and recommended no change.  Current echo shows severe tricuspid regurgitation.  Likely this is the cause of her PH.  She is not a candidate for invasive testing or therapy.  Continue current treatment for now.  2.  Acute respiratory failure with hypoxia and hypercapnia:  Continue current treatment.  Symptomatically better.  3.  Systemic hypertension  4.  DM   5.  Atrial fibrillation:  Per Dr Valadez's notes, she has a history of atrial fibrillation.  Patient and her son both state that they are unaware of this as a previous diagnosis.  At this point, she is not overtly symptomatic so I recommend no increase in treatment.  I discussed this with patient and family and they agree.    Patient was seen by palliative care yesterday.  They are receptive to those services.  Will continue current course for now.        Discharge Planning: I expect patient to be discharged to SNF in 3-4 days.        This document has been electronically signed by Galen Blunt MD on October 17, 2018 11:43 AM

## 2018-10-17 NOTE — THERAPY TREATMENT NOTE
Acute Care - Physical Therapy Treatment Note  [facility]     Patient Name: Mariaa Pires  : 4/10/1926  MRN: [id number]  Today's Date: 10/17/2018  Onset of Illness/Injury or Date of Surgery: 10/13/18  Date of Referral to PT: 10/15/18  Referring Physician: Dr. North    Admit Date: 10/13/2018    Visit Dx:    ICD-10-CM ICD-9-CM   1. Acute on chronic respiratory failure with hypoxia (CMS/HCC) J96.21 518.84     799.02   2. Pneumonia due to infectious organism, unspecified laterality, unspecified part of lung J18.9 136.9     484.8   3. NSTEMI (non-ST elevated myocardial infarction) (CMS/[facility]) I21.4 410.70   4. Oropharyngeal dysphagia R13.12 787.22   5. Impaired mobility and ADLs Z74.09 799.89   6. Impaired functional mobility, balance, gait, and endurance Z74.09 V49.89     Patient Active Problem List   Diagnosis   • Pneumonia   • Bilateral pleural effusion   • Osteoporosis   • Acute respiratory failure with hypoxia and hypercapnia (CMS/HCC)   • Diabetes mellitus (CMS/[facility])   • Hypertension   • Pulmonary hypertension (CMS/[facility])   • Aortic valve stenosis       Therapy Treatment          Rehabilitation Treatment Summary     Row Name 10/17/18 1345             Treatment Time/Intention    Discipline physical therapy assistant  -JA      Document Type therapy note (daily note)  -JA      Subjective Information no complaints  -JA      Mode of Treatment physical therapy  -JA      Patient/Family Observations Pt. sitting up in recliner with no family present   -JA      Therapy Frequency (PT Clinical Impression) other (see comments)   5-14x/week  -JA      Patient Effort good  -JA      Existing Precautions/Restrictions fall;oxygen therapy device and L/min  -JA      Recorded by [JA] Isac Thacker, PTA 10/17/18 1452      Row Name 10/17/18 1345             Vital Signs    Pre Systolic BP Rehab 150  -JA      Pre Treatment Diastolic BP 67  -JA      Pretreatment Heart Rate (beats/min) 78  -JA      Posttreatment Heart Rate  (beats/min) 88  -JA      Pre SpO2 (%) 95  -JA      O2 Delivery Pre Treatment supplemental O2  -JA      Post SpO2 (%) 94  -JA      O2 Delivery Post Treatment supplemental O2  -JA      Recorded by [JA] Isac Thacker, PTA 10/17/18 1452      Row Name 10/17/18 1341             Cognitive Assessment/Intervention- PT/OT    Orientation Status (Cognition) oriented to;person  -JA      Follows Commands (Cognition) follows one step commands;75-90% accuracy;verbal cues/prompting required;physical/tactile prompts required  -JA      Recorded by [JA] Isac Thacker, PTA 10/17/18 1452      Row Name 10/17/18 1340             Transfer Assessment/Treatment    Transfer Assessment/Treatment sit-stand transfer;stand-sit transfer  -JA      Comment (Transfers) Pt. requires v.c.'s for proper AD placement during stand-sit transfers pt. pushes RW out of the way and attempts to sit in chair   -JA2      Recorded by [JA] Isac Thacker, PTA 10/17/18 1452  [JA2] Isac Thacker, PTA 10/17/18 1456      Row Name 10/17/18 1342             Sit-Stand Transfer    Sit-Stand [place] (Transfers) minimum assist (75% patient effort);verbal cues;nonverbal cues (demo/gesture)  -SHAUNA      Assistive Device (Sit-Stand Transfers) walker front-wheeled  -SHAUNA      Recorded by [JA] Isac Thacker, PTA 10/17/18 1452      Row Name 10/17/18 1348             Stand-Sit Transfer    Stand-Sit [place] (Transfers) minimum assist (75% patient effort);verbal cues;nonverbal cues (demo/gesture)   to sit in correct spot, cues to wait until safe to sit  -SHAUNA      Assistive Device (Stand-Sit Transfers) walker, front-wheeled  -SHAUNA      Recorded by [JA] Isac Thacker, PTA 10/17/18 1452      Row Name 10/17/18 1347             Toilet Transfer    Type (Toilet Transfer) sit-stand;stand-sit  -JA      [place] Level (Toilet Transfer) minimum assist (75% patient effort)  -SHAUNA      Assistive Device (Toilet Transfer) walker front-wheeled  -SHAUNA       Recorded by [JA] Isac Thacker, PTA 10/17/18 1452      Row Name 10/17/18 1345             Gait/Stairs Assessment/Training    Gait/Stairs Assessment/Training gait/ambulation independence;gait/ambulation assistive device;distance ambulated  -JA      [place] Level (Gait) contact guard;other (see comments)   + 1 for transport to follow for safety   -JA2      Assistive Device (Gait) walker, front-wheeled  -JA      Distance in Feet (Gait) 15x2, 150  -JA      Pattern (Gait) step-through  -JA      Deviations/Abnormal Patterns (Gait) gait speed decreased;stride length decreased  -JA      Recorded by [JA] Isac Thacker, PTA 10/17/18 1452  [JA2] Isac Thacker, PTA 10/17/18 1456      Row Name 10/17/18 1345             Positioning and Restraints    Pre-Treatment Position sitting in chair/recliner  -JA      Post Treatment Position chair  -JA      In Chair sitting;reclined;call light within reach;encouraged to call for assist;with family/caregiver  -JA      Recorded by [JA] Isac Thacker, PTA 10/17/18 1452      Row Name 10/17/18 1345             Pain Scale: Numbers Pre/Post-Treatment    Pain Scale: Numbers, Pretreatment 0/10 - no pain  -JA      Pain Scale: Numbers, Post-Treatment 0/10 - no pain  -JA      Recorded by [JA] Isac Thacker, PTA 10/17/18 1452      Row Name 10/17/18 1345             Sensory Assessment/Intervention    Sensory General Assessment --  -JA      Recorded by [JA] Isac Thacker, PTA 10/17/18 1452      Row Name 10/17/18 1345             Hearing Assessment    Hearing Status --  -JA      Recorded by [JA] Isac Thacker, PTA 10/17/18 1452      Row Name 10/17/18 1345             Vision Assessment/Intervention    Visual Impairment/Limitations --  -JA      Recorded by [JA] Isac Thacker, PTA 10/17/18 1452      Row Name 10/17/18 1345             Coping    Observed Emotional State cooperative;pleasant  -JA      Recorded by [JA] Isac Thacker, PTA 10/17/18 9857       Row Name 10/17/18 1345             Outcome Summary/Treatment Plan (PT)    Daily Summary of Progress (PT) progress toward functional goals as expected  -JA      Plan for Continued Treatment (PT) Cont. gt, therex, transfers   -JA      Anticipated Discharge Disposition (PT) home with 24/7 care;skilled nursing facility  -JA      Recorded by [SHAUNA] Isac Thacker, PTA 10/17/18 1814        User Key  (r) = Recorded By, (t) = Taken By, (c) = Cosigned By    Initials Name Effective Dates Discipline    Isac Coles, PTA 03/07/18 -  PT                       PT Rehab Goals     Row Name 10/17/18 1345             Bed Mobility Goal 1 (PT)    Activity/Assistive Device (Bed Mobility Goal 1, PT) bed mobility activities, all  -JA      [place] Level/Cues Needed (Bed Mobility Goal 1, PT) conditional independence  -JA      Time Frame (Bed Mobility Goal 1, PT) 2 days  -JA      Progress/Outcomes (Bed Mobility Goal 1, PT) goal not met  -JA         Transfer Goal 1 (PT)    Activity/Assistive Device (Transfer Goal 1, PT) sit-to-stand/stand-to-sit  -JA      [place] Level/Cues Needed (Transfer Goal 1, PT) supervision required;conditional independence  -JA      Time Frame (Transfer Goal 1, PT) 2 days  -JA      Progress/Outcome (Transfer Goal 1, PT) goal not met  -JA         Gait Training Goal 1 (PT)    Activity/Assistive Device (Gait Training Goal 1, PT) gait (walking locomotion);assistive device use;decrease fall risk;diminish gait deviation;improve balance and speed;increase endurance/gait distance;increase energy conservation  -JA      [place] Level (Gait Training Goal 1, PT) minimum assist (75% or more patient effort);moderate assist (50-74% patient effort)  -JA      Distance (Gait Goal 1, PT) 25 ft  -JA      Time Frame (Gait Training Goal 1, PT) 2 - 3 days  -JA      Progress/Outcome (Gait Training Goal 1, PT) goal met  -JA         Stairs Goal 1 (PT)    Activity/Assistive Device (Stairs Goal 1, PT) ascending  stairs;descending stairs;decrease fall risk  -      [place] Level/Cues Needed (Stairs Goal 1, PT) minimum assist (75% or more patient effort)  -JA      Number of Stairs (Stairs Goal 1, PT) 1 stair to be able to get into/out of family room  -      Time Frame (Stairs Goal 1, PT) long term goal (LTG)  -      Progress/Outcome (Stairs Goal 1, PT) goal not met  -        User Key  (r) = Recorded By, (t) = Taken By, (c) = Cosigned By    Initials Name Provider Type Discipline    Isac Coles, PTA Physical Therapy Assistant PT          Physical Therapy Education     Title: PT OT SLP Therapies (Active)     Topic: Physical Therapy (Active)     Point: Mobility training (Done)    Learning Progress Summary     Learner Status Readiness Method Response Comment Documented by    Patient Done Acceptance E VU,NR  SHAUNA 10/17/18 1452     Done Acceptance E,D VU,NR Role of PT in acute care, use of gait belt, use of RW, deep breathing.  10/16/18 1428    Family Done Acceptance E,D VU,NR Role of PT in acute care, use of gait belt, use of RW, deep breathing.  10/16/18 1428          Point: Body mechanics (Done)    Learning Progress Summary     Learner Status Readiness Method Response Comment Documented by    Patient Done Acceptance E VU,NR  SHAUNA 10/17/18 1452     Done Acceptance E,D VU,NR Role of PT in acute care, use of gait belt, use of RW, deep breathing.  10/16/18 1428    Family Done Acceptance E,D VU,NR Role of PT in acute care, use of gait belt, use of RW, deep breathing.  10/16/18 1428          Point: Precautions (Done)    Learning Progress Summary     Learner Status Readiness Method Response Comment Documented by    Patient Done Acceptance E VU,NR  SHAUNA 10/17/18 1452     Done Acceptance E,D VU,NR Role of PT in acute care, use of gait belt, use of RW, deep breathing.  10/16/18 1428    Family Done Acceptance E,D VU,NR Role of PT in acute care, use of gait belt, use of RW, deep breathing.  10/16/18 1428                       User Key     Initials Effective Dates Name Provider Type Discipline     03/07/18 -  Isac Thacker PTA Physical Therapy Assistant PT    CW 10/04/18 -  Candida Biggs, GO Physical Therapist PT                    PT Recommendation and Plan  Anticipated Discharge Disposition (PT): home with 24/7 care, skilled nursing facility  Therapy Frequency (PT Clinical Impression): other (see comments) (5-14x/week)  Outcome Summary/Treatment Plan (PT)  Daily Summary of Progress (PT): progress toward functional goals as expected  Plan for Continued Treatment (PT): Cont. gt, therex, transfers   Anticipated Discharge Disposition (PT): home with 24/7 care, skilled nursing facility  Plan of Care Reviewed With: patient  Progress: improving  Outcome Summary: Pt. able to amb. 150ft with CGA +1 to follow with transport chair for safety. Pt. transferred with [facility] sit-stand-sit this p.m. Pt. with improved ablity & mobility today vs. PT eval yesterday. Cont. gt, therex, transfers           Outcome Measures     Row Name 10/17/18 1345 10/16/18 1000 10/16/18 0842       How much help from another person do you currently need...    Turning from your back to your side while in flat bed without using bedrails? 3  -JA  -- 3  -CW    Moving from lying on back to sitting on the side of a flat bed without bedrails? 3  -JA  -- 3  -CW    Moving to and from a bed to a chair (including a wheelchair)? 3  -JA  -- 2  -CW    Standing up from a chair using your arms (e.g., wheelchair, bedside chair)? 3  -JA  -- 2  -CW    Climbing 3-5 steps with a railing? 2  -JA  -- 1  -CW    To walk in hospital room? 3  -JA  -- 2  -CW    AM-PAC 6 Clicks Score 17  -JA  -- 13  -CW       How much help from another is currently needed...    Putting on and taking off regular lower body clothing?  -- 2  -LW  --    Bathing (including washing, rinsing, and drying)  -- 2  -LW  --    Toileting (which includes using toilet bed pan or urinal)  -- 2  -LW  --    Putting  on and taking off regular upper body clothing  -- 2  -LW  --    Taking care of personal grooming (such as brushing teeth)  -- 3  -LW  --    Eating meals  -- 3  -LW  --    Score  -- 14  -LW  --       Functional Assessment    Outcome Measure Options AM-PAC 6 Clicks Basic Mobility (PT)  -  -- AM-[facility] 6 Clicks Basic Mobility (PT)  -    Row Name 10/16/18 0841             How much help from another is currently needed...    Putting on and taking off regular lower body clothing? 2  -BH      Bathing (including washing, rinsing, and drying) 2  -BH      Toileting (which includes using toilet bed pan or urinal) 2  -BH      Putting on and taking off regular upper body clothing 2  -BH      Taking care of personal grooming (such as brushing teeth) 3  -BH      Eating meals 3  -BH      Score 14  -         Functional Assessment    Outcome Measure Options AM-PAC 6 Clicks Daily Activity (OT)  -        User Key  (r) = Recorded By, (t) = Taken By, (c) = Cosigned By    Initials Name Provider Type     Liyah Mcnair, OTR/L Occupational Therapist    Isac Coles, PTA Physical Therapy Assistant    LW Alexa Desir, MONSIVAIS/L Occupational Therapy Assistant    Candida Gonzalez, PT Physical Therapist           Time Calculation:         PT Charges     Row Name 10/17/18 1456             Time Calculation    Start Time 1345  -      Stop Time 1420  -      Time Calculation (min) 35 min  -         Time Calculation- PT    Total Timed Code Minutes- PT 35 minute(s)  -         Timed Charges    07205 - Gait Training Minutes  20  -JA      97764 - PT Therapeutic Activity Minutes 15  -JA        User Key  (r) = Recorded By, (t) = Taken By, (c) = Cosigned By    Initials Name Provider Type    Isac Coles PTA Physical Therapy Assistant        Therapy Suggested Charges     Code   Minutes Charges    59773 (CPT®) Hc Pt Neuromusc Re Education Ea 15 Min      96731 (CPT®) Hc Pt Ther Proc Ea 15 Min      94492 (CPT®) Hc Gait  Training Ea 15 Min 20 1    87765 (CPT®) Hc Pt Therapeutic Act Ea 15 Min 15 1    09437 (CPT®) Hc Pt Manual Therapy Ea 15 Min      98934 (CPT®) Hc Pt Iontophoresis Ea 15 Min      32115 (CPT®) Hc Pt Elec Stim Ea-Per 15 Min      86927 (CPT®) Hc Pt Ultrasound Ea 15 Min      95911 (CPT®) Hc Pt Self Care/Mgmt/Train Ea 15 Min      54050 (CPT®) Hc Pt Prosthetic (S) Train Initial Encounter, Each 15 Min      54529 (CPT®) Hc Pt Orthotic(S)/Prosthetic(S) Encounter, Each 15 Min      83243 (CPT®) Hc Orthotic(S) Mgmt/Train Initial Encounter, Each 15min      Total  35 2        Therapy Charges for Today     Code Description Service Date Service Provider Modifiers Qty    77211867385 HC GAIT TRAINING EA 15 MIN 10/17/2018 Isac Thacker, PTA GP 1    21968638347 HC PT THERAPEUTIC ACT EA 15 MIN 10/17/2018 Isac Thacker, PTA GP 1          PT G-Codes  PT Professional Judgement Used?: Yes  Outcome Measure Options: AM-PAC 6 Clicks Basic Mobility (PT)  AM-PAC 6 Clicks Score: 17  Score: 14  Functional Limitation: Mobility: Walking and moving around  Mobility: Walking and Moving Around Current Status (): At least 40 percent but less than 60 percent impaired, limited or restricted  Mobility: Walking and Moving Around Goal Status (): At least 20 percent but less than 40 percent impaired, limited or restricted    Isac Thacker PTA  10/17/2018

## 2018-10-17 NOTE — DISCHARGE PLACEMENT REQUEST
"Mariaa Livingston (89 y.o. Female)     Date of Birth Social Security Number Address Home Phone MRN    04/10/1926  [id number]  [facility] [address] [phone] [id number]    Mandaeism Marital Status          Catholic        Admission Date Admission Type Admitting Provider Attending Provider Department, Room/Bed    10/13/18 Emergency Terence Mast MD Ahmed, Farhan, MD [address], 429/1    Discharge Date Discharge Disposition Discharge Destination                       Attending Provider:  Terence Mast MD    Allergies:  Hydrocodone-acetaminophen, Prochlorperazine Edisylate    Isolation:  None   Infection:  None   Code Status:  No CPR    Ht:  160 cm (63\")   Wt:  50 kg (110 lb 3.2 oz)    Admission Cmt:  None   Principal Problem:  None                Active Insurance as of 10/13/2018     Primary Coverage     Payor Plan Insurance Group Employer/Plan Group    MEDICARE MEDICARE A & B      Payor Plan Address Payor Plan Phone Number Effective From Effective To    [address] [phone] 4/1/1991     [facility] [address]       Subscriber Name Subscriber Birth Date Member ID       MARIAA LIVINGSTON 4/10/1926 [id number]           Secondary Coverage     Payor Plan Insurance Group Employer/Plan Group    [facility] SUPP [id number]     Payor Plan Address Payor Plan Phone Number Effective From Effective To    [address]  12/1/2016     [facility] [id number]       Subscriber Name Subscriber Birth Date Member ID       MARIAA LIVINGSTON 4/10/1926 [id number]                 Emergency Contacts      (Rel.) Home Phone Work Phone Mobile Phone    Breezy Livingston (Son) [phone] -- [phone]            Emergency Contact Information     Name Relation Home Work Mobile    Breezy Livingston Son [phone]          Insurance Information                MEDICARE/MEDICARE A & B Phone: [phone]    Subscriber: Mariaa Livingston Subscriber#: " [id number]    Group#:  Precert#:         LEE [facility]UE [facility] SUPP Phone:     Subscriber: Mariaa Pires Subscriber#: [id number]    Group#: [id number] Precert#:              History & Physical      Terence Mast MD at 10/14/2018  1:49 AM           [address], [place]. [address]  T - [phone]     H&P         SUBJECTIVE:   Patient Care Team:  Breezy Mcfarland MD as PCP - General    Chief Complaint:     Chief Complaint   Patient presents with   • Shoulder Pain   • Nausea   • Shortness of Breath       Patient is 89 y.o. female presents with past medical history of heart failure, presented to the ED with a complaint of having shortness of air.  Patient uses oxygen at nighttime only.  Most recent echo was done March 2017 shows EF of 55-60%.  On a chest x-ray the patient was found to have pneumonia.  Patient denies any other complain of this point..     HPI     ROS/HISTORY/ CURRENT MEDICATIONS/OBJECTIVE/VS/PE:   Review of Systems:   Review of Systems   Constitutional: Positive for activity change, appetite change and fatigue.   HENT: Negative for congestion and rhinorrhea.    Respiratory: Positive for chest tightness and shortness of breath. Negative for wheezing.    Cardiovascular: Negative for chest pain, palpitations and leg swelling.   Gastrointestinal: Positive for nausea. Negative for abdominal pain and constipation.   Genitourinary: Negative for dysuria and frequency.   Psychiatric/Behavioral: Negative for agitation.       History:   No past medical history on file.  No past surgical history on file.  No family history on file.  Social History   Substance Use Topics   • Smoking status: Not on file   • Smokeless tobacco: Not on file   • Alcohol use Not on file       (Not in a hospital admission)  Allergies:  Hydrocodone-acetaminophen and Prochlorperazine edisylate    Current Medications:     Current Facility-Administered Medications   Medication Dose  Route Frequency Provider Last Rate Last Dose   • ipratropium-albuterol (DUO-NEB) nebulizer solution 3 mL  3 mL Nebulization 4x Daily - RT Ramsey Rubio MD   3 mL at 10/13/18 2348   • ipratropium-albuterol (DUO-NEB) nebulizer solution 3 mL  3 mL Nebulization 4x Daily - RT Ramsey Rubio MD   3 mL at 10/14/18 0001   • sodium chloride 0.9 % flush 10 mL  10 mL Intravenous PRN Ramsey Rubio MD       • sodium chloride 0.9 % infusion  - ADS Override Pull              No current outpatient prescriptions on file.       Physical Exam:     Vital Sign Min/Max for last 24 hours  Temp  Min: 97.4 °F (36.3 °C)  Max: 98 °F (36.7 °C)   BP  Min: 173/72  Max: 240/105   Pulse  Min: 75  Max: 75   Resp  Min: 20  Max: 36   SpO2  Min: 95 %  Max: 99 %   Flow (L/min)  Min: 4  Max: 5   Weight  Min: 50.8 kg (112 lb)  Max: 50.8 kg (112 lb)   Body mass index is 19.84 kg/m².      Physical Exam:    Physical Exam   Constitutional: She appears well-developed. She appears cachectic.   Eyes: Pupils are equal, round, and reactive to light.   Cardiovascular: Normal rate, regular rhythm and normal heart sounds.    Pulmonary/Chest: She is in respiratory distress. She has wheezes. She has rales.   Currently on BiPAP   Abdominal: Soft. Bowel sounds are normal.   Musculoskeletal: Normal range of motion.   Neurological: She is alert.   Skin: Skin is warm.   Vitals reviewed.       Results Review:   Lab Results (last 24 hours)     Procedure Component Value Units Date/Time    Blood Culture - Blood, Blood, Venous Line [[id number]] Collected:  10/14/18 0112    Specimen:  Blood from Arm, Left Updated:  10/14/18 0118    [place] Draw [[id number]] Collected:  10/13/18 2309    Specimen:  Blood Updated:  10/14/18 0016    Narrative:       The following orders were created for panel order [place] Draw.  Procedure                               Abnormality         Status                     ---------                               -----------          ------                     Light Blue Top[[id number]]                                    Final result               Green Top (Gel)[[id number]]                                   Final result               Lavender Top[[id number]]                                      Final result               Gold Top - SST[[id number]]                                    Final result                 Please view results for these tests on the individual orders.    Light Blue Top [[id number]] Collected:  10/13/18 2309    Specimen:  Blood Updated:  10/14/18 0016     Extra Tube hold for add-on     Comment: Auto resulted       Green Top (Gel) [[id number]] Collected:  10/13/18 2309    Specimen:  Blood Updated:  10/14/18 0016     Extra Tube Hold for add-ons.     Comment: Auto resulted.       Lavender Top [[id number]] Collected:  10/13/18 2309    Specimen:  Blood Updated:  10/14/18 0016     Extra Tube hold for add-on     Comment: Auto resulted       Gold Top - SST [[id number]] Collected:  10/13/18 2309    Specimen:  Blood Updated:  10/14/18 0016     Extra Tube Hold for add-ons.     Comment: Auto resulted.       Blood Gas, Arterial [[id number]]  (Abnormal) Collected:  10/13/18 2348    Specimen:  Arterial Blood Updated:  10/13/18 2355     Site Right Radial     Damion's Test N/A     pH, Arterial 7.316 (L) pH units      pCO2, Arterial 52.2 (H) mm Hg      pO2, Arterial 115.0 (H) mm Hg      HCO3, Arterial 26.6 (H) mmol/L      Base Excess, Arterial -0.3 (L) mmol/L      O2 Saturation, Arterial 98.0 %      Barometric Pressure for Blood Gas 748 mmHg      Modality Nasal Cannula     Flow Rate 5.0 lpm      Ventilator Mode NA     Collected by lucinda patino    Protime-INR [[id number]]  (Normal) Collected:  10/13/18 2309    Specimen:  Blood Updated:  10/13/18 2339     Protime 13.4 Seconds      INR 1.04    Narrative:       Therapeutic range for most indications is 2.0-3.0 INR,  or 2.5-3.5 for mechanical heart valves.    BNP [[id number]]  (Abnormal) Collected:  10/13/18 2309     Specimen:  Blood Updated:  10/13/18 2337     proBNP 20,100.0 (H) pg/mL     Troponin [[id number]]  (Abnormal) Collected:  10/13/18 2309    Specimen:  Blood Updated:  10/13/18 2337     Troponin I 0.047 (H) ng/mL     Comprehensive Metabolic Panel [[id number]]  (Abnormal) Collected:  10/13/18 2309    Specimen:  Blood Updated:  10/13/18 2326     Glucose 213 (H) mg/dL      BUN 31 (H) mg/dL      Creatinine 1.08 (H) mg/dL      Sodium 132 (L) mmol/L      Potassium 5.0 mmol/L      Chloride 94 (L) mmol/L      CO2 26.0 mmol/L      Calcium 9.4 mg/dL      Total Protein 7.5 g/dL      Albumin 4.00 g/dL      ALT (SGPT) 68 (H) U/L      AST (SGOT) 56 (H) U/L      Alkaline Phosphatase 166 (H) U/L      Total Bilirubin 0.8 mg/dL      eGFR Non African Amer 47 mL/min/1.73      Globulin 3.5 gm/dL      A/G Ratio 1.1 g/dL      BUN/Creatinine Ratio 28.7 (H)     Anion Gap 12.0 mmol/L     Narrative:       The MDRD GFR formula is only valid for adults with stable renal function between ages 18 and 70.    CBC & Differential [[id number]] Collected:  10/13/18 2309    Specimen:  Blood Updated:  10/13/18 2319    Narrative:       The following orders were created for panel order CBC & Differential.  Procedure                               Abnormality         Status                     ---------                               -----------         ------                     CBC Auto Differential[[id number]]         Abnormal            Final result                 Please view results for these tests on the individual orders.    CBC Auto Differential [[id number]]  (Abnormal) Collected:  10/13/18 2309    Specimen:  Blood Updated:  10/13/18 2319     WBC 11.37 (H) 10*3/mm3      RBC 4.22 10*6/mm3      Hemoglobin 12.6 g/dL      Hematocrit 38.3 %      MCV 90.8 fL      MCH 29.9 pg      MCHC 32.9 g/dL      RDW 14.0 %      RDW-SD 45.9 fl      MPV 8.9 fL      Platelets 333 10*3/mm3      Neutrophil % 78.9 %      Lymphocyte % 12.1 %      Monocyte % 7.9 %      Eosinophil % 0.6 %       Basophil % 0.4 %      Immature Grans % 0.1 %      Neutrophils, Absolute 8.96 (H) 10*3/mm3      Lymphocytes, Absolute 1.38 10*3/mm3      Monocytes, Absolute 0.90 10*3/mm3      Eosinophils, Absolute 0.07 10*3/mm3      Basophils, Absolute 0.05 10*3/mm3      Immature Grans, Absolute 0.01 10*3/mm3      nRBC 0.0 /100 WBC               Imaging Results (last 24 hours)     Procedure Component Value Units Date/Time    XR Chest 1 View [[id number]] Collected:  10/13/18 2307     Updated:  10/13/18 2328    Narrative:         Chest single view on  10/13/2018     CLINICAL INDICATION: Chest pain    COMPARISON: None    FINDINGS: 2-lead left subclavian pacemaker is noted in place.  Borderline cardiomegaly is noted. There is mild scoliosis of the  spine. There is small left pleural effusion. There are bibasilar  opacities consistent with atelectasis and/or pneumonia. Mild  chronic interstitial changes are noted. Vascular calcification is  noted in the aorta.      Impression:       Small left pleural effusion with bibasilar  atelectasis and/or pneumonia.    Electronically signed by:  Zackery Marcano  10/13/2018 11:27 PM  CDT Workstation: [id number]           I reviewed the patient's new clinical results.  I reviewed the patient's new imaging results and agree with the interpretation.     ASSESSMENT/PLAN:   Assessment/Plan   Active Hospital Problems    Diagnosis Date Noted   • Acute on chronic respiratory failure with hypoxia (CMS/HCC) [J96.21] 10/14/2018   • Pneumonia [J18.9] 10/14/2018     1.  Acute on chronic hypoxic respiratory failure: Patient does not appear to be in volume overload, likely due to pneumonia.  Will treat pneumonia.  2.  Pneumonia: Patient will be started on IV antibiotic, will obtain cultures and follow-up sensitivity.    DVT ppx:scd    I discussed the patient's findings and my recommendations with patient, family and nursing staff.              This document has been electronically signed by Terence Mast  MD on October 14, 2018 1:49 AM                           Electronically signed by Terence Mast MD at 10/14/2018  1:53 AM       Hospital Medications (active)       Dose Frequency Start End    aspirin chewable tablet 81 mg 81 mg Daily 10/15/2018     Sig - Route: Chew 1 tablet Daily. - Oral    atorvastatin (LIPITOR) tablet 20 mg 20 mg 3 Times Weekly 10/15/2018     Sig - Route: Take 1 tablet by mouth Once per day on Mon Wed Fri. - Oral    digoxin (LANOXIN) tablet 125 mcg 125 mcg Daily Digoxin 10/15/2018     Sig - Route: Take 1 tablet by mouth Daily. - Oral    furosemide (LASIX) injection 20 mg 20 mg Every 12 Hours 10/15/2018     Sig - Route: Infuse 2 mL into a venous catheter Every 12 (Twelve) Hours. - Intravenous    ipratropium-albuterol (DUO-NEB) nebulizer solution 3 mL 3 mL 4 Times Daily - RT 10/14/2018     Sig - Route: Take 3 mL by nebulization 4 (Four) Times a Day. - Nebulization    levoFLOXacin (LEVAQUIN) tablet 250 mg 250 mg Every Other Day 10/16/2018 10/20/2018    Sig - Route: Take 1 tablet by mouth Every Other Day. - Oral    Cosign for Ordering: Accepted by Terence Mast MD on 10/16/2018 10:00 AM    levothyroxine (SYNTHROID, LEVOTHROID) tablet 37.5 mcg 37.5 mcg Every Early Morning 10/15/2018     Sig - Route: Take 1 half tablet by mouth Every Morning. - Oral    lisinopril (PRINIVIL,ZESTRIL) tablet 5 mg 5 mg Daily 10/15/2018     Sig - Route: Take 1 tablet by mouth Daily. - Oral    lisinopril (PRINIVIL,ZESTRIL) tablet 5 mg 5 mg Once 10/17/2018 10/17/2018    Sig - Route: Take 1 tablet by mouth 1 (One) Time. - Oral    LORazepam (ATIVAN) injection 0.5 mg 0.5 mg Every 8 Hours PRN 10/14/2018 10/24/2018    Sig - Route: Infuse 0.25 mL into a venous catheter Every 8 (Eight) Hours As Needed for Anxiety. - Intravenous    Magnesium Sulfate 2 gram / 50mL Infusion (GIVE X 3 BAGS TO EQUAL 6GM TOTAL DOSE) - Mg 1.1 - 1.5 mg/dl 2 g As Needed 10/17/2018     Sig - Route: Infuse 50 mL into a venous catheter As Needed (See  "Administration Instructions). - Intravenous    Linked Group 1:  \"Or\" Linked Group Details        Magnesium Sulfate 2 gram Bolus, followed by 8 gram infusion (total Mg dose 10 grams)- Mg less than or equal to 1mg/dL 2 g As Needed 10/17/2018     Sig - Route: Infuse 50 mL into a venous catheter As Needed (See Administration Instructions). - Intravenous    Linked Group 1:  \"Or\" Linked Group Details        Magnesium Sulfate 4 gram infusion- Mg 1.6-1.9 mg/dL 4 g As Needed 10/17/2018     Sig - Route: Infuse 100 mL into a venous catheter As Needed (See Administration Instructions). - Intravenous    Linked Group 1:  \"Or\" Linked Group Details        meclizine (ANTIVERT) tablet 12.5 mg 12.5 mg 2 Times Daily 10/14/2018     Sig - Route: Take 1 tablet by mouth 2 (Two) Times a Day. - Oral    montelukast (SINGULAIR) tablet 10 mg 10 mg Nightly 10/14/2018     Sig - Route: Take 1 tablet by mouth Every Night. - Oral    nitroglycerin (NITROSTAT) SL tablet 0.4 mg 0.4 mg Every 5 Minutes PRN 10/16/2018     Sig - Route: Place 1 tablet under the tongue Every 5 (Five) Minutes As Needed for Chest Pain. - Sublingual    nystatin (MYCOSTATIN) 475800 UNIT/ML suspension 500,000 Units 5 mL 4 Times Daily 10/15/2018     Sig - Route: Swish and swallow 5 mL 4 (Four) Times a Day. - Swish & Swallow    pantoprazole (PROTONIX) EC tablet 40 mg 40 mg Every Morning 10/15/2018     Sig - Route: Take 1 tablet by mouth Every Morning. - Oral    sertraline (ZOLOFT) tablet 50 mg 50 mg Daily 10/15/2018     Sig - Route: Take 1 tablet by mouth Daily. - Oral    sodium chloride 0.9 % flush 3 mL 3 mL Every 12 Hours Scheduled 10/14/2018     Sig - Route: Infuse 3 mL into a venous catheter Every 12 (Twelve) Hours. - Intravenous    sodium chloride 0.9 % flush 3-10 mL 3-10 mL As Needed 10/14/2018     Sig - Route: Infuse 3-10 mL into a venous catheter As Needed for Line Care. - Intravenous    sodium chloride 0.9 % infusion  - ADS Override Pull   10/14/2018     Notes to Pharmacy: " TALHA CUELLAR: cabinet override             Physician Progress Notes (most recent note)      Dasha Bose MD at 10/16/2018 10:48 PM        Nurse called me as patient had some chest pain but by the time I got there it was already subsided on its own.  EKG done revealed patient is in A. fib with controlled rate of around 90, with ST-T changes in anteroseptal leads suggestive of possible acute MI.  Prior EKG suggestive of pacemaker rhythm.  Could not look any other tracings prior.  But patient does have known history of A. fib in the past.  Also reviewed echocardiogram done by Dr. Lyon with significant abnormalities.  Patient also now has been seen by palliative care and family is thinking about proceeding with palliative care.  Also patient had 8-10 falls in past 1 month.  Considering all about details IV ordered nitroglycerin when necessary.  Already ordered troponin and CK and CK-MB's THAT order but would not need any further treatment plan.  Patient is also not a candidate for anticoagulation due to multiple comorbidities, frequent falls and very high risk of bleeding.  Patient also wants conservative treatment.  Discussed with patient and family member at bedside.  Spoke with nursing staff.    Electronically signed by Dasha Bose MD at 10/16/2018 10:51 PM       Consult Notes (most recent note)     No notes of this type exist for this encounter.           Physical Therapy Notes (most recent note)      Candida Biggs, PT at 10/16/2018  2:32 PM  Version 1 of 1         Acute Care - Physical Therapy Initial Evaluation  [facility]     Patient Name: Mariaa Pires  : 4/10/1926  MRN: [id number]  Today's Date: 10/16/2018   Onset of Illness/Injury or Date of Surgery: 10/13/18  Date of Referral to PT: 10/15/18  Referring Physician: Dr. North      Admit Date: 10/13/2018    Visit Dx:     ICD-10-CM ICD-9-CM   1. Acute on chronic respiratory failure with hypoxia (CMS/[facility]) J96.21 518.84      799.02   2. Pneumonia due to infectious organism, unspecified laterality, unspecified part of lung J18.9 136.9     484.8   3. NSTEMI (non-ST elevated myocardial infarction) (CMS/[facility]) I21.4 410.70   4. Oropharyngeal dysphagia R13.12 787.22   5. Impaired mobility and ADLs Z74.09 799.89   6. Impaired functional mobility, balance, gait, and endurance Z74.09 V49.89     Patient Active Problem List   Diagnosis   • Pneumonia   • Bilateral pleural effusion   • Osteoporosis   • Acute respiratory failure with hypoxia and hypercapnia (CMS/[facility])   • Diabetes mellitus (CMS/[facility])   • Hypertension   • Pulmonary hypertension (CMS/[facility])   • Aortic valve stenosis     Past Medical History:   Diagnosis Date   • Diabetes mellitus (CMS/[facility]) 10/14/2018   • Hypertension    • Osteoporosis 10/14/2018     History reviewed. No pertinent surgical history.     PT ASSESSMENT (last 12 hours)      Physical Therapy Evaluation     Row Name 10/16/18 0842          PT Evaluation Time/Intention    Subjective Information no complaints  -CW     Document Type evaluation  -CW     Mode of Treatment co-treatment;physical therapy;occupational therapy  -CW     Patient Effort good  -CW     Symptoms Noted During/After Treatment fatigue  -CW     Row Name 10/16/18 0842          General Information    Patient Profile Reviewed? yes  -CW     Onset of Illness/Injury or Date of Surgery 10/13/18  -CW     Referring Physician Dr. North  -CW     Patient Observations alert;cooperative;agree to therapy  -CW     Patient/Family Observations no family present initally, son and daughter-in-law present  -CW     General Observations of Patient pt supine HOB elevated, on 2 L O2 via NC, tele, IV,   -CW     Prior Level of Function independent:;all household mobility;transfer   family does IADL's  -CW     Equipment Currently Used at Home commode, bedside;grab bar;oxygen;walker, rolling;wheelchair;ramp;bath bench  -CW     Pertinent History of Current Functional Problem Pt came in with SOB,  dx with pneumonia  -CW     Existing Precautions/Restrictions fall;oxygen therapy device and L/min  -CW     Limitations/Impairments hearing;safety/cognitive  -CW     Equipment Issued to Patient gait belt  -CW     Risks Reviewed patient and family:;LOB;nausea/vomiting;dizziness;increased discomfort;change in vital signs;increased drainage;lines disloged  -CW     Benefits Reviewed patient and family:;improve function;increase independence;increase strength;increase balance;decrease pain;decrease risk of DVT;improve skin integrity;increase knowledge  -CW     Barriers to Rehab cognitive status;hearing deficit  -CW     Row Name 10/16/18 0842          Relationship/Environment    Lives With alone  -CW     Concerns About Impact on Relationships looking for sitters, especially at night; family reported last 2 weeks decreased safety, and independence with ADL's, multiple falls, they report pt forgets to use RW in house, daughter in law concerned with getting 24/7 at home, discussed with case management.  -CW     Row Name 10/16/18 0842          Resource/Environmental Concerns    Current Living Arrangements home/apartment/condo  -CW     Row Name 10/16/18 0842          Stairs Within Home, Primary    Number of Stairs, Within Home, Primary one  -CW     Stairs Comment, Within Home, Primary family room to kitchen no handrails  -CW     Row Name 10/16/18 0842          Cognitive Assessment/Interventions    Additional Documentation Cognitive Assessment/Intervention (Group)  -CW     Row Name 10/16/18 0842          Cognitive Assessment/Intervention- PT/OT    Affect/Mental Status (Cognitive) confused  -CW     Orientation Status (Cognition) oriented x 4;verbal cues/prompts needed for orientation  -CW     Follows Commands (Cognition) follows one step commands;75-90% accuracy;verbal cues/prompting required;repetition of directions required;physical/tactile prompts required  -CW     Safety Deficit (Cognitive) moderate deficit;severe deficit   -CW     Personal Safety Interventions fall prevention program maintained;gait belt;nonskid shoes/slippers when out of bed;supervised activity  -CW     Row Name 10/16/18 0842          Bed Mobility Assessment/Treatment    Bed Mobility Assessment/Treatment supine-sit;sit-supine  -CW     Supine-Sit [place] (Bed Mobility) verbal cues   SBA  -CW     Sit-Supine [place] (Bed Mobility) verbal cues   SBA  -CW     Bed Mobility, Safety Issues decreased use of arms for pushing/pulling;decreased use of legs for bridging/pushing;impaired trunk control for bed mobility  -CW     Assistive Device (Bed Mobility) bed rails;head of bed elevated  -CW     Comment (Bed Mobility) pt required extended time and effort and reported feeling SOB  -CW     Row Name 10/16/18 0842          Transfer Assessment/Treatment    Transfer Assessment/Treatment sit-stand transfer;stand-sit transfer  -CW     Comment (Transfers) pt very inconsistent with t/f and standing balance, tendency to lean backwards, trouble with hand placement  -CW     Sit-Stand [place] (Transfers) minimum assist (75% patient effort)  -CW     Stand-Sit [place] (Transfers) minimum assist (75% patient effort);moderate assist (50% patient effort);verbal cues;nonverbal cues (demo/gesture)   to sit in correct spot, cues to wait until safe to sit  -CW     Row Name 10/16/18 0842          Sit-Stand Transfer    Assistive Device (Sit-Stand Transfers) walker, front-wheeled  -CW     Row Name 10/16/18 0842          Stand-Sit Transfer    Assistive Device (Stand-Sit Transfers) walker, front-wheeled  -CW     Row Name 10/16/18 0842          Gait/Stairs Assessment/Training    Gait/Stairs Assessment/Training gait/ambulation independence;gait/ambulation assistive device;distance ambulated  -CW     [place] Level (Gait) maximum assist (25% patient effort);verbal cues;other (see comments)   cues for AD use  -CW     Assistive Device (Gait) walker, front-wheeled  -CW     Distance in  Feet (Gait) 15 ft.  -CW     Pattern (Gait) step-to  -CW     Deviations/Abnormal Patterns (Gait) gait speed decreased;festinating/shuffling;stride length decreased  -CW     Row Name 10/16/18 08          General ROM    GENERAL ROM COMMENTS BLE WFL throughout  -CW     Row Name 10/16/18 0842          MMT (Manual Muscle Testing)    General MMT Comments BLE grossly 3+/5 throughout  -CW     Row Name 10/16/18 0842          Sensory Assessment/Intervention    Sensory General Assessment no sensation deficits identified  -CW     Row Name 10/16/18 0842          Hearing Assessment    Hearing Status hearing impairment, bilaterally  -CW     Row Name 10/16/18 0842          Vision Assessment/Intervention    Visual Impairment/Limitations corrective lenses full time  -CW     Row Name 10/16/18 0842          Pain Scale: Numbers Pre/Post-Treatment    Pain Scale: Numbers, Pretreatment 8/10  -CW     Pain Scale: Numbers, Post-Treatment 6/10  -CW     Pain Location throat  -CW     Pain Intervention(s) Repositioned;Ambulation/increased activity;Distraction;Rest  -CW     Row Name 10/16/18 08          Coping    Observed Emotional State cooperative;pleasant  -CW     Row Name 10/16/18 08          Plan of Care Review    Plan of Care Reviewed With patient;family  -CW     Row Name 10/16/18 [facility]          Physical Therapy Clinical Impression    Date of Referral to PT 10/15/18  -CW     PT Diagnosis (PT Clinical Impression) Impaired functional mobility, endurance, gait, and balance  -CW     Patient/Family Goals Statement (PT Clinical Impression) To regain prior level of function, independence with t/f, household mobility  -CW     Criteria for Skilled Interventions Met (PT Clinical Impression) yes  -CW     Pathology/Pathophysiology Noted (Describe Specifically for Each System) musculoskeletal;pulmonary  -CW     Impairments Found (describe specific impairments) aerobic capacity/endurance;arousal, attention, and cognition;gait, locomotion, and  balance;muscle performance;ventilation and respiration/gas exchange  -CW     Functional Limitations in Following Categories (Describe Specific Limitations) self-care;home management;community/leisure  -CW     Rehab Potential (PT Clinical Summary) fair, will monitor progress closely  -CW     Care Plan Review (PT) evaluation/treatment results reviewed;patient/other agree to care plan  -CW     Care Plan Review, Other Participant (PT Clinical Impression) family  -CW     Patient/Family Concerns, Anticipated Discharge Disposition (PT) Concerned with attaining 24/7 care  -CW     Row Name 10/16/18 0842          Vital Signs    Pre Systolic BP Rehab 185  -CW     Pre Treatment Diastolic BP 68  -CW     Post Systolic BP Rehab 165   RN notified  -CW     Post Treatment Diastolic BP 67  -CW     Pretreatment Heart Rate (beats/min) 76  -CW     Intratreatment Heart Rate (beats/min) 80  -CW     Posttreatment Heart Rate (beats/min) 85  -CW     Pre SpO2 (%) 99  -CW     O2 Delivery Pre Treatment supplemental O2  -CW     Intra SpO2 (%) 98  -CW     O2 Delivery Intra Treatment supplemental O2  -CW     Post SpO2 (%) 97  -CW     O2 Delivery Post Treatment supplemental O2  -CW     Pre Patient Position Supine  -CW     Intra Patient Position Standing  -CW     Post Patient Position Supine  -CW     Row Name 10/16/18 0842          Physical Therapy Goals    Bed Mobility Goal Selection (PT) bed mobility, PT goal 1  -CW     Transfer Goal Selection (PT) transfer, PT goal 1  -CW     Gait Training Goal Selection (PT) gait training, PT goal 1  -CW     Stairs Goal Selection (PT) stairs, PT goal 1  -CW     Additional Documentation Stairs Goal Selection (PT) (Row)  -CW     Row Name 10/16/18 0842          Bed Mobility Goal 1 (PT)    Activity/Assistive Device (Bed Mobility Goal 1, PT) bed mobility activities, all  -CW     [place] Level/Cues Needed (Bed Mobility Goal 1, PT) conditional independence  -CW     Time Frame (Bed Mobility Goal 1, PT) 2 days  -CW      Progress/Outcomes (Bed Mobility Goal 1, PT) goal not met  -CW     Row Name 10/16/18 0842          Transfer Goal 1 (PT)    Activity/Assistive Device (Transfer Goal 1, PT) sit-to-stand/stand-to-sit  -CW     [place] Level/Cues Needed (Transfer Goal 1, PT) supervision required;conditional independence  -CW     Time Frame (Transfer Goal 1, PT) 2 days  -CW     Progress/Outcome (Transfer Goal 1, PT) goal not met  -CW     Row Name 10/16/18 0842          Gait Training Goal 1 (PT)    Activity/Assistive Device (Gait Training Goal 1, PT) gait (walking locomotion);assistive device use;decrease fall risk;diminish gait deviation;improve balance and speed;increase endurance/gait distance;increase energy conservation  -CW     [place] Level (Gait Training Goal 1, PT) minimum assist (75% or more patient effort);moderate assist (50-74% patient effort)  -CW     Distance (Gait Goal 1, PT) 25 ft  -CW     Time Frame (Gait Training Goal 1, PT) 2 - 3 days  -CW     Progress/Outcome (Gait Training Goal 1, PT) goal not met  -CW     Row Name 10/16/18 0842          Stairs Goal 1 (PT)    Activity/Assistive Device (Stairs Goal 1, PT) ascending stairs;descending stairs;decrease fall risk  -CW     [place] Level/Cues Needed (Stairs Goal 1, PT) minimum assist (75% or more patient effort)  -CW     Number of Stairs (Stairs Goal 1, PT) 1 stair to be able to get into/out of family room  -CW     Time Frame (Stairs Goal 1, PT) long term goal (LTG)  -CW     Progress/Outcome (Stairs Goal 1, PT) goal not met  -CW     Row Name 10/16/18 0842          Positioning and Restraints    Pre-Treatment Position in bed  -CW     Post Treatment Position bed  -CW     In Bed supine;call light within reach;encouraged to call for assist;exit alarm on;with family/caregiver;side rails up x3  -CW     Row Name 10/16/18 0842          Living Environment    Home Accessibility wheelchair accessible;stairs within home;tub/shower is not walk in  -CW       User Key  (r) =  Recorded By, (t) = Taken By, (c) = Cosigned By    Initials Name Provider Type    CW Candida Biggs, GO Physical Therapist          Physical Therapy Education     Title: PT OT SLP Therapies (Active)     Topic: Physical Therapy (Active)     Point: Mobility training (Done)    Learning Progress Summary     Learner Status Readiness Method Response Comment Documented by    Patient Done Acceptance E,D VU,NR Role of PT in acute care, use of gait belt, use of RW, deep breathing.  10/16/18 1428    Family Done Acceptance E,D VU,NR Role of PT in acute care, use of gait belt, use of RW, deep breathing.  10/16/18 1428          Point: Body mechanics (Done)    Learning Progress Summary     Learner Status Readiness Method Response Comment Documented by    Patient Done Acceptance E,D VU,NR Role of PT in acute care, use of gait belt, use of RW, deep breathing.  10/16/18 1428    Family Done Acceptance E,D VU,NR Role of PT in acute care, use of gait belt, use of RW, deep breathing.  10/16/18 1428          Point: Precautions (Done)    Learning Progress Summary     Learner Status Readiness Method Response Comment Documented by    Patient Done Acceptance E,D VU,NR Role of PT in acute care, use of gait belt, use of RW, deep breathing.  10/16/18 1428    Family Done Acceptance E,D VU,NR Role of PT in acute care, use of gait belt, use of RW, deep breathing.  10/16/18 1428                      User Key     Initials Effective Dates Name Provider Type Discipline     10/04/18 -  Candida Biggs PT Physical Therapist PT                PT Recommendation and Plan  Anticipated Discharge Disposition (PT): home with 24/7 care  Planned Therapy Interventions (PT Eval): balance training, bed mobility training, gait training, home exercise program, stair training, strengthening, transfer training  Therapy Frequency (PT Clinical Impression): other (see comments) (5-14x/week)  Outcome Summary/Treatment Plan (PT)  Anticipated Discharge  Disposition (PT): home with 24/7 care  Patient/Family Concerns, Anticipated Discharge Disposition (PT): Concerned with attaining 24/7 care  Plan of Care Reviewed With: patient, family  Progress: improving  Outcome Summary: PT eval on this date with OT co-dion. Pt is 89 y.o. female who came to ED with SOB and dx with pneumonia. Pt BP elevated and RN notified. Pt demonstrated difficulty with sequencing, problem solving, balance, and motor planning  throughout evaluation. Pt eval revealed decreased LE strength, impaired functional mobility, endurance, and balance. Pt required SBA and verbal cues for supine to sit, sit to supine, min A for sit to stand and stand to sit with FWW, and max A with ambulating 15 ft with FWW in room. Pt would benefit from further skilled physical therapy to address these impairments, improve safety awareness, AD use, and reduce falls risk. Pt currently lives alone and is unsafe to return home without 24/7 care, discharge to SNF or home with 24/7 skilled care and home health recommended when appropriate.          Outcome Measures     Row Name 10/16/18 1000 10/16/18 0842 10/16/18 0841       How much help from another person do you currently need...    Turning from your back to your side while in flat bed without using bedrails?  -- 3  -CW  --    Moving from lying on back to sitting on the side of a flat bed without bedrails?  -- 3  -CW  --    Moving to and from a bed to a chair (including a wheelchair)?  -- 2  -CW  --    Standing up from a chair using your arms (e.g., wheelchair, bedside chair)?  -- 2  -CW  --    Climbing 3-5 steps with a railing?  -- 1  -CW  --    To walk in hospital room?  -- 2  -CW  --    AM-PAC 6 Clicks Score  -- 13  -CW  --       How much help from another is currently needed...    Putting on and taking off regular lower body clothing? 2  -LW  -- 2  -BH    Bathing (including washing, rinsing, and drying) 2  -LW  -- 2  -BH    Toileting (which includes using toilet bed pan  or urinal) 2  -LW  -- 2  -BH    Putting on and taking off regular upper body clothing 2  -LW  -- 2  -BH    Taking care of personal grooming (such as brushing teeth) 3  -LW  -- 3  -BH    Eating meals 3  -LW  -- 3  -BH    Score 14  -LW  -- 14  -       Functional Assessment    Outcome Measure Options  -- AM-PAC 6 Clicks Basic Mobility (PT)  -CW AM-PAC 6 Clicks Daily Activity (OT)  -BH      User Key  (r) = Recorded By, (t) = Taken By, (c) = Cosigned By    Initials Name Provider Type     Liyah Mcnair, OTR/L Occupational Therapist    LW Alexa Desir, MONSIVAIS/L Occupational Therapy Assistant    CW Candida Biggs, PT Physical Therapist           Time Calculation:         PT Charges     Row Name 10/16/18 0841             Time Calculation    Start Time 0842  -CW      Stop Time 0938  -CW      Time Calculation (min) 56 min  -CW      PT Received On 10/16/18  -CW      PT Goal Re-Cert Due Date 10/29/18  -CW         Time Calculation- PT    Total Timed Code Minutes- PT 15 minute(s)  -CW         Timed Charges    29107 - PT Therapeutic Activity Minutes 15  -CW        User Key  (r) = Recorded By, (t) = Taken By, (c) = Cosigned By    Initials Name Provider Type    Candida Gonzalez PT Physical Therapist        Therapy Suggested Charges     Code   Minutes Charges    90444 (CPT®) Hc Pt Neuromusc Re Education Ea 15 Min      13729 (CPT®) Hc Pt Ther Proc Ea 15 Min      47870 (CPT®) Hc Gait Training Ea 15 Min      40123 (CPT®) Hc Pt Therapeutic Act Ea 15 Min 15 1    40588 (CPT®) Hc Pt Manual Therapy Ea 15 Min      21029 (CPT®) Hc Pt Iontophoresis Ea 15 Min      88052 (CPT®) Hc Pt Elec Stim Ea-Per 15 Min      97874 (CPT®) Hc Pt Ultrasound Ea 15 Min      76799 (CPT®) Hc Pt Self Care/Mgmt/Train Ea 15 Min      98390 (CPT®) Hc Pt Prosthetic (S) Train Initial Encounter, Each 15 Min      60888 (CPT®) Hc Pt Orthotic(S)/Prosthetic(S) Encounter, Each 15 Min      10990 (CPT®) Hc Orthotic(S) Mgmt/Train Initial Encounter, Each 15min       Total  15 1        Therapy Charges for Today     Code Description Service Date Service Provider Modifiers Qty    93899527678 HC PT MOBILITY CURRENT 10/16/2018 Candida Biggs, PT GP, CK 1    49634159709 HC PT MOBILITY PROJECTED 10/16/2018 Candida Biggs, PT GP, CJ 1    86058751744 HC PT EVAL MOD COMPLEXITY 2 10/16/2018 Candida Biggs, PT GP 1    44272736499 HC PT THERAPEUTIC ACT EA 15 MIN 10/16/2018 Candida Biggs, PT GP 1          PT G-Codes  PT Professional Judgement Used?: Yes  Outcome Measure Options: AM-PAC 6 Clicks Basic Mobility (PT)  AM-PAC 6 Clicks Score: 13  Score: 14  Functional Limitation: Mobility: Walking and moving around  Mobility: Walking and Moving Around Current Status (): At least 40 percent but less than 60 percent impaired, limited or restricted  Mobility: Walking and Moving Around Goal Status (): At least 20 percent but less than 40 percent impaired, limited or restricted      Candida Biggs PT  10/16/2018         Electronically signed by Candida Biggs, PT at 10/16/2018  2:32 PM          Occupational Therapy Notes (most recent note)      Alexa Desir COTA/L at 10/16/2018 12:11 PM          Acute Care - Occupational Therapy Treatment Note  [facility]     Patient Name: Mariaa Pires  : 4/10/1926  MRN: [id number]  Today's Date: 10/16/2018  Onset of Illness/Injury or Date of Surgery: 10/13/18  Date of Referral to OT: 10/15/18  Referring Physician: Dr. North    Admit Date: 10/13/2018       ICD-10-CM ICD-9-CM   1. Acute on chronic respiratory failure with hypoxia (CMS/[facility]) J96.21 518.84     799.02   2. Pneumonia due to infectious organism, unspecified laterality, unspecified part of lung J18.9 136.9     484.8   3. NSTEMI (non-ST elevated myocardial infarction) (CMS/[facility]) I21.4 410.70   4. Oropharyngeal dysphagia R13.12 787.22   5. Impaired mobility and ADLs Z74.09 799.89     Patient Active Problem List   Diagnosis   • Pneumonia   • Bilateral pleural  effusion   • Osteoporosis   • Acute respiratory failure with hypoxia and hypercapnia (CMS/HCC)   • Diabetes mellitus (CMS/HCC)   • Hypertension   • Pulmonary hypertension (CMS/HCC)   • Aortic valve stenosis     Past Medical History:   Diagnosis Date   • Diabetes mellitus (CMS/HCC) 10/14/2018   • Hypertension    • Osteoporosis 10/14/2018     History reviewed. No pertinent surgical history.    Therapy Treatment          Rehabilitation Treatment Summary     Row Name 10/16/18 1000             Treatment Time/Intention    Discipline occupational therapy assistant  -LW      Document Type therapy note (daily note)  -LW      Subjective Information no complaints  -LW      Mode of Treatment occupational therapy  -LW      Patient/Family Observations Son present  -LW      Care Plan Review care plan/treatment goals reviewed  -LW      Care Plan Review, Other Participant(s) son  -LW      Total Minutes, Occupational Therapy Treatment 40  -LW      Therapy Frequency (OT Eval) other (see comments)   5-7 days per week  -LW      Patient Effort good  -LW      Existing Precautions/Restrictions fall;oxygen therapy device and L/min  -LW      Equipment Issued to Patient gait belt  -LW      Recorded by [LW] Alexa Desir COTA/L 10/16/18 1207      Row Name 10/16/18 1000             Vital Signs    Pre Systolic BP Rehab 179  -LW      Pre Treatment Diastolic BP 72  -LW      Pretreatment Heart Rate (beats/min) 73  -LW      Posttreatment Heart Rate (beats/min) 77  -LW      Pre SpO2 (%) 98  -LW      O2 Delivery Pre Treatment supplemental O2  -LW      Post SpO2 (%) 97  -LW      O2 Delivery Post Treatment supplemental O2  -LW      Pre Patient Position --   long sitting  -LW      Post Patient Position --   long sitting  -LW      Recorded by [LW] Alexa Desir COTA/L 10/16/18 1207      Row Name 10/16/18 1000             Cognitive Assessment/Intervention- PT/OT    Affect/Mental Status (Cognitive) WFL  -LW      Orientation Status (Cognition)  oriented x 4  -LW      Follows Commands (Cognition) follows one step commands  -LW      Safety Deficit (Cognitive) moderate deficit  -LW      Personal Safety Interventions fall prevention program maintained;gait belt;nonskid shoes/slippers when out of bed  -LW      Recorded by [LW] Alexa Desir COTA/L 10/16/18 1207      Row Name 10/16/18 1000             Safety Issues, Functional Mobility    Impairments Affecting Function (Mobility) balance;coordination;endurance/activity tolerance  -LW      Recorded by [LW] Alexa Desir COTA/L 10/16/18 1207      Row Name 10/16/18 1000             ADL Assessment/Intervention    BADL Assessment/Intervention grooming  -LW      Recorded by [LW] Alexa Desir COTA/L 10/16/18 1207      Row Name 10/16/18 1000             Grooming Assessment/Training    [place] Level (Grooming) hair care, combing/brushing;oral care regimen;wash face, hands;set up;supervision  -LW      Grooming Position long sitting  -LW      Recorded by [LW] Alexa Desir COTA/L 10/16/18 1207      Row Name 10/16/18 1000             Therapeutic Exercise    Upper Extremity Range of Motion (Therapeutic Exercise) other (see comments)   ROM in all planes  -LW      Position (Therapeutic Exercise) seated  -LW      Sets/Reps (Therapeutic Exercise) 10x  -LW      Equipment (Therapeutic Exercise) resistive bands   Yellow theraband  -LW      Recorded by [LW] Alexa Desir COTA/L 10/16/18 1207      Row Name 10/16/18 1000             Positioning and Restraints    Pre-Treatment Position in bed  -LW      Post Treatment Position bed  -LW      In Bed supine;call light within reach;encouraged to call for assist;exit alarm on;with family/caregiver  -LW      Recorded by [LW] Alexa Desir COTA/L 10/16/18 1207      Row Name 10/16/18 1000             Pain Scale: Numbers Pre/Post-Treatment    Pain Scale: Numbers, Pretreatment 0/10 - no pain  -LW      Pain Scale: Numbers, Post-Treatment 0/10 - no pain  -LW       Recorded by [LW] Alexa Desir COTA/L 10/16/18 1207      Row Name 10/16/18 1000             Hearing Assessment    Hearing Status hearing aid, bilateral  -LW      Recorded by [LW] Alexa Desir COTA/L 10/16/18 1207      Row Name 10/16/18 1000             Vision Assessment/Intervention    Visual Impairment/Limitations corrective lenses full time  -LW      Recorded by [LW] Alexa Desir COTA/L 10/16/18 1207      Row Name 10/16/18 1000             Light Touch Sensation Assessment    Left Upper Extremity: Light Touch Sensation Assessment intact  -LW      Right Upper Extremity: Light Touch Sensation Assessment mild impairment, 75% or more correct responses  -LW      Recorded by [LW] Alexa Desir COTA/L 10/16/18 1207      Row Name 10/16/18 1000             Coping    Observed Emotional State accepting;calm;cooperative  -LW      Verbalized Emotional State acceptance  -LW      Recorded by [LW] Alexa Desir COTA/L 10/16/18 1207      Row Name 10/16/18 1000             Plan of Care Review    Plan of Care Reviewed With patient  -LW      Recorded by [LW] Alexa Desir COTA/L 10/16/18 1207      Row Name 10/16/18 1000             Outcome Summary/Treatment Plan (OT)    Daily Summary of Progress (OT) progress toward functional goals is good  -LW      Plan for Continued Treatment (OT) Continue POC  -LW      Anticipated Discharge Disposition (OT) home with 24/7 care;home with home health;skilled nursing facility  -LW      Recorded by [LW] Alexa Desir COTA/L 10/16/18 1207        User Key  (r) = Recorded By, (t) = Taken By, (c) = Cosigned By    Initials Name Effective Dates Discipline    LW Alexa Desir COTA/L 03/07/18 -  OT                   OT Rehab Goals     Row Name 10/16/18 1000 10/16/18 0841          Transfer Goal 1 (OT)    Activity/Assistive Device (Transfer Goal 1, OT) toilet  -LW toilet  -     [place] Level/Cues Needed (Transfer Goal 1, OT) minimum assist (75% or more patient effort)   -LW minimum assist (75% or more patient effort)  -BH     Time Frame (Transfer Goal 1, OT) long term goal (LTG);by discharge  -LW long term goal (LTG);by discharge  -     Progress/Outcome (Transfer Goal 1, OT) goal not met  -LW goal not met  -BH        Bathing Goal 1 (OT)    Activity/Assistive Device (Bathing Goal 1, OT) bathing skills, all  -LW bathing skills, all  -BH     [place] Level/Cues Needed (Bathing Goal 1, OT) minimum assist (75% or more patient effort)  -LW minimum assist (75% or more patient effort)  -     Time Frame (Bathing Goal 1, OT) long term goal (LTG);by discharge  -LW long term goal (LTG);by discharge  -     Progress/Outcomes (Bathing Goal 1, OT) goal not met  -LW goal not met  -        Dressing Goal 1 (OT)    Activity/Assistive Device (Dressing Goal 1, OT) dressing skills, all  -LW dressing skills, all  -BH     [place]/Cues Needed (Dressing Goal 1, OT) minimum assist (75% or more patient effort)  -LW minimum assist (75% or more patient effort)  -     Time Frame (Dressing Goal 1, OT) long term goal (LTG);by discharge  -LW long term goal (LTG);by discharge  -     Progress/Outcome (Dressing Goal 1, OT) goal not met  -LW goal not met  -        Toileting Goal 1 (OT)    Activity/Device (Toileting Goal 1, OT) toileting skills, all  -LW toileting skills, all  -     [place] Level/Cues Needed (Toileting Goal 1, OT) minimum assist (75% or more patient effort)  -LW minimum assist (75% or more patient effort)  -     Time Frame (Toileting Goal 1, OT) long term goal (LTG);by discharge  -LW long term goal (LTG);by discharge  -     Progress/Outcome (Toileting Goal 1, OT) goal not met  -LW goal not met  -        Grooming Goal 1 (OT)    Activity/Device (Grooming Goal 1, OT) grooming skills, all  -LW grooming skills, all  -BH     [place] (Grooming Goal 1, OT) set-up required;standby assist;verbal cues required;tactile cues required  -LW set-up required;standby assist;verbal  cues required;tactile cues required  -     Time Frame (Grooming Goal 1, OT) long term goal (LTG);by discharge  - long term goal (LTG);by discharge  -     Progress/Outcome (Grooming Goal 1, OT) goal not met  - goal not met  -       User Key  (r) = Recorded By, (t) = Taken By, (c) = Cosigned By    Initials Name Provider Type Discipline     Liyah Mcnair, OTR/L Occupational Therapist OT    Alexa Baumann, MONSIVAIS/L Occupational Therapy Assistant OT        Occupational Therapy Education     Title: PT OT SLP Therapies (Active)     Topic: Occupational Therapy (Active)     Point: ADL training (Active)     Description: Instruct learner(s) on proper safety adaptation and remediation techniques during self care or transfers.   Instruct in proper use of assistive devices.   Learning Progress Summary     Learner Status Readiness Method Response Comment Documented by    Patient Done Acceptance E,DANIS SCHULZ   10/16/18 1208     Active Acceptance E NR Educated about OT and POC. Educated on safety throughout including hand placement for t/f. Educated on need for 24/7 care. Educated how to use the gait belt and when to family.  10/16/18 1105    Family Active Acceptance E NR Educated about OT and POC. Educated on safety throughout including hand placement for t/f. Educated on need for 24/7 care. Educated how to use the gait belt and when to family.  10/16/18 1105          Point: Home exercise program (Done)     Description: Instruct learner(s) on appropriate technique for monitoring, assisting and/or progressing therapeutic exercises/activities.   Learning Progress Summary     Learner Status Readiness Method Response Comment Documented by    Patient Done Acceptance E,DANIS SCHULZ   10/16/18 1208          Point: Precautions (Active)     Description: Instruct learner(s) on prescribed precautions during self-care and functional transfers.   Learning Progress Summary     Learner Status Readiness Method Response Comment  Documented by    Patient Done Acceptance E,DANIS VU   10/16/18 1208     Active Acceptance E NR Educated about OT and POC. Educated on safety throughout including hand placement for t/f. Educated on need for 24/7 care. Educated how to use the gait belt and when to family.  10/16/18 1105    Family Active Acceptance E NR Educated about OT and POC. Educated on safety throughout including hand placement for t/f. Educated on need for 24/7 care. Educated how to use the gait belt and when to family.  10/16/18 1105          Point: Body mechanics (Done)     Description: Instruct learner(s) on proper positioning and spine alignment during self-care, functional mobility activities and/or exercises.   Learning Progress Summary     Learner Status Readiness Method Response Comment Documented by    Patient Done Acceptance E,DANIS [facility] 10/16/18 1208                      User Key     Initials Effective Dates Name Provider Type Discipline     06/08/18 -  Liyah Mcnair, OTR/L Occupational Therapist OT     03/07/18 -  Alexa Desir, MONSIVAIS/L Occupational Therapy Assistant OT                OT Recommendation and Plan  Outcome Summary/Treatment Plan (OT)  Daily Summary of Progress (OT): progress toward functional goals is good  Plan for Continued Treatment (OT): Continue POC  Anticipated Discharge Disposition (OT): home with 24/7 care, home with home health, skilled nursing facility  Therapy Frequency (OT Eval): other (see comments) (5-7 days per week)  Daily Summary of Progress (OT): progress toward functional goals is good  Plan of Care Review  Plan of Care Reviewed With: patient  Plan of Care Reviewed With: patient  Outcome Summary: Pt worked on grooming with supervision and set up. Pt also worked on UB strengthening with t-band to improve independence with ADL's        Outcome Measures     Row Name 10/16/18 1000 10/16/18 0832          How much help from another is currently needed...    Putting on and taking off regular lower  body clothing? 2  -LW 2  -     Bathing (including washing, rinsing, and drying) 2  -LW 2  -BH     Toileting (which includes using toilet bed pan or urinal) 2  -LW 2  -BH     Putting on and taking off regular upper body clothing 2  -LW 2  -     Taking care of personal grooming (such as brushing teeth) 3  -LW 3  -BH     Eating meals 3  -LW 3  -     Score 14  -LW 14  -        Functional Assessment    Outcome Measure Options  -- AM-PAC 6 Clicks Daily Activity (OT)  -       User Key  (r) = Recorded By, (t) = Taken By, (c) = Cosigned By    Initials Name Provider Type     Liyah Mcnair, OTR/L Occupational Therapist    Alexa Baumann COTA/L Occupational Therapy Assistant           Time Calculation:         Time Calculation- OT     Row Name 10/16/18 1210 10/16/18 1110          Time Calculation- OT    OT Start Time 1000  -LW 0841  -     OT Stop Time 1040  -LW 0939  -     OT Time Calculation (min) 40 min  -LW 58 min  -     Total Timed Code Minutes- OT 40 minute(s)  - 15 minute(s)  -     OT Received On 10/16/18  -LW 10/16/18  -     OT Goal Re-Cert Due Date  -- 10/29/18  -       User Key  (r) = Recorded By, (t) = Taken By, (c) = Cosigned By    Initials Name Provider Type     Liyah Mcnair OTR/L Occupational Therapist    Alexa Baumann COTA/L Occupational Therapy Assistant           Therapy Suggested Charges     Code   Minutes Charges    None           Therapy Charges for Today     Code Description Service Date Service Provider Modifiers Qty    75582365834  OT SELF CARE/MGMT/TRAIN EA 15 MIN 10/16/2018 Alexa Desir COTA/L GO 2    [id number]  OT THER PROC EA 15 MIN 10/16/2018 Alexa Desir COTA/L GO 1      Non-skid socks and gait belt in place. Toileting offered. Call light and needs within reach. Pt advised to not get up alone and call the nurse for assistance.  Bed alarm on.     OT G-codes  OT Professional Judgement Used?: Yes  OT Functional Scales Options: AM-PAC 6  Clicks Daily Activity (OT)  Score: 14  Functional Limitation: Self care  Self Care Current Status (): At least 60 percent but less than 80 percent impaired, limited or restricted  Self Care Goal Status (): At least 40 percent but less than 60 percent impaired, limited or restricted    ROEL Suárez  10/16/2018    Electronically signed by Alexa Desir COTA/L at 10/16/2018 12:11 PM          Speech Language Pathology Notes (most recent note)      Marissa Aparicio MS CCC-SLP at 10/15/2018  1:49 PM          Acute Care - Speech Language Pathology   Swallow Treatment Note/Discharge   [facility]     Patient Name: Mariaa Pires  : 4/10/1926  MRN: [id number]  Today's Date: 10/15/2018               Admit Date: 10/13/2018     Goal:  Patient will safely tolerate least restricted diet w/no overt s/s of aspiration for adequate nutrition and hydration:  Pt seen for dysphagia therapy this date. Pt repositioned herself to 90 degrees to eat. SLP reviewed safe swallowing strategies that were given upon eval. Pt stated that the strategies have been helping. Pt safely tolerated regular solids/thin liqids via straw w/no overt s/s of aspiration. SLP to d/c pt on current diet w/safe swallowing strategies.    Visit Dx:      ICD-10-CM ICD-9-CM   1. Acute on chronic respiratory failure with hypoxia (CMS/Formerly [facility]) J96.21 518.84     799.02   2. Pneumonia due to infectious organism, unspecified laterality, unspecified part of lung J18.9 136.9     484.8   3. NSTEMI (non-ST elevated myocardial infarction) (CMS/Formerly [facility]) I21.4 410.70   4. Oropharyngeal dysphagia R13.12 787.22     Patient Active Problem List   Diagnosis   • Pneumonia   • Bilateral pleural effusion   • Osteoporosis   • Acute respiratory failure with hypoxia and hypercapnia (CMS/[facility])   • Diabetes mellitus (CMS/[facility])   • Hypertension       Therapy Treatment        Rehabilitation Treatment Summary     Row Name 10/15/18 0728             Treatment  Time/Intention    Discipline speech language pathologist  -CK      Document Type discharge treatment  -CK2      Subjective Information no complaints  -CK      Mode of Treatment individual therapy;speech-language pathology  -CK      Patient/Family Observations Pt alert sitting up in bed; no family present  -CK      Care Plan Review care plan/treatment goals reviewed;risks/benefits reviewed;current/potential barriers reviewed;patient/other agree to care plan  -CK2      Total Evaluation Minutes, SLP 32  -CK2      Patient Effort good  -CK3      Recorded by [CK] Marissa Aparicio MS CCC-SLP 10/15/18 0736  [CK2] Marissa Aparicio, MS CCC-SLP 10/15/18 1332  [CK3] Marissa Aapricio, MS CCC-SLP 10/15/18 0754      Row Name 10/15/18 0728             Positioning and Restraints    Pre-Treatment Position in bed  -CK      Post Treatment Position bed  -CK2      In Bed sitting;call light within reach;encouraged to call for assist  -CK2      Recorded by [CK] Marissa Aparicio MS CCC-SLP 10/15/18 0736  [CK2] Marissa Aparicio MS CCC-SLP 10/15/18 1332      Row Name 10/15/18 0728             Pain Scale: Numbers Pre/Post-Treatment    Pain Scale: Numbers, Pretreatment 0/10 - no pain  -CK      Pain Scale: Numbers, Post-Treatment 0/10 - no pain  -CK2      Recorded by [CK] Marissa Aparicio MS CCC-SLP 10/15/18 0736  [CK2] Marissa Aparicio MS CCC-SLP 10/15/18 1332      Row Name 10/15/18 0728             Outcome Summary/Treatment Plan (SLP)    Daily Summary of Progress (SLP) prepare for discharge  -CK      Plan for Continued Treatment (SLP) d/c from skilled  services  -CK      Anticipated Dischage Disposition unknown  -CK      Reason for Discharge all goals and outcomes met, no further needs identified  -CK      Recorded by [CK] Marissa Aparicio MS CCC-SLP 10/15/18 1332        User Key  (r) = Recorded By, (t) = Taken By, (c) = Cosigned By    Initials Name Effective Dates Discipline    CK Marissa Aparicio, MS CCC-SLP  04/03/18 -  SLP        Outcome Summary  Outcome Summary/Treatment Plan (SLP)  Daily Summary of Progress (SLP): prepare for discharge (10/15/18 0728 : Marissa Aparicio, MS CCC-SLP)  Plan for Continued Treatment (SLP): d/c from [facility] services (10/15/18 0728 : Marissa Aparicio, MS CCC-SLP)  Anticipated Dischage Disposition: unknown (10/15/18 1325 : Marissa Aparicio, MS CCC-SLP)  Reason for Discharge: all goals and outcomes met, no further needs identified (10/15/18 1325 : Marissa Aparicio, MS [facility]-SLP)        SLP GOALS     Row Name 10/15/18 0728 10/14/18 1030          Oral Nutrition/Hydration Goal 1 (SLP)    Oral Nutrition/Hydration Goal 1, SLP pt to tolerate highest level diet for safe and adequate nutrition/hydration  -CK pt to tolerate highest level diet for safe and adequate nutrition/hydration  -EC     Time Frame (Oral Nutrition/Hydration Goal 1, SLP) by discharge  -CK by discharge  -EC     Barriers (Oral Nutrition/Hydration Goal 1, SLP) none  -CK none  -EC     Progress/Outcomes (Oral Nutrition/Hydration Goal 1, SLP) goal met  -CK other (see comments)   new goal  -EC       User Key  (r) = Recorded By, (t) = Taken By, (c) = Cosigned By    Initials Name Provider Type    Amarilis Euceda CCC-SLP Speech and Language Pathologist    CK Marissa Aparicio, MS CCC-SLP Speech and Language Pathologist          EDUCATION  The patient has been educated in the following areas:   Dysphagia (Swallowing Impairment).    SLP Recommendation and Plan                    Anticipated Dischage Disposition: unknown                Daily Summary of Progress (SLP): prepare for discharge  Plan for Continued Treatment (SLP): d/c from [facility] services  Plan of Care Reviewed With: patient  Progress: improving  Plan of Care Reviewed With: patient           Time Calculation:         Time Calculation- SLP     Row Name 10/15/18 1324             Time Calculation- SLP    SLP Start Time 0728  -CK      SLP Stop Time 0800  -CK       SLP Time Calculation (min) 32 min  -CK      Total Timed Code Minutes- SLP 32 minute(s)  -CK      SLP Received On 10/15/18  -CK        User Key  (r) = Recorded By, (t) = Taken By, (c) = Cosigned By    Initials Name Provider Type    Marissa Tam MS CCC-SLP Speech and Language Pathologist          Therapy Charges for Today     Code Description Service Date Service Provider Modifiers Qty    20565179175 HC ST SWALLOWING DISCHARGE 10/15/2018 Marissa Aparicio MS CCC-SLP GN, CI 1    [id number] HC ST TREATMENT SWALLOW 2 10/15/2018 Marissa Aparicio MS CCC-SLP GN 1          SLP G-Codes  Functional Limitations: Swallowing  Swallow Current Status (): At least 20 percent but less than 40 percent impaired, limited or restricted  Swallow Goal Status (): At least 1 percent but less than 20 percent impaired, limited or restricted  Swallow Discharge Status (): At least 1 percent but less than 20 percent impaired, limited or restricted    SLP Discharge Summary  Anticipated Dischage Disposition: unknown  Reason for Discharge: all goals and outcomes met, no further needs identified  Progress Toward Achieving Short/long Term Goals: all goals met within established timelines    Marissa Aparicio MS CCC-SLP  10/15/2018    Electronically signed by Marissa Aparicio MS CCC-SLP at 10/15/2018  1:50 PM         Referral for admission  Zhanna Burch RN,   [phone]

## 2018-10-17 NOTE — NURSING NOTE
Patient c/o chest pain at 2156. STAT EKG ordered per protocol. EKG results read. Hospitalist on call paged at [phone] STAT. See new orders. Hospitalist en route.

## 2018-10-17 NOTE — PLAN OF CARE
Problem: Patient Care Overview  Goal: Plan of Care Review  Outcome: Ongoing (interventions implemented as appropriate)   10/17/18 1345   Coping/Psychosocial   Plan of Care Reviewed With patient   Plan of Care Review   Progress improving   OTHER   Outcome Summary Pt. able to amb. 150ft with CGA +1 to follow with transport chair for safety. Pt. transferred with [facility] sit-stand-sit this p.m. Pt. with improved ablity & mobility today vs. PT eval yesterday. Cont. gt, therex, transfers      Goal: Discharge Needs Assessment  Outcome: Ongoing (interventions implemented as appropriate)   10/14/18 0209 10/15/18 1536   Discharge Needs Assessment   Readmission Within the Last 30 Days --  no previous admission in last 30 days   Concerns to be Addressed --  discharge planning;home safety   Patient/Family Anticipates Transition to --  home with help/services;home with family   Patient/Family Anticipated Services at Transition --  home health care   Transportation Concerns --  car, none   Transportation Anticipated --  family or friend will provide   Outpatient/Agency/Support Group Needs --  homecare agency   Discharge Facility/Level of Care Needs --  home with home health   Offered/Gave Vendor List --  no   Patient's Choice of Community Agency(s) --  pt active with [facility]   Current Discharge Risk --  lives alone   Discharge Coordination/Progress --  family plans for pt to have sitters during the evenings and night. the family all llive around her and they will be dropping in and out during the day. pt is active with caretenders and would like them upon dc    Disability   Equipment Currently Used at Home wheelchair;walker, standard;shower chair;oxygen;grab bar;ramp;commode --

## 2018-10-18 NOTE — PLAN OF CARE
Problem: Patient Care Overview  Goal: Plan of Care Review  Outcome: Ongoing (interventions implemented as appropriate)   10/18/18 1511   Coping/Psychosocial   Plan of Care Reviewed With patient   Plan of Care Review   Progress improving   OTHER   Outcome Summary Pt performed 10 x UB strengthening with yellow t-band to increase independence with ADL's

## 2018-10-18 NOTE — PROGRESS NOTES
[facility] Medicine Services  INPATIENT PROGRESS NOTE    Length of Stay: 4  Date of Admission: 10/13/2018  Primary Care Physician: Breezy Mcfarland MD    Subjective   Chief Complaint:  Shortness of breath  HPI:  Patient states that her shortness of breath is much better than yesterday.  She ambulated to the restroom today.  Review of Systems   Constitutional: Positive for fatigue. Negative for appetite change, chills, fever and unexpected weight change.   Respiratory: Positive for shortness of breath. Negative for cough, choking, chest tightness and wheezing.    Cardiovascular: Negative for chest pain, palpitations and leg swelling.   Gastrointestinal: Negative for abdominal pain, blood in stool, constipation, diarrhea, nausea and vomiting.   Genitourinary: Negative for dysuria, flank pain and hematuria.   Neurological: Positive for weakness. Negative for dizziness, seizures, syncope, speech difficulty, light-headedness, numbness and headaches.   Hematological: Does not bruise/bleed easily.   Psychiatric/Behavioral: Positive for confusion.        All pertinent negatives and positives are as above. All other systems have been reviewed and are negative unless otherwise stated.     Objective    Temp:  [96 °F (35.6 °C)-97.8 °F (36.6 °C)] 96 °F (35.6 °C)  Heart Rate:  [75-97] 82  Resp:  [16-20] 18  BP: (130-164)/(62-71) 162/71    Physical Exam   Constitutional: She appears well-developed and well-nourished.   HENT:   Head: Normocephalic and atraumatic.   Eyes: Pupils are equal, round, and reactive to light. EOM are normal.   Neck: Normal range of motion. Neck supple.   Cardiovascular: Normal rate.  An irregularly irregular rhythm present. Exam reveals no gallop and no friction rub.    Murmur heard.   Systolic murmur is present with a grade of 2/6   Pulmonary/Chest: Effort normal and breath sounds normal. No respiratory distress. She has no wheezes. She has no rales. She exhibits  no tenderness.   Abdominal: Soft. Bowel sounds are normal. She exhibits no distension. There is no tenderness. There is no guarding.   Musculoskeletal: She exhibits edema.   Skin: Skin is warm and dry.   Psychiatric: She has a normal mood and affect. Her behavior is normal. Thought content normal.   Vitals reviewed.          Results Review:  I have reviewed the labs, radiology results, and diagnostic studies.    Laboratory Data:     Results from last 7 days  Lab Units 10/16/18  2239 10/14/18  0630 10/13/18  2309   SODIUM mmol/L 137 136* 132*   POTASSIUM mmol/L 3.5 4.1 5.0   CHLORIDE mmol/L 97 97 94*   CO2 mmol/L 35.0* 29.0 26.0   BUN mg/dL 20 28* 31*   CREATININE mg/dL 0.91 0.96 1.08*   GLUCOSE mg/dL 172* 138* 213*   CALCIUM mg/dL 8.8 9.3 9.4   BILIRUBIN mg/dL  --  0.7 0.8   ALK PHOS U/L  --  154* 166*   ALT (SGPT) U/L  --  60* 68*   AST (SGOT) U/L  --  51* 56*   ANION GAP mmol/L 5.0 10.0 12.0     Estimated Creatinine Clearance: 31.1 mL/min (by C-G formula based on SCr of 0.91 mg/dL).    Results from last 7 days  Lab Units 10/17/18  1819 10/16/18  2239   MAGNESIUM mg/dL 2.7* 1.5*           Results from last 7 days  Lab Units 10/14/18  0630 10/13/18  2309   WBC 10*3/mm3 9.05 11.37*   HEMOGLOBIN g/dL 12.0 12.6   HEMATOCRIT % 37.1 38.3   PLATELETS 10*3/mm3 286 333       Results from last 7 days  Lab Units 10/13/18  2309   INR  1.04       Culture Data:   No results found for: BLOODCX  No results found for: URINECX  No results found for: RESPCX  No results found for: WOUNDCX  No results found for: STOOLCX  No components found for: BODYFLD    Radiology Data:   Imaging Results (last 24 hours)     ** No results found for the last 24 hours. **          I have reviewed the patient's current medications.     Assessment/Plan     Active Hospital Problems    Diagnosis   • Pulmonary hypertension (CMS/HCC)   • Aortic valve stenosis   • Bilateral pleural effusion   • Osteoporosis   • Acute respiratory failure with hypoxia and  hypercapnia (CMS/HCC)   • Diabetes mellitus (CMS/HCC)   • Hypertension       Plan:    1.  Severe pulmonary hypertension:  Discussed with patient and family the likelihood that symptom mitigation would be our best and only option.  She sees Dr. Valadez routinely.  Recently Dr. Valadez saw her and recommended no change.  Current echo shows severe tricuspid regurgitation.  Likely this is the cause of her PH.  She is not a candidate for invasive testing or therapy.  Continue current treatment for now.  2.  Acute respiratory failure with hypoxia and hypercapnia:  Continue current treatment.  Symptomatically better.  3.  Systemic hypertension  4.  DM   5.  Atrial fibrillation:  Per Dr Valadez's notes, she has a history of atrial fibrillation.  Patient and her son both state that they are unaware of this as a previous diagnosis.  At this point, she is not overtly symptomatic so I recommend no increase in treatment.  I discussed this with patient and family and they agree.    Patient feels much better today.  Shortness of breath is improving.  She was able to ambulate to the bathroom today.  Plan for transfer to SNF when accepted.  Discussed with patient and family.        Discharge Planning: I expect patient to be discharged to SNF in 3-4 days.        This document has been electronically signed by Galen Blunt MD on October 18, 2018 2:00 PM

## 2018-10-18 NOTE — PLAN OF CARE
Problem: Fall Risk (Adult)  Goal: Absence of Fall  Outcome: Ongoing (interventions implemented as appropriate)      Problem: Patient Care Overview  Goal: Plan of Care Review  Outcome: Ongoing (interventions implemented as appropriate)   10/17/18 7864   Coping/Psychosocial   Plan of Care Reviewed With patient     Goal: Individualization and Mutuality  Outcome: Ongoing (interventions implemented as appropriate)    Goal: Interprofessional Rounds/Family Conf  Outcome: Ongoing (interventions implemented as appropriate)      Problem: Skin Injury Risk (Adult)  Goal: Skin Health and Integrity  Outcome: Ongoing (interventions implemented as appropriate)      Problem: Pneumonia (Adult)  Goal: Signs and Symptoms of Listed Potential Problems Will be Absent, Minimized or Managed (Pneumonia)  Outcome: Ongoing (interventions implemented as appropriate)      Problem: Breathing Pattern Ineffective (Adult)  Goal: Effective Oxygenation/Ventilation  Outcome: Ongoing (interventions implemented as appropriate)    Goal: Anxiety/Fear Reduction  Outcome: Ongoing (interventions implemented as appropriate)

## 2018-10-18 NOTE — THERAPY TREATMENT NOTE
Acute Care - Occupational Therapy Treatment Note  [facility]     Patient Name: Mariaa Pires  : 4/10/1926  MRN: [id number]  Today's Date: 10/18/2018  Onset of Illness/Injury or Date of Surgery: 10/13/18  Date of Referral to OT: 10/15/18  Referring Physician: Dr. North    Admit Date: 10/13/2018       ICD-10-CM ICD-9-CM   1. Acute on chronic respiratory failure with hypoxia (CMS/[facility]) J96.21 518.84     799.02   2. Pneumonia due to infectious organism, unspecified laterality, unspecified part of lung J18.9 136.9     484.8   3. NSTEMI (non-ST elevated myocardial infarction) (CMS/[facility]) I21.4 410.70   4. Oropharyngeal dysphagia R13.12 787.22   5. Impaired mobility and ADLs Z74.09 799.89   6. Impaired functional mobility, balance, gait, and endurance Z74.09 V49.89     Patient Active Problem List   Diagnosis   • Pneumonia   • Bilateral pleural effusion   • Osteoporosis   • Acute respiratory failure with hypoxia and hypercapnia (CMS/[facility])   • Diabetes mellitus (CMS/[facility])   • Hypertension   • Pulmonary hypertension (CMS/[facility])   • Aortic valve stenosis     Past Medical History:   Diagnosis Date   • Diabetes mellitus (CMS/HCC) 10/14/2018   • Hypertension    • Osteoporosis 10/14/2018     History reviewed. No pertinent surgical history.    Therapy Treatment          Rehabilitation Treatment Summary     Row Name 10/18/18 1335 10/18/18 1040          Treatment Time/Intention    Discipline occupational therapy assistant  -LW physical therapist  -CW     Document Type therapy note (daily note)  -LW therapy note (daily note)  -CW     Subjective Information no complaints  -LW no complaints  -CW     Mode of Treatment occupational therapy  -LW individual therapy;physical therapy  -CW     Patient/Family Observations Daughter in law in room  -LW Daugher-in-laws present  -CW     Care Plan Review care plan/treatment goals reviewed  -LW patient/other agree to care plan  -CW     Total Minutes, Physical Therapy Treatment  -- 34  -CW2      Therapy Frequency (PT Clinical Impression)  -- other (see comments)   5-7x/week  -CW2     Total Minutes, Occupational Therapy Treatment 25  -LW  --     Therapy Frequency (OT Eval) other (see comments)   5-7 times per week  -LW  --     Patient Effort good  -LW  --     Existing Precautions/Restrictions fall;oxygen therapy device and L/min  -LW fall;oxygen therapy device and L/min  -CW2     Equipment Issued to Patient gait belt  -LW  --     Patient Response to Treatment  -- Pt tolerated tx well  -CW2     Recorded by [LW] Alexa Desir COTA/L 10/18/18 1509 [CW] Candida Biggs, PT 10/18/18 1350  [CW2] Candida Biggs, PT 10/18/18 1357     Row Name 10/18/18 1335 10/18/18 1040          Vital Signs    Pre Systolic BP Rehab 134  -  -CW     Pre Treatment Diastolic BP 66  -LW 60  -CW     Post Systolic BP Rehab  -- 148  -CW     Post Treatment Diastolic BP  -- 61  -CW     Pretreatment Heart Rate (beats/min) 96  -LW 94  -CW     Intratreatment Heart Rate (beats/min)  -- 110  -CW     Posttreatment Heart Rate (beats/min) 94  -LW 92  -CW     Pre SpO2 (%) 98  -LW 96  -CW     O2 Delivery Pre Treatment supplemental O2  -LW supplemental O2  -CW     Intra SpO2 (%)  -- 91  -CW     O2 Delivery Intra Treatment  -- supplemental O2  -CW     Post SpO2 (%) 96  -LW 94  -CW     O2 Delivery Post Treatment supplemental O2  -LW supplemental O2  -CW     Pre Patient Position Supine  -LW Supine  -CW     Intra Patient Position  -- Standing  -CW     Post Patient Position Supine   HOB elevated  -LW Supine  -CW     Rest Breaks   -- 2   during ambulation  -CW     Recorded by [LW] Alexa Desir MONSIVAIS/L 10/18/18 1509 [CW] Candida Biggs, PT 10/18/18 1357     Row Name 10/18/18 1335 10/18/18 1040          Cognitive Assessment/Intervention- PT/OT    Affect/Mental Status (Cognitive) WFL  -LW WFL  -CW     Orientation Status (Cognition) oriented x 4  -LW oriented x 4  -CW     Follows Commands (Cognition) follows one step commands  -LW follows  one step commands;75-90% accuracy;delayed response/completion;repetition of directions required  -CW     Safety Deficit (Cognitive) safety precautions awareness;ability to follow commands  -LW safety precautions follow-through/compliance;mild deficit  -CW     Recorded by [LW] Alexa Desir COTA/MANJU 10/18/18 1509 [CW] Candida Biggs, PT 10/18/18 1357     Row Name 10/18/18 1040             Bed Mobility Assessment/Treatment    Supine-Sit [place] (Bed Mobility) verbal cues;contact guard  -CW      Sit-Supine [place] (Bed Mobility) verbal cues;contact guard  -CW      Recorded by [CW] Candida Biggs, PT 10/18/18 1357      Row Name 10/18/18 1040             Sit-Stand Transfer    Sit-Stand [place] (Transfers) minimum assist (75% patient effort);verbal cues  -CW      Assistive Device (Sit-Stand Transfers) walker, front-wheeled  -CW      Recorded by [CW] Candida Biggs, PT 10/18/18 1357      Row Name 10/18/18 1040             Stand-Sit Transfer    Stand-Sit [place] (Transfers) contact guard  -CW      Assistive Device (Stand-Sit Transfers) walker, front-wheeled  -CW      Recorded by [CW] Candida Biggs, PT 10/18/18 1357      Row Name 10/18/18 1040             Toilet Transfer    Type (Toilet Transfer) sit-stand  -CW      [place] Level (Toilet Transfer) moderate assist (50% patient effort)  -CW      Assistive Device (Toilet Transfer) walker, front-wheeled  -CW      Recorded by [CW] Candida Biggs, PT 10/18/18 1357      Row Name 10/18/18 1040             Gait/Stairs Assessment/Training    85419 - Gait Training Minutes  19  -CW      [place] Level (Gait) minimum assist (75% patient effort);verbal cues  -CW      Assistive Device (Gait) walker, front-wheeled  -CW      Distance in Feet (Gait) 1x50 ft with 2 standing rest breaks   Pt stated she didn't need wheelchair follow  -CW      Deviations/Abnormal Patterns (Gait) base of support, wide;gait speed decreased;stride length decreased  -CW   "    Recorded by [CW] Candida Biggs, PT 10/18/18 1357      Row Name 10/18/18 1040             Motor Skills Assessment/Interventions    Additional Documentation Therapeutic Exercise Interventions (Group);Therapeutic Exercise (Group);Balance (Group)  -CW      Recorded by [CW] Candida Biggs, PT 10/18/18 1405      Row Name 10/18/18 1335 10/18/18 1040          Therapeutic Exercise    Lower Extremity (Therapeutic Exercise)  -- LAQ (long arc quad), bilateral;marching while standing;marching while seated  -CW     Weight/Resistance (Therapeutic Exercise) yellow  -LW  --     Exercise Type (Therapeutic Exercise) AROM (active range of motion)  -LW AROM (active range of motion)  -CW     Position (Therapeutic Exercise) seated   long sitting  -LW seated;standing  -CW     Sets/Reps (Therapeutic Exercise) 10  -LW 1x20  -CW     Equipment (Therapeutic Exercise) resistive bands  -LW  --     Expected Outcome (Therapeutic Exercise)  -- improve functional stability  -CW     Recorded by [LW] Alexa Desir, YODIT/MANJU 10/18/18 1509 [CW] Candida Biggs, PT 10/18/18 1405     Row Name 10/18/18 1040             Balance    Balance static standing balance  -CW      Recorded by [CW] Candida Biggs, PT 10/18/18 1405      Row Name 10/18/18 1040             Static Standing Balance    Level of [place] (Supported Standing, Static Balance) contact guard assist  -CW      Time Able to Maintain Position (Supported Standing, Static Balance) 30 to 45 seconds  -CW      Assistive Device Utilized (Supported Standing, Static Balance) rolling walker  -CW      Comment (Supported Standing, Static Balance) Narrow base of support, 3x30\"  -CW      Level of [place] (Unsupported Standing, Static Balance) contact guard assist  -CW      Time Able to Maintain Position (Unsupported Standing, Static Balance) 15 to 30 seconds  -CW      Comment (Unsupported Standing, Static Balance) narrow base of support 3x15\"  -CW      Recorded by [CW] Kalyan, " Candida, PT 10/18/18 1405      Row Name 10/18/18 1335 10/18/18 1040          Pain Scale: Numbers Pre/Post-Treatment    Pain Scale: Numbers, Pretreatment 0/10 - no pain  -LW 6/10  -CW     Pain Scale: Numbers, Post-Treatment 0/10 - no pain  -LW 6/10  -CW     Pain Location  -- throat  -CW     Pain Intervention(s)  -- Ambulation/increased activity   water  -CW     Recorded by [LW] Alexa Desir MONSIVAIS/L 10/18/18 1509 [CW] Candida Biggs, PT 10/18/18 1357     Row Name 10/18/18 1335             Sensory Assessment/Intervention    Sensory General Assessment no sensation deficits identified  -LW      Recorded by [LW] Alexa Desir MONSIVAIS/L 10/18/18 1509      Row Name 10/18/18 1335             Hearing Assessment    Hearing Status hearing impairment, bilaterally  -LW      Recorded by [LW] Alexa Desir MONSIVAIS/L 10/18/18 1509      Row Name 10/18/18 1335             Vision Assessment/Intervention    Visual Impairment/Limitations corrective lenses full time  -LW      Recorded by [LW] Alexa Desir MONSIVAIS/L 10/18/18 1509      Row Name 10/18/18 1335             Light Touch Sensation Assessment    Left Upper Extremity: Light Touch Sensation Assessment intact  -LW      Right Upper Extremity: Light Touch Sensation Assessment mild impairment, 75% or more correct responses  -LW      Recorded by [LW] Alexa Desir MONSIVAIS/L 10/18/18 1509      Row Name 10/18/18 1335             Coping    Observed Emotional State accepting;calm;cooperative  -LW      Verbalized Emotional State acceptance  -LW      Recorded by [LW] Alexa Desir MONSIVAIS/L 10/18/18 1509      Row Name 10/18/18 1335             Plan of Care Review    Plan of Care Reviewed With patient  -LW      Recorded by [LW] Alexa Desir MONSIVAIS/L 10/18/18 1509      Row Name 10/18/18 1335             Outcome Summary/Treatment Plan (OT)    Daily Summary of Progress (OT) progress toward functional goals is good  -LW      Plan for Continued Treatment (OT) Continue POC  -LW       Anticipated Discharge Disposition (OT) home with home health;home;skilled nursing facility  -LW      Recorded by [LW] Alexa Desir COTA/L 10/18/18 1509      Row Name 10/18/18 1040             Outcome Summary/Treatment Plan (PT)    Daily Summary of Progress (PT) progress toward functional goals is good  -CW      Plan for Continued Treatment (PT) continue  -CW      Anticipated Discharge Disposition (PT) skilled nursing facility;home with 24/7 care  -CW      Recorded by [CW] Candida Biggs, PT 10/18/18 1357        User Key  (r) = Recorded By, (t) = Taken By, (c) = Cosigned By    Initials Name Effective Dates Discipline    LW Alexa Desir COTA/L 03/07/18 -  OT    CW Candida Biggs, PT 10/04/18 -  PT                   OT Rehab Goals     Row Name 10/18/18 1335             Transfer Goal 1 (OT)    Activity/Assistive Device (Transfer Goal 1, OT) toilet  -LW      [place] Level/Cues Needed (Transfer Goal 1, OT) minimum assist (75% or more patient effort)  -LW      Time Frame (Transfer Goal 1, OT) long term goal (LTG);by discharge  -LW      Progress/Outcome (Transfer Goal 1, OT) goal not met  -LW         Bathing Goal 1 (OT)    Activity/Assistive Device (Bathing Goal 1, OT) bathing skills, all  -LW      [place] Level/Cues Needed (Bathing Goal 1, OT) minimum assist (75% or more patient effort)  -LW      Time Frame (Bathing Goal 1, OT) long term goal (LTG);by discharge  -LW      Progress/Outcomes (Bathing Goal 1, OT) goal not met  -LW         Dressing Goal 1 (OT)    Activity/Assistive Device (Dressing Goal 1, OT) dressing skills, all  -LW      [place]/Cues Needed (Dressing Goal 1, OT) minimum assist (75% or more patient effort)  -LW      Time Frame (Dressing Goal 1, OT) long term goal (LTG);by discharge  -LW      Progress/Outcome (Dressing Goal 1, OT) goal not met  -LW         Toileting Goal 1 (OT)    Activity/Device (Toileting Goal 1, OT) toileting skills, all  -LW      [place] Level/Cues  Needed (Toileting Goal 1, OT) minimum assist (75% or more patient effort)  -LW      Time Frame (Toileting Goal 1, OT) long term goal (LTG);by discharge  -LW      Progress/Outcome (Toileting Goal 1, OT) goal not met  -LW         Grooming Goal 1 (OT)    Activity/Device (Grooming Goal 1, OT) grooming skills, all  -LW      [place] (Grooming Goal 1, OT) set-up required;standby assist;verbal cues required;tactile cues required  -LW      Time Frame (Grooming Goal 1, OT) long term goal (LTG);by discharge  -LW      Progress/Outcome (Grooming Goal 1, OT) goal not met  -LW        User Key  (r) = Recorded By, (t) = Taken By, (c) = Cosigned By    Initials Name Provider Type Discipline    Alexa Baumann COTA/L Occupational Therapy Assistant OT        Occupational Therapy Education     Title: PT OT SLP Therapies (Active)     Topic: Occupational Therapy (Active)     Point: ADL training (Active)     Description: Instruct learner(s) on proper safety adaptation and remediation techniques during self care or transfers.   Instruct in proper use of assistive devices.   Learning Progress Summary     Learner Status Readiness Method Response Comment Documented by    Patient Done Acceptance E,DANIS SCHULZ   10/18/18 1510     Done Acceptance E,DANIS SCHULZ Educated pt on fall precautions  10/17/18 1514     Done Acceptance E,DANIS SCHULZ   10/16/18 1208     Active Acceptance E NR Educated about OT and POC. Educated on safety throughout including hand placement for t/f. Educated on need for 24/7 care. Educated how to use the gait belt and when to family.  10/16/18 1105    Family Active Acceptance E NR Educated about OT and POC. Educated on safety throughout including hand placement for t/f. Educated on need for 24/7 care. Educated how to use the gait belt and when to family.  10/16/18 1105          Point: Home exercise program (Done)     Description: Instruct learner(s) on appropriate technique for monitoring, assisting and/or progressing  therapeutic exercises/activities.   Learning Progress Summary     Learner Status Readiness Method Response Comment Documented by    Patient Done Acceptance E,TB VU  LW 10/18/18 1510     Done Acceptance E,TB VU Educated pt on fall precautions LW 10/17/18 1514     Done Acceptance E,TB VU  LW 10/16/18 1208          Point: Precautions (Active)     Description: Instruct learner(s) on prescribed precautions during self-care and functional transfers.   Learning Progress Summary     Learner Status Readiness Method Response Comment Documented by    Patient Done Acceptance E,TB VU  LW 10/18/18 1510     Done Acceptance E,TB VU Educated pt on fall precautions LW 10/17/18 1514     Done Acceptance E,TB VU  LW 10/16/18 1208     Active Acceptance E NR Educated about OT and POC. Educated on safety throughout including hand placement for t/f. Educated on need for 24/7 care. Educated how to use the gait belt and when to family.  10/16/18 1105    Family Active Acceptance E NR Educated about OT and POC. Educated on safety throughout including hand placement for t/f. Educated on need for 24/7 care. Educated how to use the gait belt and when to family.  10/16/18 1105          Point: Body mechanics (Done)     Description: Instruct learner(s) on proper positioning and spine alignment during self-care, functional mobility activities and/or exercises.   Learning Progress Summary     Learner Status Readiness Method Response Comment Documented by    Patient Done Acceptance E,TB VU  LW 10/18/18 1510     Done Acceptance E,TB VU Educated pt on fall precautions LW 10/17/18 1514     Done Acceptance E,TB VU   10/16/18 1208                      User Key     Initials Effective Dates Name Provider Type Discipline     06/08/18 -  Liyah Mcnair, OTR/L Occupational Therapist OT     03/07/18 -  Alexa Desir, MONSIVAIS/L Occupational Therapy Assistant OT                OT Recommendation and Plan  Outcome Summary/Treatment Plan (OT)  Daily Summary  of Progress (OT): progress toward functional goals is good  Plan for Continued Treatment (OT): Continue POC  Anticipated Discharge Disposition (OT): home with home health, home, skilled nursing facility  Therapy Frequency (OT Eval): other (see comments) (5-7 times per week)  Daily Summary of Progress (OT): progress toward functional goals is good  Plan of Care Review  Plan of Care Reviewed With: patient  Plan of Care Reviewed With: patient  Outcome Summary: Pt performed 10 x UB strengthening with yellow t-band to increase independence with ADL's        Outcome Measures     Row Name 10/18/18 1335 10/18/18 1044 10/17/18 1345       How much help from another person do you currently need...    Turning from your back to your side while in flat bed without using bedrails?  -- 4  -CW 3  -JA    Moving from lying on back to sitting on the side of a flat bed without bedrails?  -- 3  -CW 3  -JA    Moving to and from a bed to a chair (including a wheelchair)?  -- 3  -CW 3  -JA    Standing up from a chair using your arms (e.g., wheelchair, bedside chair)?  -- 3  -CW 3  -JA    Climbing 3-5 steps with a railing?  -- 2  -CW 2  -JA    To walk in hospital room?  -- 3  -CW 3  -JA    AM-PAC 6 Clicks Score  -- 18  -CW 17  -JA       How much help from another is currently needed...    Putting on and taking off regular lower body clothing? 2  -LW  --  --    Bathing (including washing, rinsing, and drying) 2  -LW  --  --    Toileting (which includes using toilet bed pan or urinal) 2  -LW  --  --    Putting on and taking off regular upper body clothing 2  -LW  --  --    Taking care of personal grooming (such as brushing teeth) 3  -LW  --  --    Eating meals 3  -LW  --  --    Score 14  -LW  --  --       Functional Assessment    Outcome Measure Options  -- AM-PAC 6 Clicks Basic Mobility (PT)  -CW AM-PAC 6 Clicks Basic Mobility (PT)  -JA    Row Name 10/17/18 1105 10/16/18 1000 10/16/18 0842       How much help from another person do you  currently need...    Turning from your back to your side while in flat bed without using bedrails?  --  -- 3  -CW    Moving from lying on back to sitting on the side of a flat bed without bedrails?  --  -- 3  -CW    Moving to and from a bed to a chair (including a wheelchair)?  --  -- 2  -CW    Standing up from a chair using your arms (e.g., wheelchair, bedside chair)?  --  -- 2  -CW    Climbing 3-5 steps with a railing?  --  -- 1  -CW    To walk in hospital room?  --  -- 2  -CW    AM-PAC 6 Clicks Score  --  -- 13  -CW       How much help from another is currently needed...    Putting on and taking off regular lower body clothing? 2  -LW 2  -LW  --    Bathing (including washing, rinsing, and drying) 2  -LW 2  -LW  --    Toileting (which includes using toilet bed pan or urinal) 2  -LW 2  -LW  --    Putting on and taking off regular upper body clothing 2  -LW 2  -LW  --    Taking care of personal grooming (such as brushing teeth) 3  -LW 3  -LW  --    Eating meals 3  -LW 3  -LW  --    Score 14  -LW 14  -LW  --       Functional Assessment    Outcome Measure Options  --  -- AM-PAC 6 Clicks Basic Mobility (PT)  -CW    Row Name 10/16/18 0841             How much help from another is currently needed...    Putting on and taking off regular lower body clothing? 2  -BH      Bathing (including washing, rinsing, and drying) 2  -BH      Toileting (which includes using toilet bed pan or urinal) 2  -BH      Putting on and taking off regular upper body clothing 2  -BH      Taking care of personal grooming (such as brushing teeth) 3  -BH      Eating meals 3  -      Score 14  -         Functional Assessment    Outcome Measure Options AM-PAC 6 Clicks Daily Activity (OT)  -        User Key  (r) = Recorded By, (t) = Taken By, (c) = Cosigned By    Initials Name Provider Type    Liyah Muñoz, OTR/L Occupational Therapist    Isac Coles, PTA Physical Therapy Assistant    Alexa Baumann, MONSIVAIS/L Occupational  Therapy Assistant    Candida Gonzalez, PT Physical Therapist           Time Calculation:         Time Calculation- OT     Row Name 10/18/18 1512 10/18/18 1350 10/18/18 1040       Time Calculation- OT    OT Start Time 1335  -LW  --  --    OT Stop Time 1400  -LW  --  --    OT Time Calculation (min) 25 min  -LW  --  --    Total Timed Code Minutes- OT 25 minute(s)  -LW  --  --    OT Received On 10/18/18  -LW  --  --       Timed Charges    94872 - Gait Training Minutes   -- 19  -CW 19  -CW      User Key  (r) = Recorded By, (t) = Taken By, (c) = Cosigned By    Initials Name Provider Type    Alexa Baumann COTA/L Occupational Therapy Assistant    Candida Gonzalez, PT Physical Therapist           Therapy Suggested Charges     Code   Minutes Charges    None           Therapy Charges for Today     Code Description Service Date Service Provider Modifiers Qty    86397356987 HC OT SELF CARE/MGMT/TRAIN EA 15 MIN 10/17/2018 Alexa Desir COTA/L GO 3    [id number] HC OT THER PROC EA 15 MIN 10/18/2018 Alexa Desir COTA/L GO 2      Non-skid socks and gait belt in place. Toileting offered. Call light and needs within reach. Pt advised to not get up alone and call the nurse for assistance.  Bed alarm on.       OT G-codes  OT Professional Judgement Used?: Yes  OT Functional Scales Options: AM-PAC 6 Clicks Daily Activity (OT)  Score: 14  Functional Limitation: Self care  Self Care Current Status (): At least 60 percent but less than 80 percent impaired, limited or restricted  Self Care Goal Status (): At least 40 percent but less than 60 percent impaired, limited or restricted    ROEL Suárez  10/18/2018

## 2018-10-18 NOTE — PLAN OF CARE
Problem: Patient Care Overview  Goal: Plan of Care Review  Outcome: Ongoing (interventions implemented as appropriate)   10/18/18 1400   Coping/Psychosocial   Plan of Care Reviewed With patient;family   Plan of Care Review   Progress improving   OTHER   Outcome Summary Pt was CGA for supine<>sit, and Josef for sit<>stand, min A for ambulation with FWW 1x60 ft with 2 rest breaks and O2 sats >91% throughout. Pt continues to improve with mobility, t/f, and ambulation. Pt would benefit from SNF or home with 24/7 care when d/c from hospital.

## 2018-10-18 NOTE — DISCHARGE PLACEMENT REQUEST
"Mariaa Livingston (89 y.o. Female)     Date of Birth Social Security Number Address Home Phone MRN    04/10/1926  [id number]  [facility] [address] [phone] [id number]    Sikhism Marital Status          Druze        Admission Date Admission Type Admitting Provider Attending Provider Department, Room/Bed    10/13/18 Emergency Terence Mast MD Ahmed, Farhan, MD [address], 429/1    Discharge Date Discharge Disposition Discharge Destination                       Attending Provider:  Terence Mast MD    Allergies:  Hydrocodone-acetaminophen, Prochlorperazine Edisylate    Isolation:  None   Infection:  None   Code Status:  No CPR    Ht:  160 cm (63\")   Wt:  50 kg (110 lb 3.2 oz)    Admission Cmt:  None   Principal Problem:  None                Active Insurance as of 10/13/2018     Primary Coverage     Payor Plan Insurance Group Employer/Plan Group    MEDICARE MEDICARE A & B      Payor Plan Address Payor Plan Phone Number Effective From Effective To    [address] [phone] 4/1/1991     [facility] [address]       Subscriber Name Subscriber Birth Date Member ID       MARIAA LIVINGSTON 4/10/1926 [id number]           Secondary Coverage     Payor Plan Insurance Group Employer/Plan Group    [facility] SUPP [id number]     Payor Plan Address Payor Plan Phone Number Effective From Effective To    [address]  12/1/2016     [facility] [address]       Subscriber Name Subscriber Birth Date Member ID       MARIAA LIVINGSTON 4/10/1926 [id number]                 Emergency Contacts      (Rel.) Home Phone Work Phone Mobile Phone    Breezy Livingston (Son) [phone] -- [phone]            Emergency Contact Information     Name Relation Home Work Mobile    Breezy Livingston Son [phone]          Insurance Information                MEDICARE/MEDICARE A & B Phone: [phone]    Subscriber: Mariaa Livingston Subscriber#: " [id number]    Group#:  Precert#:         LEE BLUE [facility]/LEE [facility] SUPP Phone:     Subscriber: Mariaa Pires Subscriber#: [id number]    Group#: [id number] Precert#:              History & Physical      Terence Mast MD at 10/14/2018  1:49 AM           [address], [place]. [address]  T - [phone]     H&P         SUBJECTIVE:   Patient Care Team:  Breezy Mcfarland MD as PCP - General    Chief Complaint:     Chief Complaint   Patient presents with   • Shoulder Pain   • Nausea   • Shortness of Breath       Patient is 89 y.o. female presents with past medical history of heart failure, presented to the ED with a complaint of having shortness of air.  Patient uses oxygen at nighttime only.  Most recent echo was done March 2017 shows EF of 55-60%.  On a chest x-ray the patient was found to have pneumonia.  Patient denies any other complain of this point..     HPI     ROS/HISTORY/ CURRENT MEDICATIONS/OBJECTIVE/VS/PE:   Review of Systems:   Review of Systems   Constitutional: Positive for activity change, appetite change and fatigue.   HENT: Negative for congestion and rhinorrhea.    Respiratory: Positive for chest tightness and shortness of breath. Negative for wheezing.    Cardiovascular: Negative for chest pain, palpitations and leg swelling.   Gastrointestinal: Positive for nausea. Negative for abdominal pain and constipation.   Genitourinary: Negative for dysuria and frequency.   Psychiatric/Behavioral: Negative for agitation.       History:   No past medical history on file.  No past surgical history on file.  No family history on file.  Social History   Substance Use Topics   • Smoking status: Not on file   • Smokeless tobacco: Not on file   • Alcohol use Not on file       (Not in a hospital admission)  Allergies:  Hydrocodone-acetaminophen and Prochlorperazine edisylate    Current Medications:     Current Facility-Administered Medications   Medication Dose  Route Frequency Provider Last Rate Last Dose   • ipratropium-albuterol (DUO-NEB) nebulizer solution 3 mL  3 mL Nebulization 4x Daily - RT Ramsey Rubio MD   3 mL at 10/13/18 2348   • ipratropium-albuterol (DUO-NEB) nebulizer solution 3 mL  3 mL Nebulization 4x Daily - RT Ramsey Rubio MD   3 mL at 10/14/18 0001   • sodium chloride 0.9 % flush 10 mL  10 mL Intravenous PRN Ramsey Rubio MD       • sodium chloride 0.9 % infusion  - ADS Override Pull              No current outpatient prescriptions on file.       Physical Exam:     Vital Sign Min/Max for last 24 hours  Temp  Min: 97.4 °F (36.3 °C)  Max: 98 °F (36.7 °C)   BP  Min: 173/72  Max: 240/105   Pulse  Min: 75  Max: 75   Resp  Min: 20  Max: 36   SpO2  Min: 95 %  Max: 99 %   Flow (L/min)  Min: 4  Max: 5   Weight  Min: 50.8 kg (112 lb)  Max: 50.8 kg (112 lb)   Body mass index is 19.84 kg/m².      Physical Exam:    Physical Exam   Constitutional: She appears well-developed. She appears cachectic.   Eyes: Pupils are equal, round, and reactive to light.   Cardiovascular: Normal rate, regular rhythm and normal heart sounds.    Pulmonary/Chest: She is in respiratory distress. She has wheezes. She has rales.   Currently on BiPAP   Abdominal: Soft. Bowel sounds are normal.   Musculoskeletal: Normal range of motion.   Neurological: She is alert.   Skin: Skin is warm.   Vitals reviewed.       Results Review:   Lab Results (last 24 hours)     Procedure Component Value Units Date/Time    Blood Culture - Blood, Blood, Venous Line [[id number]] Collected:  10/14/18 0112    Specimen:  Blood from Arm, Left Updated:  10/14/18 0118    [place] Draw [[id number]] Collected:  10/13/18 2309    Specimen:  Blood Updated:  10/14/18 0016    Narrative:       The following orders were created for panel order [place] Draw.  Procedure                               Abnormality         Status                     ---------                               -----------          ------                     Light Blue Top[[id number]]                                    Final result               Green Top (Gel)[[id number]]                                   Final result               Lavender Top[[id number]]                                      Final result               Gold Top - SST[[id number]]                                    Final result                 Please view results for these tests on the individual orders.    Light Blue Top [[id number]] Collected:  10/13/18 2309    Specimen:  Blood Updated:  10/14/18 0016     Extra Tube hold for add-on     Comment: Auto resulted       Green Top (Gel) [[id number]] Collected:  10/13/18 2309    Specimen:  Blood Updated:  10/14/18 0016     Extra Tube Hold for add-ons.     Comment: Auto resulted.       Lavender Top [[id number]] Collected:  10/13/18 2309    Specimen:  Blood Updated:  10/14/18 0016     Extra Tube hold for add-on     Comment: Auto resulted       Gold Top - SST [[id number]] Collected:  10/13/18 2309    Specimen:  Blood Updated:  10/14/18 0016     Extra Tube Hold for add-ons.     Comment: Auto resulted.       Blood Gas, Arterial [[id number]]  (Abnormal) Collected:  10/13/18 2348    Specimen:  Arterial Blood Updated:  10/13/18 2355     Site Right Radial     Damion's Test N/A     pH, Arterial 7.316 (L) pH units      pCO2, Arterial 52.2 (H) mm Hg      pO2, Arterial 115.0 (H) mm Hg      HCO3, Arterial 26.6 (H) mmol/L      Base Excess, Arterial -0.3 (L) mmol/L      O2 Saturation, Arterial 98.0 %      Barometric Pressure for Blood Gas 748 mmHg      Modality Nasal Cannula     Flow Rate 5.0 lpm      Ventilator Mode NA     Collected by lucinda patino    Protime-INR [[id number]]  (Normal) Collected:  10/13/18 2309    Specimen:  Blood Updated:  10/13/18 2339     Protime 13.4 Seconds      INR 1.04    Narrative:       Therapeutic range for most indications is 2.0-3.0 INR,  or 2.5-3.5 for mechanical heart valves.    BNP [[id number]]  (Abnormal) Collected:  10/13/18 2309     Specimen:  Blood Updated:  10/13/18 2337     proBNP 20,100.0 (H) pg/mL     Troponin [[id number]]  (Abnormal) Collected:  10/13/18 2309    Specimen:  Blood Updated:  10/13/18 2337     Troponin I 0.047 (H) ng/mL     Comprehensive Metabolic Panel [[id number]]  (Abnormal) Collected:  10/13/18 2309    Specimen:  Blood Updated:  10/13/18 2326     Glucose 213 (H) mg/dL      BUN 31 (H) mg/dL      Creatinine 1.08 (H) mg/dL      Sodium 132 (L) mmol/L      Potassium 5.0 mmol/L      Chloride 94 (L) mmol/L      CO2 26.0 mmol/L      Calcium 9.4 mg/dL      Total Protein 7.5 g/dL      Albumin 4.00 g/dL      ALT (SGPT) 68 (H) U/L      AST (SGOT) 56 (H) U/L      Alkaline Phosphatase 166 (H) U/L      Total Bilirubin 0.8 mg/dL      eGFR Non African Amer 47 mL/min/1.73      Globulin 3.5 gm/dL      A/G Ratio 1.1 g/dL      BUN/Creatinine Ratio 28.7 (H)     Anion Gap 12.0 mmol/L     Narrative:       The MDRD GFR formula is only valid for adults with stable renal function between ages 18 and 70.    CBC & Differential [[id number]] Collected:  10/13/18 2309    Specimen:  Blood Updated:  10/13/18 2319    Narrative:       The following orders were created for panel order CBC & Differential.  Procedure                               Abnormality         Status                     ---------                               -----------         ------                     CBC Auto Differential[[id number]]         Abnormal            Final result                 Please view results for these tests on the individual orders.    CBC Auto Differential [[id number]]  (Abnormal) Collected:  10/13/18 2309    Specimen:  Blood Updated:  10/13/18 2319     WBC 11.37 (H) 10*3/mm3      RBC 4.22 10*6/mm3      Hemoglobin 12.6 g/dL      Hematocrit 38.3 %      MCV 90.8 fL      MCH 29.9 pg      MCHC 32.9 g/dL      RDW 14.0 %      RDW-SD 45.9 fl      MPV 8.9 fL      Platelets 333 10*3/mm3      Neutrophil % 78.9 %      Lymphocyte % 12.1 %      Monocyte % 7.9 %      Eosinophil % 0.6 %       Basophil % 0.4 %      Immature Grans % 0.1 %      Neutrophils, Absolute 8.96 (H) 10*3/mm3      Lymphocytes, Absolute 1.38 10*3/mm3      Monocytes, Absolute 0.90 10*3/mm3      Eosinophils, Absolute 0.07 10*3/mm3      Basophils, Absolute 0.05 10*3/mm3      Immature Grans, Absolute 0.01 10*3/mm3      nRBC 0.0 /100 WBC               Imaging Results (last 24 hours)     Procedure Component Value Units Date/Time    XR Chest 1 View [[id number]] Collected:  10/13/18 2307     Updated:  10/13/18 2328    Narrative:         Chest single view on  10/13/2018     CLINICAL INDICATION: Chest pain    COMPARISON: None    FINDINGS: 2-lead left subclavian pacemaker is noted in place.  Borderline cardiomegaly is noted. There is mild scoliosis of the  spine. There is small left pleural effusion. There are bibasilar  opacities consistent with atelectasis and/or pneumonia. Mild  chronic interstitial changes are noted. Vascular calcification is  noted in the aorta.      Impression:       Small left pleural effusion with bibasilar  atelectasis and/or pneumonia.    Electronically signed by:  Zackery Marcano  10/13/2018 11:27 PM  CDT Workstation: [id number]           I reviewed the patient's new clinical results.  I reviewed the patient's new imaging results and agree with the interpretation.     ASSESSMENT/PLAN:   Assessment/Plan   Active Hospital Problems    Diagnosis Date Noted   • Acute on chronic respiratory failure with hypoxia (CMS/HCC) [J96.21] 10/14/2018   • Pneumonia [J18.9] 10/14/2018     1.  Acute on chronic hypoxic respiratory failure: Patient does not appear to be in volume overload, likely due to pneumonia.  Will treat pneumonia.  2.  Pneumonia: Patient will be started on IV antibiotic, will obtain cultures and follow-up sensitivity.    DVT ppx:scd    I discussed the patient's findings and my recommendations with patient, family and nursing staff.              This document has been electronically signed by Terence Mast  MD on October 14, 2018 1:49 AM                           Electronically signed by Terence Msat MD at 10/14/2018  1:53 AM             ICU Vital Signs     Row Name 10/18/18 0724 10/18/18 0315 10/18/18 0131 10/17/18 2339 10/17/18 2112       Vitals    Temp  -- 97.3 °F (36.3 °C)  -- 97.8 °F (36.6 °C)  --    Temp src  -- Temporal Artery   -- Temporal Artery   --    Pulse 75 86 78 97 88    Heart Rate Source Monitor Monitor Monitor Monitor Monitor    Resp  -- 18  -- 16 18    Resp Rate Source  -- Visual  -- Visual Visual    BP  -- 130/70  -- 145/66  --    Noninvasive MAP (mmHg)  --  --  -- 95  --    BP Location  -- Right arm  -- Left arm  --    BP Method  -- Automatic  -- Automatic  --    Patient Position  -- Lying  -- Lying  --       Oxygen Therapy    SpO2  -- 97 %  -- 97 % 96 %    Pulse Oximetry Type  -- Intermittent  -- Intermittent Intermittent    Device (Oxygen Therapy)  -- nasal cannula  -- nasal cannula humidified;nasal cannula    Flow (L/min)  --  --  -- 2 2    Row Name 10/17/18 2020 10/17/18 1958 10/17/18 1619 10/17/18 1617 10/17/18 1611       Vitals    Temp  -- 97.7 °F (36.5 °C)  --  --  --    Temp src  -- Oral  --  --  --    Pulse  -- 82 89 78 80    Heart Rate Source  -- Monitor  -- Monitor  --    Resp  -- 20  --  -- 20    Resp Rate Source  -- Visual  --  --  --    BP  -- 164/67  --  --  --    BP Location  -- Left arm  --  --  --    BP Method  -- Automatic  --  --  --    Patient Position  -- Lying  --  --  --       Oxygen Therapy    SpO2  -- 98 %  --  -- 98 %    Pulse Oximetry Type  -- Intermittent  --  --  --    Device (Oxygen Therapy) humidified;nasal cannula nasal cannula;humidified nasal cannula  -- nasal cannula    Flow (L/min) 2 2 2  -- 2    Row Name 10/17/18 1500 10/17/18 1255 10/17/18 1247 10/17/18 1133 10/17/18 0859       Vitals    Temp 96.5 °F (35.8 °C)  --  -- 96 °F (35.6 °C) 96.7 °F (35.9 °C)    Temp src Axillary  --  -- Axillary Axillary    Pulse 84 80 82 78 76    Heart Rate Source Monitor  --  --  Monitor Monitor    Resp 20  --  -- 18 18    Resp Rate Source Visual  --  -- Visual Visual    /62  --  -- 127/58 168/62    BP Location Left arm  --  -- Left arm Right arm    BP Method Automatic  --  -- Automatic Automatic    Patient Position Sitting  --  -- Sitting Sitting       Oxygen Therapy    SpO2 98 %  -- 98 % (!)  82 % 96 %    Pulse Oximetry Type  --  -- Intermittent  --  --    Device (Oxygen Therapy) nasal cannula  -- nasal cannula nasal cannula nasal cannula    Flow (L/min) 2 2 2 2  --        Hospital Medications (active)       Dose Frequency Start End    aspirin chewable tablet 81 mg 81 mg Daily 10/15/2018     Sig - Route: Chew 1 tablet Daily. - Oral    atorvastatin (LIPITOR) tablet 20 mg 20 mg 3 Times Weekly 10/15/2018     Sig - Route: Take 1 tablet by mouth Once per day on Mon Wed Fri. - Oral    digoxin (LANOXIN) tablet 125 mcg 125 mcg Daily Digoxin 10/15/2018     Sig - Route: Take 1 tablet by mouth Daily. - Oral    furosemide (LASIX) injection 20 mg 20 mg Every 12 Hours 10/15/2018     Sig - Route: Infuse 2 mL into a venous catheter Every 12 (Twelve) Hours. - Intravenous    ipratropium-albuterol (DUO-NEB) nebulizer solution 3 mL 3 mL 4 Times Daily - RT 10/14/2018     Sig - Route: Take 3 mL by nebulization 4 (Four) Times a Day. - Nebulization    levoFLOXacin (LEVAQUIN) tablet 250 mg 250 mg Every Other Day 10/16/2018 10/20/2018    Sig - Route: Take 1 tablet by mouth Every Other Day. - Oral    Cosign for Ordering: Accepted by Terence Mast MD on 10/16/2018 10:00 AM    levothyroxine (SYNTHROID, LEVOTHROID) tablet 37.5 mcg 37.5 mcg Every Early Morning 10/15/2018     Sig - Route: Take 1 half tablet by mouth Every Morning. - Oral    lisinopril (PRINIVIL,ZESTRIL) tablet 5 mg 5 mg Daily 10/15/2018     Sig - Route: Take 1 tablet by mouth Daily. - Oral    LORazepam (ATIVAN) injection 0.5 mg 0.5 mg Every 8 Hours PRN 10/14/2018 10/24/2018    Sig - Route: Infuse 0.25 mL into a venous catheter Every 8 (Eight)  "Hours As Needed for Anxiety. - Intravenous    Magnesium Sulfate 2 gram / 50mL Infusion (GIVE X 3 BAGS TO EQUAL 6GM TOTAL DOSE) - Mg 1.1 - 1.5 mg/dl 2 g As Needed 10/17/2018     Sig - Route: Infuse 50 mL into a venous catheter As Needed (See Administration Instructions). - Intravenous    Linked Group 1:  \"Or\" Linked Group Details        Magnesium Sulfate 2 gram Bolus, followed by 8 gram infusion (total Mg dose 10 grams)- Mg less than or equal to 1mg/dL 2 g As Needed 10/17/2018     Sig - Route: Infuse 50 mL into a venous catheter As Needed (See Administration Instructions). - Intravenous    Linked Group 1:  \"Or\" Linked Group Details        Magnesium Sulfate 4 gram infusion- Mg 1.6-1.9 mg/dL 4 g As Needed 10/17/2018     Sig - Route: Infuse 100 mL into a venous catheter As Needed (See Administration Instructions). - Intravenous    Linked Group 1:  \"Or\" Linked Group Details        meclizine (ANTIVERT) tablet 12.5 mg 12.5 mg 2 Times Daily 10/14/2018     Sig - Route: Take 1 tablet by mouth 2 (Two) Times a Day. - Oral    montelukast (SINGULAIR) tablet 10 mg 10 mg Nightly 10/14/2018     Sig - Route: Take 1 tablet by mouth Every Night. - Oral    nitroglycerin (NITROSTAT) SL tablet 0.4 mg 0.4 mg Every 5 Minutes PRN 10/16/2018     Sig - Route: Place 1 tablet under the tongue Every 5 (Five) Minutes As Needed for Chest Pain. - Sublingual    nystatin (MYCOSTATIN) [id number] UNIT/ML suspension 500,000 Units 5 mL 4 Times Daily 10/15/2018     Sig - Route: Swish and swallow 5 mL 4 (Four) Times a Day. - Swish & Swallow    pantoprazole (PROTONIX) EC tablet 40 mg 40 mg Every Morning 10/15/2018     Sig - Route: Take 1 tablet by mouth Every Morning. - Oral    sertraline (ZOLOFT) tablet 50 mg 50 mg Daily 10/15/2018     Sig - Route: Take 1 tablet by mouth Daily. - Oral    sodium chloride 0.9 % flush 3 mL 3 mL Every 12 Hours Scheduled 10/14/2018     Sig - Route: Infuse 3 mL into a venous catheter Every 12 (Twelve) Hours. - Intravenous    sodium " chloride 0.9 % flush 3-10 mL 3-10 mL As Needed 10/14/2018     Sig - Route: Infuse 3-10 mL into a venous catheter As Needed for Line Care. - Intravenous    sodium chloride 0.9 % infusion  - ADS Override Pull   10/14/2018     Notes to Pharmacy: TALHA CUELLAR: cabinet override             Physician Progress Notes (most recent note)      Galen Blunt MD at 10/17/2018 11:43 AM              [facility] Medicine Services  INPATIENT PROGRESS NOTE    Length of Stay: 3  Date of Admission: 10/13/2018  Primary Care Physician: Breezy Mcfarland MD    Subjective   Chief Complaint:  Shortness of breath  HPI:  Patient states that her shortness of breath is about the same as yesterday.  She states that she is no worse than yesterday, but doesn't feel any better.    Review of Systems   Constitutional: Positive for fatigue. Negative for appetite change, chills, fever and unexpected weight change.   Respiratory: Positive for shortness of breath. Negative for cough, choking, chest tightness and wheezing.    Cardiovascular: Negative for chest pain, palpitations and leg swelling.   Gastrointestinal: Negative for abdominal pain, blood in stool, constipation, diarrhea, nausea and vomiting.   Genitourinary: Negative for dysuria, flank pain and hematuria.   Neurological: Positive for weakness. Negative for dizziness, seizures, syncope, speech difficulty, light-headedness, numbness and headaches.   Hematological: Does not bruise/bleed easily.   Psychiatric/Behavioral: Positive for confusion.        All pertinent negatives and positives are as above. All other systems have been reviewed and are negative unless otherwise stated.     Objective    Temp:  [96 °F (35.6 °C)-98.1 °F (36.7 °C)] 96 °F (35.6 °C)  Heart Rate:  [70-95] 78  Resp:  [18-20] 18  BP: (127-170)/(58-72) 127/58    Physical Exam   Constitutional: She appears well-developed and well-nourished.   HENT:   Head: Normocephalic and atraumatic.    Eyes: Pupils are equal, round, and reactive to light. EOM are normal.   Neck: Normal range of motion. Neck supple.   Cardiovascular: Normal rate.  An irregularly irregular rhythm present. Exam reveals no gallop and no friction rub.    Murmur heard.   Systolic murmur is present with a grade of 2/6   Pulmonary/Chest: Effort normal and breath sounds normal. No respiratory distress. She has no wheezes. She has no rales. She exhibits no tenderness.   Abdominal: Soft. Bowel sounds are normal. She exhibits no distension. There is no tenderness. There is no guarding.   Musculoskeletal: She exhibits edema.   Skin: Skin is warm and dry.   Psychiatric: She has a normal mood and affect. Her behavior is normal. Thought content normal.   Vitals reviewed.          Results Review:  I have reviewed the labs, radiology results, and diagnostic studies.    Laboratory Data:     Results from last 7 days  Lab Units 10/16/18  2239 10/14/18  0630 10/13/18  2309   SODIUM mmol/L 137 136* 132*   POTASSIUM mmol/L 3.5 4.1 5.0   CHLORIDE mmol/L 97 97 94*   CO2 mmol/L 35.0* 29.0 26.0   BUN mg/dL 20 28* 31*   CREATININE mg/dL 0.91 0.96 1.08*   GLUCOSE mg/dL 172* 138* 213*   CALCIUM mg/dL 8.8 9.3 9.4   BILIRUBIN mg/dL  --  0.7 0.8   ALK PHOS U/L  --  154* 166*   ALT (SGPT) U/L  --  60* 68*   AST (SGOT) U/L  --  51* 56*   ANION GAP mmol/L 5.0 10.0 12.0     Estimated Creatinine Clearance: 31.1 mL/min (by C-G formula based on SCr of 0.91 mg/dL).    Results from last 7 days  Lab Units 10/16/18  2239   MAGNESIUM mg/dL 1.5*           Results from last 7 days  Lab Units 10/14/18  0630 10/13/18  2309   WBC 10*3/mm3 9.05 11.37*   HEMOGLOBIN g/dL 12.0 12.6   HEMATOCRIT % 37.1 38.3   PLATELETS 10*3/mm3 286 333       Results from last 7 days  Lab Units 10/13/18  2309   INR  1.04       Culture Data:   No results found for: BLOODCX  No results found for: URINECX  No results found for: RESPCX  No results found for: WOUNDCX  No results found for: STOOLCX  No  components found for: BODYFLD    Radiology Data:   Imaging Results (last 24 hours)     ** No results found for the last 24 hours. **          I have reviewed the patient's current medications.     Assessment/Plan     Active Hospital Problems    Diagnosis   • Pulmonary hypertension (CMS/HCC)   • Aortic valve stenosis   • Bilateral pleural effusion   • Osteoporosis   • Acute respiratory failure with hypoxia and hypercapnia (CMS/HCC)   • Diabetes mellitus (CMS/HCC)   • Hypertension       Plan:    1.  Severe pulmonary hypertension:  Discussed with patient and family the likelihood that symptom mitigation would be our best and only option.  She sees Dr. Valadez routinely.  Recently Dr. Valadez saw her and recommended no change.  Current echo shows severe tricuspid regurgitation.  Likely this is the cause of her PH.  She is not a candidate for invasive testing or therapy.  Continue current treatment for now.  2.  Acute respiratory failure with hypoxia and hypercapnia:  Continue current treatment.  Symptomatically better.  3.  Systemic hypertension  4.  DM   5.  Atrial fibrillation:  Per Dr Valadez's notes, she has a history of atrial fibrillation.  Patient and her son both state that they are unaware of this as a previous diagnosis.  At this point, she is not overtly symptomatic so I recommend no increase in treatment.  I discussed this with patient and family and they agree.    Patient was seen by palliative care yesterday.  They are receptive to those services.  Will continue current course for now.        Discharge Planning: I expect patient to be discharged to SNF in 3-4 days.        This document has been electronically signed by Galen Blunt MD on October 17, 2018 11:43 AM        Electronically signed by Galen Blunt MD at 10/17/2018 11:47 AM       Consult Notes (most recent note)     No notes of this type exist for this encounter.        Referral for Swing Bed  Zhanna Burch RN,   [phone]  office  [phone] cell    PT notes to follow

## 2018-10-18 NOTE — THERAPY TREATMENT NOTE
Acute Care - Physical Therapy Treatment Note  [facility]     Patient Name: Mariaa Pires  : 4/10/1926  MRN: [id number]  Today's Date: 10/18/2018  Onset of Illness/Injury or Date of Surgery: 10/13/18  Date of Referral to PT: 10/15/18  Referring Physician: Dr. North    Admit Date: 10/13/2018    Visit Dx:    ICD-10-CM ICD-9-CM   1. Acute on chronic respiratory failure with hypoxia (CMS/HCC) J96.21 518.84     799.02   2. Pneumonia due to infectious organism, unspecified laterality, unspecified part of lung J18.9 136.9     484.8   3. NSTEMI (non-ST elevated myocardial infarction) (CMS/[facility]) I21.4 410.70   4. Oropharyngeal dysphagia R13.12 787.22   5. Impaired mobility and ADLs Z74.09 799.89   6. Impaired functional mobility, balance, gait, and endurance Z74.09 V49.89     Patient Active Problem List   Diagnosis   • Pneumonia   • Bilateral pleural effusion   • Osteoporosis   • Acute respiratory failure with hypoxia and hypercapnia (CMS/[facility])   • Diabetes mellitus (CMS/[facility])   • Hypertension   • Pulmonary hypertension (CMS/[facility])   • Aortic valve stenosis       Therapy Treatment          Rehabilitation Treatment Summary     Row Name 10/18/18 1040             Treatment Time/Intention    Discipline physical therapist  -CW      Document Type therapy note (daily note)  -CW      Subjective Information no complaints  -CW      Mode of Treatment individual therapy;physical therapy  -CW      Patient/Family Observations Daugher-in-laws present  -CW      Care Plan Review patient/other agree to care plan  -CW      Total Minutes, Physical Therapy Treatment 34  -CW2      Therapy Frequency (PT Clinical Impression) other (see comments)   5-7x/week  -CW2      Existing Precautions/Restrictions fall;oxygen therapy device and L/min  -CW2      Patient Response to Treatment Pt tolerated tx well  -CW2      Recorded by [CW] Candida Biggs, PT 10/18/18 1350  [CW2] Candida Biggs, PT 10/18/18 1357      Row Name 10/18/18 8211              Vital Signs    Pre Systolic BP Rehab 137  -CW      Pre Treatment Diastolic BP 60  -CW      Post Systolic BP Rehab 148  -CW      Post Treatment Diastolic BP 61  -CW      Pretreatment Heart Rate (beats/min) 94  -CW      Intratreatment Heart Rate (beats/min) 110  -CW      Posttreatment Heart Rate (beats/min) 92  -CW      Pre SpO2 (%) 96  -CW      O2 Delivery Pre Treatment supplemental O2  -CW      Intra SpO2 (%) 91  -CW      O2 Delivery Intra Treatment supplemental O2  -CW      Post SpO2 (%) 94  -CW      O2 Delivery Post Treatment supplemental O2  -CW      Pre Patient Position Supine  -CW      Intra Patient Position Standing  -CW      Post Patient Position Supine  -CW      Rest Breaks  2   during ambulation  -CW      Recorded by [CW] Candida Biggs, PT 10/18/18 1357      Row Name 10/18/18 1040             Cognitive Assessment/Intervention- PT/OT    Affect/Mental Status (Cognitive) WFL  -CW      Orientation Status (Cognition) oriented x 4  -CW      Follows Commands (Cognition) follows one step commands;75-90% accuracy;delayed response/completion;repetition of directions required  -CW      Safety Deficit (Cognitive) safety precautions follow-through/compliance;mild deficit  -CW      Recorded by [CW] Candida Biggs, PT 10/18/18 1357      Row Name 10/18/18 1040             Bed Mobility Assessment/Treatment    Supine-Sit [place] (Bed Mobility) verbal cues;contact guard  -CW      Sit-Supine [place] (Bed Mobility) verbal cues;contact guard  -CW      Recorded by [CW] Candida Biggs, PT 10/18/18 1357      Row Name 10/18/18 1040             Sit-Stand Transfer    Sit-Stand [place] (Transfers) minimum assist (75% patient effort);verbal cues  -CW      Assistive Device (Sit-Stand Transfers) walker, front-wheeled  -CW      Recorded by [CW] Candida Biggs, PT 10/18/18 1357      Row Name 10/18/18 1040             Stand-Sit Transfer    Stand-Sit [place] (Transfers) contact guard  -CW      Assistive  Device (Stand-Sit Transfers) walker, front-wheeled  -CW      Recorded by [CW] Candida Biggs, PT 10/18/18 1357      Row Name 10/18/18 1040             Toilet Transfer    Type (Toilet Transfer) sit-stand  -CW      [place] Level (Toilet Transfer) moderate assist (50% patient effort)  -CW      Assistive Device (Toilet Transfer) walker, front-wheeled  -CW      Recorded by [CW] Candida Biggs, PT 10/18/18 1357      Row Name 10/18/18 1040             Gait/Stairs Assessment/Training    06200 - Gait Training Minutes  19  -CW      [place] Level (Gait) minimum assist (75% patient effort);verbal cues  -CW      Assistive Device (Gait) walker, front-wheeled  -CW      Distance in Feet (Gait) 1x50 ft with 2 standing rest breaks   Pt stated she didn't need wheelchair follow  -CW      Deviations/Abnormal Patterns (Gait) base of support, wide;gait speed decreased;stride length decreased  -CW      Recorded by [CW] Candida Biggs, PT 10/18/18 1357      Row Name 10/18/18 1040             Motor Skills Assessment/Interventions    Additional Documentation Therapeutic Exercise Interventions (Group);Therapeutic Exercise (Group);Balance (Group)  -CW      Recorded by [CW] Candida Biggs, PT 10/18/18 1405      Row Name 10/18/18 1040             Therapeutic Exercise    Lower Extremity (Therapeutic Exercise) LAQ (long arc quad), bilateral;marching while standing;marching while seated  -CW      Exercise Type (Therapeutic Exercise) AROM (active range of motion)  -CW      Position (Therapeutic Exercise) seated;standing  -CW      Sets/Reps (Therapeutic Exercise) 1x20  -CW      Expected Outcome (Therapeutic Exercise) improve functional stability  -CW      Recorded by [CW] Candida Biggs, PT 10/18/18 1405      Row Name 10/18/18 1040             Balance    Balance static standing balance  -CW      Recorded by [CW] Candida Biggs, PT 10/18/18 1405      Row Name 10/18/18 1040             Static Standing Balance    Level of  "[place] (Supported Standing, Static Balance) contact guard assist  -CW      Time Able to Maintain Position (Supported Standing, Static Balance) 30 to 45 seconds  -CW      Assistive Device Utilized (Supported Standing, Static Balance) rolling walker  -CW      Comment (Supported Standing, Static Balance) Narrow base of support, 3x30\"  -CW      Level of [place] (Unsupported Standing, Static Balance) contact guard assist  -CW      Time Able to Maintain Position (Unsupported Standing, Static Balance) 15 to 30 seconds  -CW      Comment (Unsupported Standing, Static Balance) narrow base of support 3x15\"  -CW      Recorded by [CW] Candida Biggs, PT 10/18/18 1405      Row Name 10/18/18 1040             Pain Scale: Numbers Pre/Post-Treatment    Pain Scale: Numbers, Pretreatment 6/10  -CW      Pain Scale: Numbers, Post-Treatment 6/10  -CW      Pain Location throat  -CW      Pain Intervention(s) Ambulation/increased activity   water  -CW      Recorded by [CW] Candida Biggs, PT 10/18/18 1357      Row Name 10/18/18 1040             Outcome Summary/Treatment Plan (PT)    Daily Summary of Progress (PT) progress toward functional goals is good  -CW      Plan for Continued Treatment (PT) continue  -CW      Anticipated Discharge Disposition (PT) skilled nursing facility;home with 24/7 care  -CW      Recorded by [CW] Candida Biggs, PT 10/18/18 5714        User Key  (r) = Recorded By, (t) = Taken By, (c) = Cosigned By    Initials Name Effective Dates Discipline    CW Candida Biggs, PT 10/04/18 -  PT                       PT Rehab Goals     Row Name 10/18/18 1040             Bed Mobility Goal 1 (PT)    Activity/Assistive Device (Bed Mobility Goal 1, PT) bed mobility activities, all  -CW      [place] Level/Cues Needed (Bed Mobility Goal 1, PT) conditional independence  -CW      Time Frame (Bed Mobility Goal 1, PT) 2 days  -CW      Progress/Outcomes (Bed Mobility Goal 1, PT) goal not met  -CW         Transfer " Goal 1 (PT)    Activity/Assistive Device (Transfer Goal 1, PT) sit-to-stand/stand-to-sit  -CW      [place] Level/Cues Needed (Transfer Goal 1, PT) supervision required;conditional independence  -CW      Time Frame (Transfer Goal 1, PT) 2 days  -CW      Progress/Outcome (Transfer Goal 1, PT) goal not met  -CW         Gait Training Goal 1 (PT)    Activity/Assistive Device (Gait Training Goal 1, PT) gait (walking locomotion);assistive device use;decrease fall risk;diminish gait deviation;improve balance and speed;increase endurance/gait distance;increase energy conservation  -CW      [place] Level (Gait Training Goal 1, PT) minimum assist (75% or more patient effort);moderate assist (50-74% patient effort)  -CW      Distance (Gait Goal 1, PT) 100  -CW      Time Frame (Gait Training Goal 1, PT) 2 - 3 days  -CW      Progress/Outcome (Gait Training Goal 1, PT) goal revised this date  -CW         Stairs Goal 1 (PT)    Activity/Assistive Device (Stairs Goal 1, PT) ascending stairs;descending stairs;decrease fall risk  -CW      [place] Level/Cues Needed (Stairs Goal 1, PT) minimum assist (75% or more patient effort)  -CW      Number of Stairs (Stairs Goal 1, PT) 1 stair to be able to get into/out of family room  -CW      Time Frame (Stairs Goal 1, PT) long term goal (LTG)  -CW      Progress/Outcome (Stairs Goal 1, PT) goal not met  -CW        User Key  (r) = Recorded By, (t) = Taken By, (c) = Cosigned By    Initials Name Provider Type Discipline    CW Candida Biggs PT Physical Therapist PT          Physical Therapy Education     Title: PT OT SLP Therapies (Active)     Topic: Physical Therapy (Active)     Point: Mobility training (Done)    Learning Progress Summary     Learner Status Readiness Method Response Comment Documented by    Patient Done Acceptance E ZEUSNR Appropriate AD use, hand placement during t/f and mobility, deep breathing CW 10/18/18 1370     Done Acceptance RHODA HERNANDEZ  JA 10/17/18 1450      Done Acceptance E,D VU,NR Role of PT in acute care, use of gait belt, use of RW, deep breathing.  10/16/18 1428    Family Done Acceptance E,D VU,NR Role of PT in acute care, use of gait belt, use of RW, deep breathing.  10/16/18 1428          Point: Body mechanics (Done)    Learning Progress Summary     Learner Status Readiness Method Response Comment Documented by    Patient Done Acceptance E VU,NR Appropriate AD use, hand placement during t/f and mobility, deep breathing  10/18/18 1357     Done Acceptance E VU,NR   10/17/18 1452     Done Acceptance E,D VU,NR Role of PT in acute care, use of gait belt, use of RW, deep breathing.  10/16/18 1428    Family Done Acceptance E,D VU,NR Role of PT in acute care, use of gait belt, use of RW, deep breathing.  10/16/18 1428          Point: Precautions (Done)    Learning Progress Summary     Learner Status Readiness Method Response Comment Documented by    Patient Done Acceptance E VU,NR Appropriate AD use, hand placement during t/f and mobility, deep breathing  10/18/18 1357     Done Acceptance E VU,NR   10/17/18 1452     Done Acceptance E,D VU,NR Role of PT in acute care, use of gait belt, use of RW, deep breathing.  10/16/18 1428    Family Done Acceptance E,D VU,NR Role of PT in acute care, use of gait belt, use of RW, deep breathing.  10/16/18 1428                      User Key     Initials Effective Dates Name Provider Type Discipline     03/07/18 -  Isac Thacker PTA Physical Therapy Assistant PT     10/04/18 -  Candida Biggs PT Physical Therapist PT                    PT Recommendation and Plan  Anticipated Discharge Disposition (PT): skilled nursing facility, home with 24/7 care  Planned Therapy Interventions (PT Eval): balance training, bed mobility training, gait training, home exercise program, stair training, strengthening, transfer training  Therapy Frequency (PT Clinical Impression): other (see comments) (5-7x/week)  Outcome  Summary/Treatment Plan (PT)  Daily Summary of Progress (PT): progress toward functional goals is good  Plan for Continued Treatment (PT): continue  Anticipated Discharge Disposition (PT): skilled nursing facility, home with 24/7 care  Patient/Family Concerns, Anticipated Discharge Disposition (PT): Concerned with attaining 24/7 care  Plan of Care Reviewed With: patient, family  Progress: improving  Outcome Summary: Pt was CGA for supine<>sit, and Josef for sit<>stand, min A for ambulation with FWW 1x60 ft with 2 rest breaks and O2 sats >91% throughout. Pt continues to improve with mobility, t/f, and ambulation. Pt would benefit from SNF or home with 24/7 care when d/c from hospital.          Outcome Measures     Row Name 10/18/18 1044 10/17/18 1345 10/17/18 1105       How much help from another person do you currently need...    Turning from your back to your side while in flat bed without using bedrails? 4  -CW 3  -JA  --    Moving from lying on back to sitting on the side of a flat bed without bedrails? 3  -CW 3  -JA  --    Moving to and from a bed to a chair (including a wheelchair)? 3  -CW 3  -JA  --    Standing up from a chair using your arms (e.g., wheelchair, bedside chair)? 3  -CW 3  -JA  --    Climbing 3-5 steps with a railing? 2  -CW 2  -JA  --    To walk in hospital room? 3  -CW 3  -JA  --    AM-PAC 6 Clicks Score 18  -CW 17  -JA  --       How much help from another is currently needed...    Putting on and taking off regular lower body clothing?  --  -- 2  -LW    Bathing (including washing, rinsing, and drying)  --  -- 2  -LW    Toileting (which includes using toilet bed pan or urinal)  --  -- 2  -LW    Putting on and taking off regular upper body clothing  --  -- 2  -LW    Taking care of personal grooming (such as brushing teeth)  --  -- 3  -LW    Eating meals  --  -- 3  -LW    Score  --  -- 14  -LW       Functional Assessment    Outcome Measure Options AM-PAC 6 Clicks Basic Mobility (PT)  -CW AM-PAC 6  Clicks Basic Mobility (PT)  -  --    Row Name 10/16/18 1000 10/16/18 0842 10/16/18 0841       How much help from another person do you currently need...    Turning from your back to your side while in flat bed without using bedrails?  -- 3  -CW  --    Moving from lying on back to sitting on the side of a flat bed without bedrails?  -- 3  -CW  --    Moving to and from a bed to a chair (including a wheelchair)?  -- 2  -CW  --    Standing up from a chair using your arms (e.g., wheelchair, bedside chair)?  -- 2  -CW  --    Climbing 3-5 steps with a railing?  -- 1  -CW  --    To walk in hospital room?  -- 2  -CW  --    AM-PAC 6 Clicks Score  -- 13  -CW  --       How much help from another is currently needed...    Putting on and taking off regular lower body clothing? 2  -LW  -- 2  -BH    Bathing (including washing, rinsing, and drying) 2  -LW  -- 2  -BH    Toileting (which includes using toilet bed pan or urinal) 2  -LW  -- 2  -BH    Putting on and taking off regular upper body clothing 2  -LW  -- 2  -BH    Taking care of personal grooming (such as brushing teeth) 3  -LW  -- 3  -BH    Eating meals 3  -LW  -- 3  -BH    Score 14  -LW  -- 14  -BH       Functional Assessment    Outcome Measure Options  -- AM-PAC 6 Clicks Basic Mobility (PT)  -CW AM-PAC 6 Clicks Daily Activity (OT)  -      User Key  (r) = Recorded By, (t) = Taken By, (c) = Cosigned By    Initials Name Provider Type     Liyah Mcnair, OTR/L Occupational Therapist    Isac Coles, PTA Physical Therapy Assistant    Alexa Baumann, MONSIVAIS/L Occupational Therapy Assistant    Candida Gonzalez, GO Physical Therapist           Time Calculation:         PT Charges     Row Name 10/18/18 1350 10/18/18 1040          Time Calculation    Start Time 1044  -CW  --     Stop Time 1118  -CW  --     Time Calculation (min) 34 min  -CW  --     PT Received On 10/18/18  -CW  --        Time Calculation- PT    Total Timed Code Minutes- PT 34 minute(s)  -CW   --        Timed Charges    56174 - Gait Training Minutes  19  -CW 19  -CW     30594 - PT Therapeutic Activity Minutes 15  -CW  --       User Key  (r) = Recorded By, (t) = Taken By, (c) = Cosigned By    Initials Name Provider Type    CW Candida Biggs, PT Physical Therapist        Therapy Suggested Charges     Code   Minutes Charges    74265 (CPT®) Hc Pt Neuromusc Re Education Ea 15 Min      90249 (CPT®) Hc Pt Ther Proc Ea 15 Min      68447 (CPT®) Hc Gait Training Ea 15 Min 19 1    07897 (CPT®) Hc Pt Therapeutic Act Ea 15 Min 15 1    18289 (CPT®) Hc Pt Manual Therapy Ea 15 Min      88667 (CPT®) Hc Pt Iontophoresis Ea 15 Min      72367 (CPT®) Hc Pt Elec Stim Ea-Per 15 Min      56371 (CPT®) Hc Pt Ultrasound Ea 15 Min      84180 (CPT®) Hc Pt Self Care/Mgmt/Train Ea 15 Min      58170 (CPT®) Hc Pt Prosthetic (S) Train Initial Encounter, Each 15 Min      69132 (CPT®) Hc Pt Orthotic(S)/Prosthetic(S) Encounter, Each 15 Min      13950 (CPT®) Hc Orthotic(S) Mgmt/Train Initial Encounter, Each 15min      Total  34 2        Therapy Charges for Today     Code Description Service Date Service Provider Modifiers Qty    33510517882 HC GAIT TRAINING EA 15 MIN 10/18/2018 Candida Biggs, PT GP 1    36777143236 HC PT THERAPEUTIC ACT EA 15 MIN 10/18/2018 Candida Biggs, PT GP 1          PT G-Codes  PT Professional Judgement Used?: Yes  Outcome Measure Options: AM-PAC 6 Clicks Basic Mobility (PT)  AM-PAC 6 Clicks Score: 18  Score: 14  Functional Limitation: Mobility: Walking and moving around  Mobility: Walking and Moving Around Current Status (): At least 40 percent but less than 60 percent impaired, limited or restricted  Mobility: Walking and Moving Around Goal Status (): At least 20 percent but less than 40 percent impaired, limited or restricted    Candida Biggs, PT  10/18/2018

## 2018-10-18 NOTE — THERAPY TREATMENT NOTE
Acute Care - Physical Therapy Treatment Note  [facility]     Patient Name: Mariaa Pires  : 4/10/1926  MRN: [id number]  Today's Date: 10/18/2018  Onset of Illness/Injury or Date of Surgery: 10/13/18  Date of Referral to PT: 10/15/18  Referring Physician: Dr. North    Admit Date: 10/13/2018    Visit Dx:    ICD-10-CM ICD-9-CM   1. Acute on chronic respiratory failure with hypoxia (CMS/HCC) J96.21 518.84     799.02   2. Pneumonia due to infectious organism, unspecified laterality, unspecified part of lung J18.9 136.9     484.8   3. NSTEMI (non-ST elevated myocardial infarction) (CMS/[facility]) I21.4 410.70   4. Oropharyngeal dysphagia R13.12 787.22   5. Impaired mobility and ADLs Z74.09 799.89   6. Impaired functional mobility, balance, gait, and endurance Z74.09 V49.89     Patient Active Problem List   Diagnosis   • Pneumonia   • Bilateral pleural effusion   • Osteoporosis   • Acute respiratory failure with hypoxia and hypercapnia (CMS/[facility])   • Diabetes mellitus (CMS/[facility])   • Hypertension   • Pulmonary hypertension (CMS/[facility])   • Aortic valve stenosis       Therapy Treatment          Rehabilitation Treatment Summary     Row Name 10/18/18 1040             Treatment Time/Intention    Discipline physical therapist  -CW      Document Type therapy note (daily note)  -CW      Subjective Information no complaints  -CW      Mode of Treatment individual therapy;physical therapy  -CW      Patient/Family Observations Daugher-in-laws present  -CW      Care Plan Review patient/other agree to care plan  -CW      Total Minutes, Physical Therapy Treatment 34  -CW2      Therapy Frequency (PT Clinical Impression) other (see comments)   5-7x/week  -CW2      Existing Precautions/Restrictions fall;oxygen therapy device and L/min  -CW2      Patient Response to Treatment Pt tolerated tx well  -CW2      Recorded by [CW] Candida Biggs, PT 10/18/18 1350  [CW2] Candida Biggs, PT 10/18/18 1357      Row Name 10/18/18 6611              Vital Signs    Pre Systolic BP Rehab 137  -CW      Pre Treatment Diastolic BP 60  -CW      Post Systolic BP Rehab 148  -CW      Post Treatment Diastolic BP 61  -CW      Pretreatment Heart Rate (beats/min) 94  -CW      Intratreatment Heart Rate (beats/min) 110  -CW      Posttreatment Heart Rate (beats/min) 92  -CW      Pre SpO2 (%) 96  -CW      O2 Delivery Pre Treatment supplemental O2  -CW      Intra SpO2 (%) 91  -CW      O2 Delivery Intra Treatment supplemental O2  -CW      Post SpO2 (%) 94  -CW      O2 Delivery Post Treatment supplemental O2  -CW      Pre Patient Position Supine  -CW      Intra Patient Position Standing  -CW      Post Patient Position Supine  -CW      Rest Breaks  2   during ambulation  -CW      Recorded by [CW] Candida Biggs, PT 10/18/18 1357      Row Name 10/18/18 1040             Cognitive Assessment/Intervention- PT/OT    Affect/Mental Status (Cognitive) WFL  -CW      Orientation Status (Cognition) oriented x 4  -CW      Follows Commands (Cognition) follows one step commands;75-90% accuracy;delayed response/completion;repetition of directions required  -CW      Safety Deficit (Cognitive) safety precautions follow-through/compliance;mild deficit  -CW      Recorded by [CW] Candida Biggs, PT 10/18/18 1357      Row Name 10/18/18 1040             Bed Mobility Assessment/Treatment    Supine-Sit [place] (Bed Mobility) verbal cues;contact guard  -CW      Sit-Supine [place] (Bed Mobility) verbal cues;contact guard  -CW      Recorded by [CW] Candida Biggs, PT 10/18/18 1357      Row Name 10/18/18 1040             Sit-Stand Transfer    Sit-Stand [place] (Transfers) minimum assist (75% patient effort);verbal cues  -CW      Assistive Device (Sit-Stand Transfers) walker, front-wheeled  -CW      Recorded by [CW] Candida Biggs, PT 10/18/18 1357      Row Name 10/18/18 1040             Stand-Sit Transfer    Stand-Sit [place] (Transfers) contact guard  -CW      Assistive  Device (Stand-Sit Transfers) walker, front-wheeled  -CW      Recorded by [CW] Candida Biggs, PT 10/18/18 1357      Row Name 10/18/18 1040             Toilet Transfer    Type (Toilet Transfer) sit-stand  -CW      [place] Level (Toilet Transfer) moderate assist (50% patient effort)  -CW      Assistive Device (Toilet Transfer) walker, front-wheeled  -CW      Recorded by [CW] Candida Biggs, PT 10/18/18 1357      Row Name 10/18/18 1040             Gait/Stairs Assessment/Training    47595 - Gait Training Minutes  19  -CW      [place] Level (Gait) minimum assist (75% patient effort);verbal cues  -CW      Assistive Device (Gait) walker, front-wheeled  -CW      Distance in Feet (Gait) 1x50 ft with 2 standing rest breaks   Pt stated she didn't need wheelchair follow  -CW      Deviations/Abnormal Patterns (Gait) base of support, wide;gait speed decreased;stride length decreased  -CW      Recorded by [CW] Candida Biggs, PT 10/18/18 1357      Row Name 10/18/18 1040             Motor Skills Assessment/Interventions    Additional Documentation Therapeutic Exercise Interventions (Group);Therapeutic Exercise (Group);Balance (Group)  -CW      Recorded by [CW] Candida Biggs, PT 10/18/18 1405      Row Name 10/18/18 1040             Therapeutic Exercise    Lower Extremity (Therapeutic Exercise) LAQ (long arc quad), bilateral;marching while standing;marching while seated  -CW      Exercise Type (Therapeutic Exercise) AROM (active range of motion)  -CW      Position (Therapeutic Exercise) seated;standing  -CW      Sets/Reps (Therapeutic Exercise) 1x20  -CW      Expected Outcome (Therapeutic Exercise) improve functional stability  -CW      Recorded by [CW] Candida Biggs, PT 10/18/18 1405      Row Name 10/18/18 1040             Balance    Balance static standing balance  -CW      Recorded by [CW] Candida Biggs, PT 10/18/18 1405      Row Name 10/18/18 1040             Static Standing Balance    Level of  "[place] (Supported Standing, Static Balance) contact guard assist  -CW      Time Able to Maintain Position (Supported Standing, Static Balance) 30 to 45 seconds  -CW      Assistive Device Utilized (Supported Standing, Static Balance) rolling walker  -CW      Comment (Supported Standing, Static Balance) Narrow base of support, 3x30\"  -CW      Level of [place] (Unsupported Standing, Static Balance) contact guard assist  -CW      Time Able to Maintain Position (Unsupported Standing, Static Balance) 15 to 30 seconds  -CW      Comment (Unsupported Standing, Static Balance) narrow base of support 3x15\"  -CW      Recorded by [CW] Candida Biggs, PT 10/18/18 1405      Row Name 10/18/18 1040             Pain Scale: Numbers Pre/Post-Treatment    Pain Scale: Numbers, Pretreatment 6/10  -CW      Pain Scale: Numbers, Post-Treatment 6/10  -CW      Pain Location throat  -CW      Pain Intervention(s) Ambulation/increased activity   water  -CW      Recorded by [CW] Candida Biggs, PT 10/18/18 1357      Row Name 10/18/18 1040             Outcome Summary/Treatment Plan (PT)    Daily Summary of Progress (PT) progress toward functional goals is good  -CW      Plan for Continued Treatment (PT) continue  -CW      Anticipated Discharge Disposition (PT) skilled nursing facility;home with 24/7 care  -CW      Recorded by [CW] Candida Biggs, PT 10/18/18 3597        User Key  (r) = Recorded By, (t) = Taken By, (c) = Cosigned By    Initials Name Effective Dates Discipline    CW Candida Biggs, PT 10/04/18 -  PT                     Physical Therapy Education     Title: PT OT SLP Therapies (Active)     Topic: Physical Therapy (Active)     Point: Mobility training (Done)    Learning Progress Summary     Learner Status Readiness Method Response Comment Documented by    Patient Done Acceptance E VU,NR Appropriate AD use, hand placement during t/f and mobility, deep breathing CW 10/18/18 3277     Done Acceptance E VU,NR  JA " 10/17/18 1452     Done Acceptance E,D VU,NR Role of PT in acute care, use of gait belt, use of RW, deep breathing.  10/16/18 1428    Family Done Acceptance E,D VU,NR Role of PT in acute care, use of gait belt, use of RW, deep breathing.  10/16/18 1428          Point: Body mechanics (Done)    Learning Progress Summary     Learner Status Readiness Method Response Comment Documented by    Patient Done Acceptance E VU,NR Appropriate AD use, hand placement during t/f and mobility, deep breathing  10/18/18 1357     Done Acceptance E VU,NR   10/17/18 1452     Done Acceptance E,D VU,NR Role of PT in acute care, use of gait belt, use of RW, deep breathing.  10/16/18 1428    Family Done Acceptance E,D VU,NR Role of PT in acute care, use of gait belt, use of RW, deep breathing.  10/16/18 1428          Point: Precautions (Done)    Learning Progress Summary     Learner Status Readiness Method Response Comment Documented by    Patient Done Acceptance E VU,NR Appropriate AD use, hand placement during t/f and mobility, deep breathing  10/18/18 1357     Done Acceptance E VU,NR   10/17/18 1452     Done Acceptance E,D VU,NR Role of PT in acute care, use of gait belt, use of RW, deep breathing.  10/16/18 1428    Family Done Acceptance E,D VU,NR Role of PT in acute care, use of gait belt, use of RW, deep breathing.  10/16/18 1428                      User Key     Initials Effective Dates Name Provider Type Discipline     03/07/18 -  Isac Thacker PTA Physical Therapy Assistant PT     10/04/18 -  Candida Biggs PT Physical Therapist PT                    PT Recommendation and Plan  Anticipated Discharge Disposition (PT): skilled nursing facility, home with 24/7 care  Planned Therapy Interventions (PT Eval): balance training, bed mobility training, gait training, home exercise program, stair training, strengthening, transfer training  Therapy Frequency (PT Clinical Impression): other (see comments)  (5-7x/week)  Outcome Summary/Treatment Plan (PT)  Daily Summary of Progress (PT): progress toward functional goals is good  Plan for Continued Treatment (PT): continue  Anticipated Discharge Disposition (PT): skilled nursing facility, home with 24/7 care  Patient/Family Concerns, Anticipated Discharge Disposition (PT): Concerned with attaining 24/7 care  Plan of Care Reviewed With: patient, family  Progress: improving  Outcome Summary: Pt was CGA for supine<>sit, and Josef for sit<>stand, min A for ambulation with FWW 1x60 ft with 2 rest breaks and O2 sats >91% throughout. Pt continues to improve with mobility, t/f, and ambulation. Pt would benefit from SNF or home with 24/7 care when d/c from hospital.          Outcome Measures     Row Name 10/18/18 1044 10/17/18 1345 10/17/18 1105       How much help from another person do you currently need...    Turning from your back to your side while in flat bed without using bedrails? 4  -CW 3  -JA  —    Moving from lying on back to sitting on the side of a flat bed without bedrails? 3  -CW 3  -JA  —    Moving to and from a bed to a chair (including a wheelchair)? 3  -CW 3  -JA  —    Standing up from a chair using your arms (e.g., wheelchair, bedside chair)? 3  -CW 3  -JA  —    Climbing 3-5 steps with a railing? 2  -CW 2  -JA  —    To walk in hospital room? 3  -CW 3  -JA  —    AM-PAC 6 Clicks Score 18  -CW 17  -JA  —       How much help from another is currently needed...    Putting on and taking off regular lower body clothing?  —  — 2  -LW    Bathing (including washing, rinsing, and drying)  —  — 2  -LW    Toileting (which includes using toilet bed pan or urinal)  —  — 2  -LW    Putting on and taking off regular upper body clothing  —  — 2  -LW    Taking care of personal grooming (such as brushing teeth)  —  — 3  -LW    Eating meals  —  — 3  -LW    Score  —  — 14  -LW       Functional Assessment    Outcome Measure Options AM-PAC 6 Clicks Basic Mobility (PT)  -CW AM-PAC 6  Clicks Basic Mobility (PT)  -JA  —    Row Name 10/16/18 1000 10/16/18 0842 10/16/18 0841       How much help from another person do you currently need...    Turning from your back to your side while in flat bed without using bedrails?  — 3  -CW  —    Moving from lying on back to sitting on the side of a flat bed without bedrails?  — 3  -CW  —    Moving to and from a bed to a chair (including a wheelchair)?  — 2  -CW  —    Standing up from a chair using your arms (e.g., wheelchair, bedside chair)?  — 2  -CW  —    Climbing 3-5 steps with a railing?  — 1  -CW  —    To walk in hospital room?  — 2  -CW  —    AM-PAC 6 Clicks Score  — 13  -CW  —       How much help from another is currently needed...    Putting on and taking off regular lower body clothing? 2  -LW  — 2  -BH    Bathing (including washing, rinsing, and drying) 2  -LW  — 2  -BH    Toileting (which includes using toilet bed pan or urinal) 2  -LW  — 2  -BH    Putting on and taking off regular upper body clothing 2  -LW  — 2  -BH    Taking care of personal grooming (such as brushing teeth) 3  -LW  — 3  -BH    Eating meals 3  -LW  — 3  -BH    Score 14  -LW  — 14  -BH       Functional Assessment    Outcome Measure Options  — AM-PAC 6 Clicks Basic Mobility (PT)  -CW AM-PAC 6 Clicks Daily Activity (OT)  -BH      User Key  (r) = Recorded By, (t) = Taken By, (c) = Cosigned By    Initials Name Provider Type     Liyah Mcnair, OTR/L Occupational Therapist    Isac Coles, PTA Physical Therapy Assistant    Alexa Baumann, MONSIVAIS/L Occupational Therapy Assistant    Candida Gonzalez, GO Physical Therapist           Time Calculation:         PT Charges     Row Name 10/18/18 1350 10/18/18 1040          Time Calculation    Start Time 1044  -CW  —     Stop Time 1118  -CW  —     Time Calculation (min) 34 min  -CW  —     PT Received On 10/18/18  -CW  —        Time Calculation- PT    Total Timed Code Minutes- PT 34 minute(s)  -CW  —        Timed Charges     35184 - Gait Training Minutes  19  -CW 19  -CW     73225 - PT Therapeutic Activity Minutes 15  -CW  —       User Key  (r) = Recorded By, (t) = Taken By, (c) = Cosigned By    Initials Name Provider Type    CW Candida Biggs, PT Physical Therapist        Therapy Suggested Charges     Code   Minutes Charges    14378 (CPT®) Hc Pt Neuromusc Re Education Ea 15 Min      13811 (CPT®) Hc Pt Ther Proc Ea 15 Min      37582 (CPT®) Hc Gait Training Ea 15 Min 19 1    31260 (CPT®) Hc Pt Therapeutic Act Ea 15 Min 15 1    90040 (CPT®) Hc Pt Manual Therapy Ea 15 Min      18605 (CPT®) Hc Pt Iontophoresis Ea 15 Min      22894 (CPT®) Hc Pt Elec Stim Ea-Per 15 Min      65565 (CPT®) Hc Pt Ultrasound Ea 15 Min      24981 (CPT®) Hc Pt Self Care/Mgmt/Train Ea 15 Min      05433 (CPT®) Hc Pt Prosthetic (S) Train Initial Encounter, Each 15 Min      67231 (CPT®) Hc Pt Orthotic(S)/Prosthetic(S) Encounter, Each 15 Min      55630 (CPT®) Hc Orthotic(S) Mgmt/Train Initial Encounter, Each 15min      Total  34 2        Therapy Charges for Today     Code Description Service Date Service Provider Modifiers Qty    44437118948 HC GAIT TRAINING EA 15 MIN 10/18/2018 Candida Biggs, PT GP 1    26818182171 HC PT THERAPEUTIC ACT EA 15 MIN 10/18/2018 Candida Biggs, PT GP 1          PT G-Codes  PT Professional Judgement Used?: Yes  Outcome Measure Options: AM-PAC 6 Clicks Basic Mobility (PT)  AM-PAC 6 Clicks Score: 18  Score: 14  Functional Limitation: Mobility: Walking and moving around  Mobility: Walking and Moving Around Current Status (): At least 40 percent but less than 60 percent impaired, limited or restricted  Mobility: Walking and Moving Around Goal Status (): At least 20 percent but less than 40 percent impaired, limited or restricted    Candida Biggs PT  10/18/2018

## 2018-10-19 NOTE — THERAPY TREATMENT NOTE
Acute Care - Occupational Therapy Treatment Note  [facility]     Patient Name: Mariaa Pires  : 4/10/1926  MRN: [id number]  Today's Date: 10/19/2018  Onset of Illness/Injury or Date of Surgery: 10/13/18  Date of Referral to OT: 10/15/18  Referring Physician: Dr. North    Admit Date: 10/13/2018       ICD-10-CM ICD-9-CM   1. Acute on chronic respiratory failure with hypoxia (CMS/[facility]) J96.21 518.84     799.02   2. Pneumonia due to infectious organism, unspecified laterality, unspecified part of lung J18.9 136.9     484.8   3. NSTEMI (non-ST elevated myocardial infarction) (CMS/[facility]) I21.4 410.70   4. Oropharyngeal dysphagia R13.12 787.22   5. Impaired mobility and ADLs Z74.09 799.89   6. Impaired functional mobility, balance, gait, and endurance Z74.09 V49.89     Patient Active Problem List   Diagnosis   • Pneumonia   • Bilateral pleural effusion   • Osteoporosis   • Acute respiratory failure with hypoxia and hypercapnia (CMS/[facility])   • Diabetes mellitus (CMS/[facility])   • Hypertension   • Pulmonary hypertension (CMS/[facility])   • Aortic valve stenosis     Past Medical History:   Diagnosis Date   • Diabetes mellitus (CMS/HCC) 10/14/2018   • Hypertension    • Osteoporosis 10/14/2018     History reviewed. No pertinent surgical history.    Therapy Treatment          Rehabilitation Treatment Summary     Row Name 10/19/18 1056 10/19/18 0956          Treatment Time/Intention    Discipline physical therapy assistant  -TA occupational therapy assistant  -LW     Document Type therapy note (daily note)  -TA therapy note (daily note)  -LW     Subjective Information no complaints  -TA no complaints  -LW     Mode of Treatment individual therapy;physical therapy  -TA occupational therapy  -LW     Patient/Family Observations  -- Son and wife in room  -LW     Care Plan Review  -- care plan/treatment goals reviewed  -LW     Care Plan Review, Other Participant(s)  -- son  -LW     Therapy Frequency (PT Clinical Impression) other (see  comments)   5-7x/week  -TA --   5-7 days per week  -LW     Total Minutes, Occupational Therapy Treatment  -- 60  -LW     Therapy Frequency (OT Eval)  -- other (see comments)   5-7 days per week  -LW     Patient Effort good  -TA good  -LW     Comment  -- pt getting ready to D/C  -LW     Existing Precautions/Restrictions fall;oxygen therapy device and L/min  -TA fall;oxygen therapy device and L/min  -LW     Equipment Issued to Patient  -- gait belt  -LW     Recorded by [TA] Annel Justice, PTA 10/19/18 1151 [LW] Alexa Desir MONSIVAIS/L 10/19/18 1433     Row Name 10/19/18 1056 10/19/18 0956          Vital Signs    Pre Systolic BP Rehab 141  -  -LW     Pre Treatment Diastolic BP 63  -TA 69  -LW     Post Systolic BP Rehab 184  -TA  --     Post Treatment Diastolic BP 74  -TA  --     Pretreatment Heart Rate (beats/min) 85  -TA 80  -LW     Posttreatment Heart Rate (beats/min) 81  -TA 83  -LW     Pre SpO2 (%) 96  -TA 96  -LW     O2 Delivery Pre Treatment supplemental O2  -TA supplemental O2  -LW     Post SpO2 (%) 100  -TA 97  -LW     O2 Delivery Post Treatment supplemental O2  -TA supplemental O2  -LW     Pre Patient Position Supine  -TA Supine  -LW     Post Patient Position Supine  -TA Standing  -LW     Recorded by [TA] Annel Justice, PTA 10/19/18 1151 [LW] Alexa Desir MONSIVAIS/L 10/19/18 1433     Row Name 10/19/18 1056 10/19/18 0956          Cognitive Assessment/Intervention- PT/OT    Affect/Mental Status (Cognitive) WFL  -TA WFL  -LW     Orientation Status (Cognition) oriented x 4  -TA oriented x 4  -LW     Follows Commands (Cognition) follows one step commands;75-90% accuracy;delayed response/completion;repetition of directions required  -TA follows multi-step commands  -LW     Safety Deficit (Cognitive) safety precautions awareness  -TA safety precautions awareness  -LW     Personal Safety Interventions fall prevention program maintained;gait belt;supervised activity;nonskid shoes/slippers when out of bed   -TA fall prevention program maintained;gait belt;nonskid shoes/slippers when out of bed  -LW     Recorded by [TA] Annel Justice, PTA 10/19/18 1151 [LW] Alexa Desir MONSIVAIS/L 10/19/18 1433     Row Name 10/19/18 0956             Safety Issues, Functional Mobility    Impairments Affecting Function (Mobility) endurance/activity tolerance  -LW      Recorded by [LW] Alexa Desir MONSIVAIS/L 10/19/18 1433      Row Name 10/19/18 1056 10/19/18 0956          Bed Mobility Assessment/Treatment    Bed Mobility Assessment/Treatment  -- supine-sit;sit-supine  -LW     Supine-Sit [place] (Bed Mobility) supervision  -TA supervision  -LW     Sit-Supine [place] (Bed Mobility) supervision  -TA supervision  -LW     Recorded by [TA] Annel Justice, PTA 10/19/18 1151 [LW] Alexa Desir MONSIVAIS/L 10/19/18 1433     Row Name 10/19/18 1056 10/19/18 0956          Functional Mobility    Functional Mobility- Ind. Level contact guard assist  -TA contact guard assist  -LW     Functional Mobility- Device rolling walker  -TA rolling walker  -LW     Functional Mobility-Distance (Feet) 6  -TA 6   Bed<>bath<>bed  -LW     Recorded by [TA] Annel Justice, PTA 10/19/18 1151 [LW] Alexa Desir MONSIVAIS/L 10/19/18 1433     Row Name 10/19/18 0956             Transfer Assessment/Treatment    Transfer Assessment/Treatment sit-stand transfer;stand-sit transfer;toilet transfer  -LW      Recorded by [LW] Alexa Desir MONSIVAIS/L 10/19/18 1433      Row Name 10/19/18 1056 10/19/18 0956          Sit-Stand Transfer    Sit-Stand [place] (Transfers) contact guard  -TA contact guard  -LW     Assistive Device (Sit-Stand Transfers) walker, front-wheeled  -TA walker, front-wheeled  -LW     Recorded by [TA] Annel Justice, PTA 10/19/18 1151 [LW] Alexa Desir MONSIVAIS/L 10/19/18 1433     Row Name 10/19/18 1056 10/19/18 0956          Stand-Sit Transfer    Stand-Sit [place] (Transfers) contact guard  -TA contact guard  -LW     Assistive Device  (Stand-Sit Transfers) walker, front-wheeled  -TA walker, front-wheeled  -LW     Recorded by [TA] Annel Justice, PTA 10/19/18 1151 [LW] Alexa Desir MONSIVAIS/L 10/19/18 1433     Row Name 10/19/18 1056 10/19/18 0956          Toilet Transfer    Type (Toilet Transfer) sit-stand;stand-sit  -TA sit-stand;stand-sit   X3  -LW     [place] Level (Toilet Transfer) minimum assist (75% patient effort)  -TA2 minimum assist (75% patient effort)  -LW     Assistive Device (Toilet Transfer) walker, front-wheeled  -TA2 walker, front-wheeled  -LW     Recorded by [TA] Annel Justice, PTA 10/19/18 1156  [TA2] Annel Justice, PTA 10/19/18 1151 [LW] Alexa Desir MONSIVAIS/L 10/19/18 1433     Row Name 10/19/18 1056             Gait/Stairs Assessment/Training    Gait/Stairs Assessment/Training gait/ambulation assistive device  -TA      [place] Level (Gait) contact guard  -TA      Assistive Device (Gait) walker, front-wheeled  -TA      Distance in Feet (Gait) 150`  -TA      Deviations/Abnormal Patterns (Gait) base of support, wide;gait speed decreased;stride length decreased  -TA      Recorded by [TA] Annel Justice, PTA 10/19/18 1151      Row Name 10/19/18 0956             ADL Assessment/Intervention    BADL Assessment/Intervention bathing;upper body dressing;lower body dressing;grooming;toileting  -LW      Recorded by [LW] Alexa Desir MONSIVAIS/L 10/19/18 1433      Row Name 10/19/18 0956             Bathing Assessment/Intervention    Bathing [place] Level bathing skills;lower body;upper body;standby assist;set up  -LW      Assistive Devices (Bathing) other (see comments)   Wash cloth, Pan bath  -LW      Bathing Position supported sitting  -LW      Recorded by [LW] Alexa Desir MONSIVAIS/L 10/19/18 1433      Row Name 10/19/18 0956             Upper Body Dressing Assessment/Training    Upper Body Dressing [place] Level upper body dressing skills;don;doff;mane/pio;supervision;set up  -LW      Upper Body Dressing  Position edge of bed sitting  -LW      Recorded by [LW] Alexa Desir COTA/L 10/19/18 1433      Row Name 10/19/18 0956             Lower Body Dressing Assessment/Training    Lower Body Dressing [place] Level lower body dressing skills;doff;don;pants/bottoms;socks;undergarment;set up;contact guard assist  -LW      Lower Body Dressing Position edge of bed sitting  -LW      Recorded by [LW] Alexa Desir COTA/L 10/19/18 1433      Row Name 10/19/18 0956             Grooming Assessment/Training    [place] Level (Grooming) grooming skills;hair care, combing/brushing;oral care regimen;wash face, hands;set up;supervision  -LW      Grooming Position edge of bed sitting  -LW      Recorded by [LW] Alexa Desir COTA/L 10/19/18 1433      Row Name 10/19/18 0956             Toileting Assessment/Training    [place] Level (Toileting) toileting skills;adjust/manage clothing;perform perineal hygiene;set up;supervision  -LW      Assistive Devices (Toileting) commode;grab bar/safety frame;raised toilet seat  -LW      Recorded by [LW] Alexa Desir COTA/L 10/19/18 1433      Row Name 10/19/18 0956             Static Standing Balance    Level of [place] (Supported Standing, Static Balance) contact guard assist  -LW      Time Able to Maintain Position (Supported Standing, Static Balance) 1 to 2 minutes  -LW      Assistive Device Utilized (Supported Standing, Static Balance) rolling walker  -LW      Recorded by [LW] Alexa Desir COTA/L 10/19/18 1433      Row Name 10/19/18 1056 10/19/18 0956          Positioning and Restraints    Pre-Treatment Position in bed  -TA in bed  -LW     Post Treatment Position bed  -TA bed  -LW     In Bed supine;call light within reach;exit alarm on;with family/caregiver  -TA supine;call light within reach;encouraged to call for assist;exit alarm on;with family/caregiver  -LW     Recorded by [TA] Annel Justice, PTA 10/19/18 1151 [LW] Alexa Dseir COTA/L 10/19/18 1433      Row Name 10/19/18 1056 10/19/18 0956          Pain Scale: Numbers Pre/Post-Treatment    Pain Scale: Numbers, Pretreatment 6/10  -TA 6/10  -LW     Pain Scale: Numbers, Post-Treatment 6/10  -TA 6/10  -LW     Pain Location throat  -TA throat  -LW     Recorded by [TA] Annel Justice, PTA 10/19/18 1151 [LW] Alexa Desir MONSIVAIS/L 10/19/18 1433     Row Name 10/19/18 0956             Sensory Assessment/Intervention    Sensory General Assessment no sensation deficits identified  -LW      Recorded by [LW] Alexa Desir MONSIVAIS/L 10/19/18 1433      Row Name 10/19/18 0956             Hearing Assessment    Hearing Status hearing impairment, bilaterally  -LW      Recorded by [LW] Alexa Desir MONSIVAIS/L 10/19/18 1433      Row Name 10/19/18 0956             Vision Assessment/Intervention    Visual Impairment/Limitations corrective lenses full time  -LW      Recorded by [LW] Alexa Desir MONSIVAIS/L 10/19/18 1433      Row Name 10/19/18 0956             Light Touch Sensation Assessment    Left Upper Extremity: Light Touch Sensation Assessment intact  -LW      Right Upper Extremity: Light Touch Sensation Assessment mild impairment, 75% or more correct responses  -LW      Recorded by [LW] Alexa Desir MONSIVAIS/L 10/19/18 1433      Row Name 10/19/18 1056             Coping    Observed Emotional State accepting;calm;cooperative  -LW      Verbalized Emotional State acceptance  -LW      Recorded by [LW] Alexa Desir MONSIVAIS/L 10/19/18 1433      Row Name 10/19/18 1056             Plan of Care Review    Plan of Care Reviewed With patient  -LW      Recorded by [LW] Alexa Desir MONSIVAIS/L 10/19/18 1433      Row Name 10/19/18 1056             Outcome Summary/Treatment Plan (OT)    Daily Summary of Progress (OT) progress toward functional goals is good  -LW      Plan for Continued Treatment (OT) ContinuePOC  -LW      Anticipated Discharge Disposition (OT) home with home health;home with assist;skilled nursing facility  -LW       Recorded by [LW] Alexa Desir MONSIVAIS/L 10/19/18 1433      Row Name 10/19/18 1056             Outcome Summary/Treatment Plan (PT)    Daily Summary of Progress (PT) progress toward functional goals is good  -TA      Plan for Continued Treatment (PT) continue  -TA      Anticipated Discharge Disposition (PT) skilled nursing facility;home with 24/7 care  -TA      Recorded by [TA] Annel Justice, PTA 10/19/18 1151        User Key  (r) = Recorded By, (t) = Taken By, (c) = Cosigned By    Initials Name Effective Dates Discipline    TA Annel Justice, PTA 03/07/18 -  PT    LW Alexa Desir MONSIVAIS/L 03/07/18 -  OT                   OT Rehab Goals     Row Name 10/19/18 1057 10/19/18 1056          Transfer Goal 1 (OT)    Activity/Assistive Device (Transfer Goal 1, OT) toilet  -LW toilet  -LW     [place] Level/Cues Needed (Transfer Goal 1, OT) minimum assist (75% or more patient effort)  -LW minimum assist (75% or more patient effort)  -LW     Time Frame (Transfer Goal 1, OT) long term goal (LTG);by discharge  -LW long term goal (LTG);by discharge  -LW     Progress/Outcome (Transfer Goal 1, OT) goal met  -LW goal not met  -LW        Bathing Goal 1 (OT)    Activity/Assistive Device (Bathing Goal 1, OT) bathing skills, all  -LW bathing skills, all  -LW     [place] Level/Cues Needed (Bathing Goal 1, OT) minimum assist (75% or more patient effort)  -LW minimum assist (75% or more patient effort)  -LW     Time Frame (Bathing Goal 1, OT) long term goal (LTG);by discharge  -LW long term goal (LTG);by discharge  -LW     Progress/Outcomes (Bathing Goal 1, OT) goal met  -LW goal not met  -LW        Dressing Goal 1 (OT)    Activity/Assistive Device (Dressing Goal 1, OT) dressing skills, all  -LW dressing skills, all  -LW     [place]/Cues Needed (Dressing Goal 1, OT) minimum assist (75% or more patient effort)  -LW minimum assist (75% or more patient effort)  -LW     Time Frame (Dressing Goal 1, OT) long term goal  (LTG);by discharge  -LW long term goal (LTG);by discharge  -LW     Progress/Outcome (Dressing Goal 1, OT) goal met  -LW goal not met  -LW        Toileting Goal 1 (OT)    Activity/Device (Toileting Goal 1, OT) toileting skills, all  -LW toileting skills, all  -LW     [place] Level/Cues Needed (Toileting Goal 1, OT) minimum assist (75% or more patient effort)  -LW minimum assist (75% or more patient effort)  -LW     Time Frame (Toileting Goal 1, OT) long term goal (LTG);by discharge  -LW long term goal (LTG);by discharge  -LW     Progress/Outcome (Toileting Goal 1, OT) goal met  -LW goal not met  -LW        Grooming Goal 1 (OT)    Activity/Device (Grooming Goal 1, OT) grooming skills, all  -LW grooming skills, all  -LW     [place] (Grooming Goal 1, OT) set-up required;standby assist;verbal cues required;tactile cues required  -LW set-up required;standby assist;verbal cues required;tactile cues required  -LW     Time Frame (Grooming Goal 1, OT) long term goal (LTG);by discharge  -LW long term goal (LTG);by discharge  -LW     Progress/Outcome (Grooming Goal 1, OT) goal met  -LW goal not met  -LW       User Key  (r) = Recorded By, (t) = Taken By, (c) = Cosigned By    Initials Name Provider Type Discipline    Alexa Baumann COTA/L Occupational Therapy Assistant OT        Occupational Therapy Education     Title: PT OT SLP Therapies (Active)     Topic: Occupational Therapy (Active)     Point: ADL training (Active)     Description: Instruct learner(s) on proper safety adaptation and remediation techniques during self care or transfers.   Instruct in proper use of assistive devices.   Learning Progress Summary     Learner Status Readiness Method Response Comment Documented by    Patient Done Acceptance E,TB,D VU Eucated pt on use of walker, standing, sitting, walking LW 10/19/18 1437     Done Acceptance E,TB VU  LW 10/18/18 1510     Done Acceptance E,TB VU Educated pt on fall precautions LW 10/17/18 1514      Done Acceptance E,TB VU  LW 10/16/18 1208     Active Acceptance E NR Educated about OT and POC. Educated on safety throughout including hand placement for t/f. Educated on need for 24/7 care. Educated how to use the gait belt and when to family.  10/16/18 1105    Family Active Acceptance E NR Educated about OT and POC. Educated on safety throughout including hand placement for t/f. Educated on need for 24/7 care. Educated how to use the gait belt and when to family.  10/16/18 1105          Point: Home exercise program (Done)     Description: Instruct learner(s) on appropriate technique for monitoring, assisting and/or progressing therapeutic exercises/activities.   Learning Progress Summary     Learner Status Readiness Method Response Comment Documented by    Patient Done Acceptance E,TB,D VU Eucated pt on use of walker, standing, sitting, walking LW 10/19/18 1437     Done Acceptance E,TB VU  LW 10/18/18 1510     Done Acceptance E,TB VU Educated pt on fall precautions LW 10/17/18 1514     Done Acceptance E,TB VU  LW 10/16/18 1208          Point: Precautions (Active)     Description: Instruct learner(s) on prescribed precautions during self-care and functional transfers.   Learning Progress Summary     Learner Status Readiness Method Response Comment Documented by    Patient Done Acceptance E,TB,D VU Eucated pt on use of walker, standing, sitting, walking LW 10/19/18 1437     Done Acceptance E,TB VU  LW 10/18/18 1510     Done Acceptance E,TB VU Educated pt on fall precautions LW 10/17/18 1514     Done Acceptance E,TB VU  LW 10/16/18 1208     Active Acceptance E NR Educated about OT and POC. Educated on safety throughout including hand placement for t/f. Educated on need for 24/7 care. Educated how to use the gait belt and when to family.  10/16/18 1105    Family Active Acceptance E NR Educated about OT and POC. Educated on safety throughout including hand placement for t/f. Educated on need for 24/7 care.  Educated how to use the gait belt and when to family.  10/16/18 1105          Point: Body mechanics (Done)     Description: Instruct learner(s) on proper positioning and spine alignment during self-care, functional mobility activities and/or exercises.   Learning Progress Summary     Learner Status Readiness Method Response Comment Documented by    Patient Done Acceptance E,TB,D VU Eucated pt on use of walker, standing, sitting, walking  10/19/18 1437     Done Acceptance E,TB VU   10/18/18 1510     Done Acceptance E,TB VU Educated pt on fall precautions  10/17/18 1514     Done Acceptance E,TB VU   10/16/18 1208                      User Key     Initials Effective Dates Name Provider Type Discipline     06/08/18 -  Liyah Mcnair, OTR/L Occupational Therapist OT     03/07/18 -  Alexa Desir, MONSIVAIS/L Occupational Therapy Assistant OT                OT Recommendation and Plan  Outcome Summary/Treatment Plan (OT)  Daily Summary of Progress (OT): progress toward functional goals is good  Plan for Continued Treatment (OT): ContinuePOC  Anticipated Discharge Disposition (OT): home with home health, home with assist, skilled nursing facility  Therapy Frequency (OT Eval): other (see comments) (5-7 days per week)  Daily Summary of Progress (OT): progress toward functional goals is good  Plan of Care Review  Plan of Care Reviewed With: (P) patient  Plan of Care Reviewed With: (P) patient  Outcome Summary: (P) Pt performed dressing toileting and bathing. All goals met this day.         Outcome Measures     Row Name 10/19/18 1100 10/19/18 1055 10/18/18 1335       How much help from another person do you currently need...    Turning from your back to your side while in flat bed without using bedrails? 4  -TA  --  --    Moving from lying on back to sitting on the side of a flat bed without bedrails? 3  -TA  --  --    Moving to and from a bed to a chair (including a wheelchair)? 3  -TA  --  --    Standing up from  a chair using your arms (e.g., wheelchair, bedside chair)? 3  -TA  --  --    Climbing 3-5 steps with a railing? 2  -TA  --  --    To walk in hospital room? 3  -TA  --  --    AM-PAC 6 Clicks Score 18  -TA  --  --       How much help from another is currently needed...    Putting on and taking off regular lower body clothing?  -- 2  -LW 2  -LW    Bathing (including washing, rinsing, and drying)  -- 2  -LW 2  -LW    Toileting (which includes using toilet bed pan or urinal)  -- 2  -LW 2  -LW    Putting on and taking off regular upper body clothing  -- 2  -LW 2  -LW    Taking care of personal grooming (such as brushing teeth)  -- 2  -LW 3  -LW    Eating meals  -- 4  -LW 3  -LW    Score  -- 14  -LW 14  -LW       Functional Assessment    Outcome Measure Options AM-PAC 6 Clicks Basic Mobility (PT)  -TA  --  --    Row Name 10/18/18 1044 10/17/18 1345 10/17/18 1105       How much help from another person do you currently need...    Turning from your back to your side while in flat bed without using bedrails? 4  -CW 3  -JA  --    Moving from lying on back to sitting on the side of a flat bed without bedrails? 3  -CW 3  -JA  --    Moving to and from a bed to a chair (including a wheelchair)? 3  -CW 3  -JA  --    Standing up from a chair using your arms (e.g., wheelchair, bedside chair)? 3  -CW 3  -JA  --    Climbing 3-5 steps with a railing? 2  -CW 2  -JA  --    To walk in hospital room? 3  -CW 3  -JA  --    AM-PAC 6 Clicks Score 18  -CW 17  -JA  --       How much help from another is currently needed...    Putting on and taking off regular lower body clothing?  --  -- 2  -LW    Bathing (including washing, rinsing, and drying)  --  -- 2  -LW    Toileting (which includes using toilet bed pan or urinal)  --  -- 2  -LW    Putting on and taking off regular upper body clothing  --  -- 2  -LW    Taking care of personal grooming (such as brushing teeth)  --  -- 3  -LW    Eating meals  --  -- 3  -LW    Score  --  -- 14  -LW        Functional Assessment    Outcome Measure Options AM-PAC 6 Clicks Basic Mobility (PT)  -CW AM-PAC 6 Clicks Basic Mobility (PT)  -SHAUNA  --      User Key  (r) = Recorded By, (t) = Taken By, (c) = Cosigned By    Initials Name Provider Type    Isac Coles, PTA Physical Therapy Assistant    TA Annel Justice, PTA Physical Therapy Assistant    Alexa Baumann COTA/L Occupational Therapy Assistant    Candida Gonzalez, PT Physical Therapist           Time Calculation:         Time Calculation- OT     Row Name 10/19/18 1438             Time Calculation- OT    OT Start Time 0955  -LW      OT Stop Time 1055  -LW      OT Time Calculation (min) 60 min  -LW      Total Timed Code Minutes- OT 60 minute(s)  -LW      OT Received On 10/19/18  -        User Key  (r) = Recorded By, (t) = Taken By, (c) = Cosigned By    Initials Name Provider Type    Alexa Baumann COTA/L Occupational Therapy Assistant           Therapy Suggested Charges     Code   Minutes Charges    None           Therapy Charges for Today     Code Description Service Date Service Provider Modifiers Qty    49800806516 HC OT THER PROC EA 15 MIN 10/18/2018 Alexa Desir COTA/L GO 2    [id number] HC OT SELF CARE/MGMT/TRAIN EA 15 MIN 10/19/2018 Alexa Desir COTA/L GO 4        Non-skid socks and gait belt in place. Toileting offered. Call light and needs within reach. Pt advised to not get up alone and call the nurse for assistance.  Bed alarm on.     OT G-codes  OT Professional Judgement Used?: Yes  OT Functional Scales Options: AM-PAC 6 Clicks Daily Activity (OT)  Score: 14  Functional Limitation: Self care  Self Care Current Status (): At least 60 percent but less than 80 percent impaired, limited or restricted  Self Care Goal Status (): At least 40 percent but less than 60 percent impaired, limited or restricted    ROEL Suárez  10/19/2018

## 2018-10-19 NOTE — SIGNIFICANT NOTE
10/19/18 0915   Rehab Treatment   Discipline physical therapy assistant   Reason Treatment Not Performed unavailable for treatment  (pt in the BR)

## 2018-10-19 NOTE — THERAPY DISCHARGE NOTE
Acute Care - Occupational Therapy Discharge Summary  [facility]     Patient Name: Mariaa Pires  : 4/10/1926  MRN: [id number]    Today's Date: 10/19/2018  Onset of Illness/Injury or Date of Surgery: 10/13/18    Date of Referral to OT: 10/15/18  Referring Physician: Dr. North      Admit Date: 10/13/2018        OT Recommendation and Plan    Visit Dx:    ICD-10-CM ICD-9-CM   1. Acute on chronic respiratory failure with hypoxia (CMS/[facility]) J96.21 518.84     799.02   2. Pneumonia due to infectious organism, unspecified laterality, unspecified part of lung J18.9 136.9     484.8   3. NSTEMI (non-ST elevated myocardial infarction) (CMS/[facility]) I21.4 410.70   4. Oropharyngeal dysphagia R13.12 787.22   5. Impaired mobility and ADLs Z74.09 799.89   6. Impaired functional mobility, balance, gait, and endurance Z74.09 V49.89               Time Calculation- OT     Row Name 10/19/18 1438             Time Calculation- OT    OT Start Time 0955  -LW      OT Stop Time 1055  -LW      OT Time Calculation (min) 60 min  -LW      Total Timed Code Minutes- OT 60 minute(s)  -LW      OT Received On 10/19/18  -LW        User Key  (r) = Recorded By, (t) = Taken By, (c) = Cosigned By    Initials Name Provider Type    LW Alexa Desir, YODIT/L Occupational Therapy Assistant                  OT Rehab Goals     Row Name 10/19/18 1057 10/19/18 1056          Transfer Goal 1 (OT)    Activity/Assistive Device (Transfer Goal 1, OT) toilet  -LW toilet  -LW     [place] Level/Cues Needed (Transfer Goal 1, OT) minimum assist (75% or more patient effort)  -LW minimum assist (75% or more patient effort)  -LW     Time Frame (Transfer Goal 1, OT) long term goal (LTG);by discharge  -LW long term goal (LTG);by discharge  -LW     Progress/Outcome (Transfer Goal 1, OT) goal met  -LW goal not met  -LW        Bathing Goal 1 (OT)    Activity/Assistive Device (Bathing Goal 1, OT) bathing skills, all  -LW bathing skills, all  -LW     [place]  Level/Cues Needed (Bathing Goal 1, OT) minimum assist (75% or more patient effort)  -LW minimum assist (75% or more patient effort)  -LW     Time Frame (Bathing Goal 1, OT) long term goal (LTG);by discharge  -LW long term goal (LTG);by discharge  -LW     Progress/Outcomes (Bathing Goal 1, OT) goal met  -LW goal not met  -LW        Dressing Goal 1 (OT)    Activity/Assistive Device (Dressing Goal 1, OT) dressing skills, all  -LW dressing skills, all  -LW     [place]/Cues Needed (Dressing Goal 1, OT) minimum assist (75% or more patient effort)  -LW minimum assist (75% or more patient effort)  -LW     Time Frame (Dressing Goal 1, OT) long term goal (LTG);by discharge  -LW long term goal (LTG);by discharge  -LW     Progress/Outcome (Dressing Goal 1, OT) goal met  -LW goal not met  -LW        Toileting Goal 1 (OT)    Activity/Device (Toileting Goal 1, OT) toileting skills, all  -LW toileting skills, all  -LW     [place] Level/Cues Needed (Toileting Goal 1, OT) minimum assist (75% or more patient effort)  -LW minimum assist (75% or more patient effort)  -LW     Time Frame (Toileting Goal 1, OT) long term goal (LTG);by discharge  -LW long term goal (LTG);by discharge  -LW     Progress/Outcome (Toileting Goal 1, OT) goal met  -LW goal not met  -LW        Grooming Goal 1 (OT)    Activity/Device (Grooming Goal 1, OT) grooming skills, all  -LW grooming skills, all  -LW     [place] (Grooming Goal 1, OT) set-up required;standby assist;verbal cues required;tactile cues required  -LW set-up required;standby assist;verbal cues required;tactile cues required  -LW     Time Frame (Grooming Goal 1, OT) long term goal (LTG);by discharge  -LW long term goal (LTG);by discharge  -LW     Progress/Outcome (Grooming Goal 1, OT) goal met  -LW goal not met  -LW       User Key  (r) = Recorded By, (t) = Taken By, (c) = Cosigned By    Initials Name Provider Type Discipline    Alexa Baumann COTA/L Occupational Therapy Assistant  OT                Outcome Measures     Row Name 10/19/18 1100 10/19/18 1055 10/18/18 1335       How much help from another person do you currently need...    Turning from your back to your side while in flat bed without using bedrails? 4  -TA  --  --    Moving from lying on back to sitting on the side of a flat bed without bedrails? 3  -TA  --  --    Moving to and from a bed to a chair (including a wheelchair)? 3  -TA  --  --    Standing up from a chair using your arms (e.g., wheelchair, bedside chair)? 3  -TA  --  --    Climbing 3-5 steps with a railing? 2  -TA  --  --    To walk in hospital room? 3  -TA  --  --    AM-PAC 6 Clicks Score 18  -TA  --  --       How much help from another is currently needed...    Putting on and taking off regular lower body clothing?  -- 2  -LW 2  -LW    Bathing (including washing, rinsing, and drying)  -- 2  -LW 2  -LW    Toileting (which includes using toilet bed pan or urinal)  -- 2  -LW 2  -LW    Putting on and taking off regular upper body clothing  -- 2  -LW 2  -LW    Taking care of personal grooming (such as brushing teeth)  -- 2  -LW 3  -LW    Eating meals  -- 4  -LW 3  -LW    Score  -- 14  -LW 14  -LW       Functional Assessment    Outcome Measure Options AM-PAC 6 Clicks Basic Mobility (PT)  -TA  --  --    Row Name 10/18/18 1044 10/17/18 1345 10/17/18 1105       How much help from another person do you currently need...    Turning from your back to your side while in flat bed without using bedrails? 4  -CW 3  -JA  --    Moving from lying on back to sitting on the side of a flat bed without bedrails? 3  -CW 3  -JA  --    Moving to and from a bed to a chair (including a wheelchair)? 3  -CW 3  -JA  --    Standing up from a chair using your arms (e.g., wheelchair, bedside chair)? 3  -CW 3  -JA  --    Climbing 3-5 steps with a railing? 2  -CW 2  -JA  --    To walk in hospital room? 3  -CW 3  -JA  --    AM-PAC 6 Clicks Score 18  -CW 17  -JA  --       How much help from another is  currently needed...    Putting on and taking off regular lower body clothing?  --  -- 2  -LW    Bathing (including washing, rinsing, and drying)  --  -- 2  -LW    Toileting (which includes using toilet bed pan or urinal)  --  -- 2  -LW    Putting on and taking off regular upper body clothing  --  -- 2  -LW    Taking care of personal grooming (such as brushing teeth)  --  -- 3  -LW    Eating meals  --  -- 3  -LW    Score  --  -- 14  -LW       Functional Assessment    Outcome Measure Options AM-PAC 6 Clicks Basic Mobility (PT)  -NEHAL AM-PAC 6 Clicks Basic Mobility (PT)  -SHAUNA  --      User Key  (r) = Recorded By, (t) = Taken By, (c) = Cosigned By    Initials Name Provider Type    Isac Coles, PTA Physical Therapy Assistant    TA Annel Justice, PTA Physical Therapy Assistant    LW Alexa Desir, MONSIVAIS/L Occupational Therapy Assistant    CW Candida Biggs, PT Physical Therapist          Therapy Suggested Charges     Code   Minutes Charges    None                 OT Discharge Summary  Anticipated Discharge Disposition (OT): home with home health, home with assist, skilled nursing facility  Reason for Discharge: Discharge from facility, Per MD order  Outcomes Achieved: Able to achieve all goals within established timeline, Refer to plan of care for updates on goals achieved  Discharge Destination: SNF      Jodie Kern, AYNELI  10/19/2018

## 2018-10-19 NOTE — PLAN OF CARE
Problem: Fall Risk (Adult)  Goal: Absence of Fall  Outcome: Ongoing (interventions implemented as appropriate)      Problem: Patient Care Overview  Goal: Plan of Care Review  Outcome: Ongoing (interventions implemented as appropriate)   10/19/18 0225   Coping/Psychosocial   Plan of Care Reviewed With patient   Plan of Care Review   Progress improving   OTHER   Outcome Summary Pt able to ambulate to the bathroom today. Vital signs stable. Will continue to monitor.      Goal: Individualization and Mutuality  Outcome: Ongoing (interventions implemented as appropriate)    Goal: Interprofessional Rounds/Family Conf  Outcome: Ongoing (interventions implemented as appropriate)      Problem: Skin Injury Risk (Adult)  Goal: Skin Health and Integrity  Outcome: Ongoing (interventions implemented as appropriate)      Problem: Pneumonia (Adult)  Goal: Signs and Symptoms of Listed Potential Problems Will be Absent, Minimized or Managed (Pneumonia)  Outcome: Ongoing (interventions implemented as appropriate)      Problem: Breathing Pattern Ineffective (Adult)  Goal: Effective Oxygenation/Ventilation  Outcome: Ongoing (interventions implemented as appropriate)    Goal: Anxiety/Fear Reduction  Outcome: Ongoing (interventions implemented as appropriate)

## 2018-10-19 NOTE — THERAPY DISCHARGE NOTE
Acute Care - Physical Therapy Discharge Summary  [facility]       Patient Name: Mariaa Pires  : 4/10/1926  MRN: [id number]    Today's Date: 10/19/2018  Onset of Illness/Injury or Date of Surgery: 10/13/18    Date of Referral to PT: 10/15/18  Referring Physician: Dr. North      Admit Date: 10/13/2018      PT Recommendation and Plan    Visit Dx:    ICD-10-CM ICD-9-CM   1. Acute on chronic respiratory failure with hypoxia (CMS/[facility]) J96.21 518.84     799.02   2. Pneumonia due to infectious organism, unspecified laterality, unspecified part of lung J18.9 136.9     484.8   3. NSTEMI (non-ST elevated myocardial infarction) (CMS/[facility]) I21.4 410.70   4. Oropharyngeal dysphagia R13.12 787.22   5. Impaired mobility and ADLs Z74.09 799.89   6. Impaired functional mobility, balance, gait, and endurance Z74.09 V49.89             Outcome Measures     Row Name 10/19/18 1100 10/18/18 1335 10/18/18 1044       How much help from another person do you currently need...    Turning from your back to your side while in flat bed without using bedrails? 4  -TA  -- 4  -CW    Moving from lying on back to sitting on the side of a flat bed without bedrails? 3  -TA  -- 3  -CW    Moving to and from a bed to a chair (including a wheelchair)? 3  -TA  -- 3  -CW    Standing up from a chair using your arms (e.g., wheelchair, bedside chair)? 3  -TA  -- 3  -CW    Climbing 3-5 steps with a railing? 2  -TA  -- 2  -CW    To walk in hospital room? 3  -TA  -- 3  -CW    AM-PAC 6 Clicks Score 18  -TA  -- 18  -CW       How much help from another is currently needed...    Putting on and taking off regular lower body clothing?  -- 2  -LW  --    Bathing (including washing, rinsing, and drying)  -- 2  -LW  --    Toileting (which includes using toilet bed pan or urinal)  -- 2  -LW  --    Putting on and taking off regular upper body clothing  -- 2  -LW  --    Taking care of personal grooming (such as brushing teeth)  -- 3  -LW  --    Eating meals  --  3  -LW  --    Score  -- 14  -LW  --       Functional Assessment    Outcome Measure Options AM-PAC 6 Clicks Basic Mobility (PT)  -TA  -- [facility] 6 Clicks Basic Mobility (PT)  -CW    Row Name 10/17/18 1345 10/17/18 1105          How much help from another person do you currently need...    Turning from your back to your side while in flat bed without using bedrails? 3  -JA  --     Moving from lying on back to sitting on the side of a flat bed without bedrails? 3  -JA  --     Moving to and from a bed to a chair (including a wheelchair)? 3  -JA  --     Standing up from a chair using your arms (e.g., wheelchair, bedside chair)? 3  -JA  --     Climbing 3-5 steps with a railing? 2  -JA  --     To walk in hospital room? 3  -JA  --     AM-PAC 6 Clicks Score 17  -JA  --        How much help from another is currently needed...    Putting on and taking off regular lower body clothing?  -- 2  -LW     Bathing (including washing, rinsing, and drying)  -- 2  -LW     Toileting (which includes using toilet bed pan or urinal)  -- 2  -LW     Putting on and taking off regular upper body clothing  -- 2  -LW     Taking care of personal grooming (such as brushing teeth)  -- 3  -LW     Eating meals  -- 3  -LW     Score  -- 14  -LW        Functional Assessment    Outcome Measure Options AM-PAC 6 Clicks Basic Mobility (PT)  -  --       User Key  (r) = Recorded By, (t) = Taken By, (c) = Cosigned By    Initials Name Provider Type    Isac Coles, PTA Physical Therapy Assistant    Annel Hawk, NYDIA Physical Therapy Assistant    Alexa Baumann, MONSIVAIS/L Occupational Therapy Assistant    Candida Gonzalez, PT Physical Therapist                PT Charges     Row Name 10/19/18 1156             Time Calculation    Start Time 1056  -TA      Stop Time 1137  -TA      Time Calculation (min) 41 min  -TA         Time Calculation- PT    Total Timed Code Minutes- PT 41 minute(s)  -TA        User Key  (r) = Recorded By, (t) = Taken By,  (c) = Cosigned By    Initials Name Provider Type    Annel Hawk, PTA Physical Therapy Assistant        Therapy Suggested Charges     Code   Minutes Charges    12305 (CPT®) Hc Pt Neuromusc Re Education Ea 15 Min      00322 (CPT®) Hc Pt Ther Proc Ea 15 Min      50960 (CPT®) Hc Gait Training Ea 15 Min 19 1    29620 (CPT®) Hc Pt Therapeutic Act Ea 15 Min 15 1    10682 (CPT®) Hc Pt Manual Therapy Ea 15 Min      04039 (CPT®) Hc Pt Iontophoresis Ea 15 Min      42490 (CPT®) Hc Pt Elec Stim Ea-Per 15 Min      87299 (CPT®) Hc Pt Ultrasound Ea 15 Min      02140 (CPT®) Hc Pt Self Care/Mgmt/Train Ea 15 Min      80521 (CPT®) Hc Pt Prosthetic (S) Train Initial Encounter, Each 15 Min      66663 (CPT®) Hc Pt Orthotic(S)/Prosthetic(S) Encounter, Each 15 Min      12799 (CPT®) Hc Orthotic(S) Mgmt/Train Initial Encounter, Each 15min      Total  34 2                PT Rehab Goals     Row Name 10/19/18 1056             Bed Mobility Goal 1 (PT)    Activity/Assistive Device (Bed Mobility Goal 1, PT) bed mobility activities, all  -TA      [place] Level/Cues Needed (Bed Mobility Goal 1, PT) conditional independence  -TA      Time Frame (Bed Mobility Goal 1, PT) 2 days  -TA      Progress/Outcomes (Bed Mobility Goal 1, PT) goal not met  -TA         Transfer Goal 1 (PT)    Activity/Assistive Device (Transfer Goal 1, PT) sit-to-stand/stand-to-sit  -TA      [place] Level/Cues Needed (Transfer Goal 1, PT) supervision required;conditional independence  -TA      Time Frame (Transfer Goal 1, PT) 2 days  -TA      Progress/Outcome (Transfer Goal 1, PT) goal not met  -TA         Gait Training Goal 1 (PT)    Activity/Assistive Device (Gait Training Goal 1, PT) gait (walking locomotion);assistive device use;decrease fall risk;diminish gait deviation;improve balance and speed;increase endurance/gait distance;increase energy conservation  -TA      [place] Level (Gait Training Goal 1, PT) minimum assist (75% or more patient  effort);moderate assist (50-74% patient effort)  -TA      Distance (Gait Goal 1, PT) 100  -TA      Time Frame (Gait Training Goal 1, PT) 2 - 3 days  -TA      Progress/Outcome (Gait Training Goal 1, PT) goal met  -TA         Stairs Goal 1 (PT)    Activity/Assistive Device (Stairs Goal 1, PT) ascending stairs;descending stairs;decrease fall risk  -TA      [place] Level/Cues Needed (Stairs Goal 1, PT) minimum assist (75% or more patient effort)  -TA      Number of Stairs (Stairs Goal 1, PT) 1 stair to be able to get into/out of family room  -TA      Time Frame (Stairs Goal 1, PT) long term goal (LTG)  -TA      Progress/Outcome (Stairs Goal 1, PT) goal not met  -TA        User Key  (r) = Recorded By, (t) = Taken By, (c) = Cosigned By    Initials Name Provider Type Discipline    Annel Hawk, PTA Physical Therapy Assistant PT              PT Discharge Summary  Anticipated Discharge Disposition (PT): skilled nursing facility, home with 24/7 care  Reason for Discharge: Discharge from facility, Per MD order  Outcomes Achieved: Patient able to partially acheive established goals, Refer to plan of care for updates on goals achieved  Discharge Destination: SNF      Judith Luna, PT   10/19/2018

## 2018-10-19 NOTE — PLAN OF CARE
Problem: Patient Care Overview  Goal: Plan of Care Review  Outcome: Ongoing (interventions implemented as appropriate)   10/19/18 0880 10/19/18 3606   Coping/Psychosocial   Plan of Care Reviewed With patient --    Plan of Care Review   Progress --  improving   OTHER   Outcome Summary --  pt sup<>sit with SBA, sit<>stand with CGA, pt ambulated 150` with RW & CGA, pt would benefit from 24/7 care & continued PT services

## 2018-10-19 NOTE — DISCHARGE SUMMARY
[facility] Medicine Services  DISCHARGE SUMMARY       Date of Admission: 10/13/2018  Date of Discharge:  10/19/2018  Primary Care Physician: Breezy Mcfarland MD    Presenting Problem/History of Present Illness:  NSTEMI (non-ST elevated myocardial infarction) (CMS/[facility]) [I21.4]  Acute on chronic respiratory failure with hypoxia (CMS/[facility]) [J96.21]  Pneumonia due to infectious organism, unspecified laterality, unspecified part of lung [J18.9]       Final Discharge Diagnoses:  Active Hospital Problems    Diagnosis   • **Pulmonary hypertension (CMS/[facility])   • Aortic valve stenosis   • Bilateral pleural effusion   • Osteoporosis   • Acute respiratory failure with hypoxia and hypercapnia (CMS/[facility])   • Diabetes mellitus (CMS/[facility])   • Hypertension       Consults:   Consults     No orders found from 9/14/2018 to 10/14/2018.          Pertinent Test Results:   Lab Results (most recent)     Procedure Component Value Units Date/Time    Blood Culture - Blood, Blood, Venous Line [[id number]]  (Normal) Collected:  10/14/18 0256    Specimen:  Blood from Blood, Venous Line Updated:  10/19/18 0300     Blood Culture No growth at 5 days    Blood Culture - Blood, Blood, Venous Line [[id number]]  (Normal) Collected:  10/14/18 0112    Specimen:  Blood from Arm, Left Updated:  10/19/18 0130     Blood Culture No growth at 5 days    Magnesium [[id number]]  (Abnormal) Collected:  10/17/18 1819    Specimen:  Blood Updated:  10/17/18 1841     Magnesium 2.7 (H) mg/dL     Basic Metabolic Panel [[id number]]  (Abnormal) Collected:  10/16/18 2239    Specimen:  Blood Updated:  10/16/18 2347     Glucose 172 (H) mg/dL      BUN 20 mg/dL      Creatinine 0.91 mg/dL      Sodium 137 mmol/L      Potassium 3.5 mmol/L      Chloride 97 mmol/L      CO2 35.0 (H) mmol/L      Calcium 8.8 mg/dL      eGFR Non African Amer 58 mL/min/1.73      BUN/Creatinine Ratio 22.0     Anion Gap 5.0 mmol/L     Narrative:       The MDRD GFR formula  is only valid for adults with stable renal function between ages 18 and 70.    Troponin [[id number]]  (Abnormal) Collected:  10/16/18 2239    Specimen:  Blood Updated:  10/16/18 2347     Troponin I 0.104 (H) ng/mL     BNP [[id number]]  (Abnormal) Collected:  10/16/18 2239    Specimen:  Blood Updated:  10/16/18 2347     proBNP 5,970.0 (H) pg/mL     CK Total & CKMB [[id number]]  (Normal) Collected:  10/16/18 2239    Specimen:  Blood Updated:  10/16/18 2345     CKMB 1.88 ng/mL      Creatine Kinase 38 U/L     Magnesium [[id number]]  (Abnormal) Collected:  10/16/18 2239    Specimen:  Blood Updated:  10/16/18 2333     Magnesium 1.5 (L) mg/dL     Legionella Antigen, Urine - Urine, Urine, Clean Catch [[id number]]  (Normal) Collected:  10/14/18 0613    Specimen:  Urine from Urine, Clean Catch Updated:  10/14/18 0717     LEGIONELLA ANTIGEN, URINE Negative    S. Pneumo Ag Urine or CSF - Urine, Urine, Clean Catch [[id number]]  (Normal) Collected:  10/14/18 0613    Specimen:  Urine from Urine, Clean Catch Updated:  10/14/18 0715     Strep Pneumo Ag Negative    Comprehensive Metabolic Panel [[id number]]  (Abnormal) Collected:  10/14/18 0630    Specimen:  Blood Updated:  10/14/18 0705     Glucose 138 (H) mg/dL      BUN 28 (H) mg/dL      Creatinine 0.96 mg/dL      Sodium 136 (L) mmol/L      Potassium 4.1 mmol/L      Chloride 97 mmol/L      CO2 29.0 mmol/L      Calcium 9.3 mg/dL      Total Protein 7.1 g/dL      Albumin 3.70 g/dL      ALT (SGPT) 60 (H) U/L      AST (SGOT) 51 (H) U/L      Alkaline Phosphatase 154 (H) U/L      Total Bilirubin 0.7 mg/dL      eGFR Non African Amer 54 mL/min/1.73      Globulin 3.4 gm/dL      A/G Ratio 1.1 g/dL      BUN/Creatinine Ratio 29.2 (H)     Anion Gap 10.0 mmol/L     Narrative:       The MDRD GFR formula is only valid for adults with stable renal function between ages 18 and 70.    CBC Auto Differential [[id number]]  (Abnormal) Collected:  10/14/18 0630    Specimen:  Blood Updated:  10/14/18 0647     WBC  9.05 10*3/mm3      RBC 4.10 10*6/mm3      Hemoglobin 12.0 g/dL      Hematocrit 37.1 %      MCV 90.5 fL      MCH 29.3 pg      MCHC 32.3 g/dL      RDW 14.0 %      RDW-SD 46.3 fl      MPV 8.9 fL      Platelets 286 10*3/mm3      Neutrophil % 84.7 (H) %      Lymphocyte % 7.7 (L) %      Monocyte % 7.3 %      Eosinophil % 0.0 %      Basophil % 0.1 %      Immature Grans % 0.2 %      Neutrophils, Absolute 7.66 10*3/mm3      Lymphocytes, Absolute 0.70 10*3/mm3      Monocytes, Absolute 0.66 10*3/mm3      Eosinophils, Absolute 0.00 10*3/mm3      Basophils, Absolute 0.01 10*3/mm3      Immature Grans, Absolute 0.02 10*3/mm3     Respiratory Panel, PCR - Swab, Nasopharynx [[id number]]  (Normal) Collected:  10/14/18 0314    Specimen:  Swab from Nasopharynx Updated:  10/14/18 0439     ADENOVIRUS, PCR Not Detected     Coronavirus 229E Not Detected     Coronavirus HKU1 Not Detected     Coronavirus NL63 Not Detected     Coronavirus OC43 Not Detected     Human Metapneumovirus Not Detected     Human Rhinovirus/Enterovirus Not Detected     Influenza B PCR Not Detected     Parainfluenza Virus 1 Not Detected     Parainfluenza Virus 2 Not Detected     Parainfluenza Virus 3 Not Detected     Parainfluenza Virus 4 Not Detected     Bordetella pertussis pcr Not Detected     Influenza A H1 2009 PCR Not Detected     Chlamydophila pneumoniae PCR Not Detected     Mycoplasma pneumo by PCR Not Detected     Influenza A PCR Not Detected     Influenza A H3 Not Detected     Influenza A H1 Not Detected     RSV, PCR Not Detected    [place] Draw [[id number]] Collected:  10/13/18 3071    Specimen:  Blood Updated:  10/14/18 0016    Narrative:       The following orders were created for panel order [place] Draw.  Procedure                               Abnormality         Status                     ---------                               -----------         ------                     Light Blue Top[[id number]]                                    Final result                Green Top (Gel)[[id number]]                                   Final result               Lavender Top[[id number]]                                      Final result               Gold Top - SST[[id number]]                                    Final result                 Please view results for these tests on the individual orders.    Light Blue Top [[id number]] Collected:  10/13/18 2309    Specimen:  Blood Updated:  10/14/18 0016     Extra Tube hold for add-on     Comment: Auto resulted       Green Top (Gel) [[id number]] Collected:  10/13/18 2309    Specimen:  Blood Updated:  10/14/18 0016     Extra Tube Hold for add-ons.     Comment: Auto resulted.       Lavender Top [[id number]] Collected:  10/13/18 2309    Specimen:  Blood Updated:  10/14/18 0016     Extra Tube hold for add-on     Comment: Auto resulted       Gold Top - SST [[id number]] Collected:  10/13/18 2309    Specimen:  Blood Updated:  10/14/18 0016     Extra Tube Hold for add-ons.     Comment: Auto resulted.       Blood Gas, Arterial [[id number]]  (Abnormal) Collected:  10/13/18 2348    Specimen:  Arterial Blood Updated:  10/13/18 2355     Site Right Radial     Damion's Test N/A     pH, Arterial 7.316 (L) pH units      pCO2, Arterial 52.2 (H) mm Hg      pO2, Arterial 115.0 (H) mm Hg      HCO3, Arterial 26.6 (H) mmol/L      Base Excess, Arterial -0.3 (L) mmol/L      O2 Saturation, Arterial 98.0 %      Barometric Pressure for Blood Gas 748 mmHg      Modality Nasal Cannula     Flow Rate 5.0 lpm      Ventilator Mode NA     Collected by lucinda patino    Protime-INR [[id number]]  (Normal) Collected:  10/13/18 2309    Specimen:  Blood Updated:  10/13/18 2339     Protime 13.4 Seconds      INR 1.04    Narrative:       Therapeutic range for most indications is 2.0-3.0 INR,  or 2.5-3.5 for mechanical heart valves.    BNP [[id number]]  (Abnormal) Collected:  10/13/18 2309    Specimen:  Blood Updated:  10/13/18 2337     proBNP 20,100.0 (H) pg/mL     Troponin [[id number]]  (Abnormal) Collected:   10/13/18 2309    Specimen:  Blood Updated:  10/13/18 2337     Troponin I 0.047 (H) ng/mL     Comprehensive Metabolic Panel [[id number]]  (Abnormal) Collected:  10/13/18 2309    Specimen:  Blood Updated:  10/13/18 2326     Glucose 213 (H) mg/dL      BUN 31 (H) mg/dL      Creatinine 1.08 (H) mg/dL      Sodium 132 (L) mmol/L      Potassium 5.0 mmol/L      Chloride 94 (L) mmol/L      CO2 26.0 mmol/L      Calcium 9.4 mg/dL      Total Protein 7.5 g/dL      Albumin 4.00 g/dL      ALT (SGPT) 68 (H) U/L      AST (SGOT) 56 (H) U/L      Alkaline Phosphatase 166 (H) U/L      Total Bilirubin 0.8 mg/dL      eGFR Non African Amer 47 mL/min/1.73      Globulin 3.5 gm/dL      A/G Ratio 1.1 g/dL      BUN/Creatinine Ratio 28.7 (H)     Anion Gap 12.0 mmol/L     Narrative:       The MDRD GFR formula is only valid for adults with stable renal function between ages 18 and 70.    CBC & Differential [[id number]] Collected:  10/13/18 2309    Specimen:  Blood Updated:  10/13/18 2319    Narrative:       The following orders were created for panel order CBC & Differential.  Procedure                               Abnormality         Status                     ---------                               -----------         ------                     CBC Auto Differential[[id number]]         Abnormal            Final result                 Please view results for these tests on the individual orders.    CBC Auto Differential [[id number]]  (Abnormal) Collected:  10/13/18 2309    Specimen:  Blood Updated:  10/13/18 2319     WBC 11.37 (H) 10*3/mm3      RBC 4.22 10*6/mm3      Hemoglobin 12.6 g/dL      Hematocrit 38.3 %      MCV 90.8 fL      MCH 29.9 pg      MCHC 32.9 g/dL      RDW 14.0 %      RDW-SD 45.9 fl      MPV 8.9 fL      Platelets 333 10*3/mm3      Neutrophil % 78.9 %      Lymphocyte % 12.1 %      Monocyte % 7.9 %      Eosinophil % 0.6 %      Basophil % 0.4 %      Immature Grans % 0.1 %      Neutrophils, Absolute 8.96 (H) 10*3/mm3      Lymphocytes,  Absolute 1.38 10*3/mm3      Monocytes, Absolute 0.90 10*3/mm3      Eosinophils, Absolute 0.07 10*3/mm3      Basophils, Absolute 0.05 10*3/mm3      Immature Grans, Absolute 0.01 10*3/mm3      nRBC 0.0 /100 WBC         Imaging Results (most recent)     Procedure Component Value Units Date/Time    XR Chest 1 View [[id number]] Collected:  10/13/18 2307     Updated:  10/13/18 2328    Narrative:         Chest single view on  10/13/2018     CLINICAL INDICATION: Chest pain    COMPARISON: None    FINDINGS: 2-lead left subclavian pacemaker is noted in place.  Borderline cardiomegaly is noted. There is mild scoliosis of the  spine. There is small left pleural effusion. There are bibasilar  opacities consistent with atelectasis and/or pneumonia. Mild  chronic interstitial changes are noted. Vascular calcification is  noted in the aorta.      Impression:       Small left pleural effusion with bibasilar  atelectasis and/or pneumonia.    Electronically signed by:  Zackery Marcano  10/13/2018 11:27 PM  CDT Workstation: [id number]          Chief Complaint on Day of Discharge:  Shortness of breath-improved    Hospital Course:  The patient is a 89 y.o. female who presented to [facility] with shortness of breath and nausea.  She was initially treated as a pneumonia.  Echo was done and showed severe pulmonary hypertension likely secondary to tricuspid valve regurgitation.  This was discussed with the family.  Palliative care consult was placed.  It seems family wasn't quite ready for that.  Patient had an episode of atrial fibrillation and got more short of breath transiently.  No medications were added because she improved spontaneously.  On day of discharge she had improved pretty significantly and was ambulating with nursing and therapy.  Patient normally lives alone, but it was determined that she needed more intensive therapy than could be done at home.  Therefore, skilled rehab placement was  "arranged.    Condition on Discharge:  Stable    Physical Exam on Discharge:  /60 (BP Location: Left arm, Patient Position: Lying)   Pulse 75   Temp 98.1 °F (36.7 °C) (Temporal Artery )   Resp 20   Ht 160 cm (63\")   Wt 47.3 kg (104 lb 4.8 oz)   SpO2 98%   BMI 18.48 kg/m²      Physical Exam   Constitutional: She appears well-developed and well-nourished.   HENT:   Head: Normocephalic and atraumatic.   Eyes: Pupils are equal, round, and reactive to light. EOM are normal.   Neck: Normal range of motion. Neck supple.   Cardiovascular: Normal rate and regular rhythm.  Exam reveals no gallop and no friction rub.    Murmur heard.   Systolic murmur is present with a grade of 2/6   Pulmonary/Chest: Effort normal and breath sounds normal. No respiratory distress. She has no wheezes. She has no rales. She exhibits no tenderness.   Abdominal: Soft. Bowel sounds are normal. She exhibits no distension. There is no tenderness. There is no guarding.   Musculoskeletal: She exhibits no edema.   Skin: Skin is warm and dry.   Psychiatric: She has a normal mood and affect. Her behavior is normal. Thought content normal.   Vitals reviewed.        Discharge Disposition:  Skilled Nursing Facility (DC - External)    Discharge Medications:     Discharge Medications      New Medications      Instructions Start Date   ipratropium-albuterol 0.5-2.5 mg/3 ml nebulizer  Commonly known as:  DUO-NEB   3 mL, Nebulization, 4 Times Daily - RT      nystatin 112372 UNIT/ML suspension  Commonly known as:  MYCOSTATIN   5 mL, Swish & Swallow, 4 Times Daily         Continue These Medications      Instructions Start Date   aspirin 81 MG chewable tablet   81 mg, Oral, Daily      atorvastatin 20 MG tablet  Commonly known as:  LIPITOR  Notes to patient:  Take three times a week; next dose due 10/19/18 9AM   20 mg, Oral, 3 Times Weekly, Monday, Wednesday, Friday at night       Calcium Carbonate-Vitamin D3 600-400 MG-UNIT tablet   1 tablet, Oral, " Daily      digoxin 125 MCG tablet  Commonly known as:  LANOXIN   125 mcg, Oral, Daily Digoxin      levothyroxine 75 MCG tablet  Commonly known as:  SYNTHROID, LEVOTHROID   37.5 mcg, Oral, Daily      lisinopril 5 MG tablet  Commonly known as:  PRINIVIL,ZESTRIL   5 mg, Oral, Daily      meclizine 12.5 MG tablet  Commonly known as:  ANTIVERT  Notes to patient:  Next dose due 10/18/18 at 9PM   12.5 mg, Oral, 2 Times Daily      metoprolol tartrate 25 MG tablet  Commonly known as:  LOPRESSOR  Notes to patient:  Next dose due 10/18/18 at 9PM   25 mg, Oral, 2 Times Daily      montelukast 10 MG tablet  Commonly known as:  SINGULAIR  Notes to patient:  Next dose due 10/18/18 at 9PM   10 mg, Oral, Nightly      multivitamin with minerals tablet tablet  Notes to patient:  Next dose due 10/19/18 at 9AM   1 tablet, Oral, Daily      PRESERVISION AREDS 2+MULTI VIT PO  Notes to patient:  Take two times a day; next dose due 10/18/18 at 9PM   1 tablet, Oral, 2 Times Daily      Omega-3 1000 MG capsule  Notes to patient:  Next dose due 10/19/18 at 9AM   1 capsule, Oral, Daily      omeprazole 20 MG capsule  Commonly known as:  priLOSEC   20 mg, Oral, Daily      sertraline 50 MG tablet  Commonly known as:  ZOLOFT   50 mg, Oral, Daily             Discharge Diet:   Diet Instructions     Advance Diet As Tolerated             Activity at Discharge:   Activity Instructions     Activity as Tolerated       PT/OT          Follow-up Appointments:   PCP in 1 week after discharge from [facility]          This document has been electronically signed by Galen Blunt MD on October 19, 2018 10:13 AM      Time: 35 min

## 2018-10-19 NOTE — PLAN OF CARE
Problem: Patient Care Overview  Goal: Plan of Care Review  Outcome: Ongoing (interventions implemented as appropriate)   10/19/18 9671   Coping/Psychosocial   Plan of Care Reviewed With patient   Plan of Care Review   Progress improving   OTHER   Outcome Summary Pt performed dressing toileting and bathing. All goals met this day.

## 2018-10-19 NOTE — SIGNIFICANT NOTE
10/19/18 0955   Rehab Treatment   Discipline physical therapy assistant   Reason Treatment Not Performed unavailable for treatment  (pt ready to work with [facility] @ present time)

## 2018-10-19 NOTE — THERAPY TREATMENT NOTE
Acute Care - Physical Therapy Treatment Note  [facility]     Patient Name: Mariaa Pires  : 4/10/1926  MRN: [id number]  Today's Date: 10/19/2018  Onset of Illness/Injury or Date of Surgery: 10/13/18  Date of Referral to PT: 10/15/18  Referring Physician: Dr. North    Admit Date: 10/13/2018    Visit Dx:    ICD-10-CM ICD-9-CM   1. Acute on chronic respiratory failure with hypoxia (CMS/HCC) J96.21 518.84     799.02   2. Pneumonia due to infectious organism, unspecified laterality, unspecified part of lung J18.9 136.9     484.8   3. NSTEMI (non-ST elevated myocardial infarction) (CMS/[facility]) I21.4 410.70   4. Oropharyngeal dysphagia R13.12 787.22   5. Impaired mobility and ADLs Z74.09 799.89   6. Impaired functional mobility, balance, gait, and endurance Z74.09 V49.89     Patient Active Problem List   Diagnosis   • Pneumonia   • Bilateral pleural effusion   • Osteoporosis   • Acute respiratory failure with hypoxia and hypercapnia (CMS/[facility])   • Diabetes mellitus (CMS/[facility])   • Hypertension   • Pulmonary hypertension (CMS/[facility])   • Aortic valve stenosis       Therapy Treatment          Rehabilitation Treatment Summary     Row Name 10/19/18 1056             Treatment Time/Intention    Discipline physical therapy assistant  -TA      Document Type therapy note (daily note)  -TA      Subjective Information no complaints  -TA      Mode of Treatment individual therapy;physical therapy  -TA      Therapy Frequency (PT Clinical Impression) other (see comments)   5-7x/week  -TA      Patient Effort good  -TA      Existing Precautions/Restrictions fall;oxygen therapy device and L/min  -TA      Recorded by [TA] Annel Justice PTA 10/19/18 1151      Row Name 10/19/18 1056             Vital Signs    Pre Systolic BP Rehab 141  -TA      Pre Treatment Diastolic BP 63  -TA      Post Systolic BP Rehab 184  -TA      Post Treatment Diastolic BP 74  -TA      Pretreatment Heart Rate (beats/min) 85  -TA      Posttreatment Heart Rate  (beats/min) 81  -TA      Pre SpO2 (%) 96  -TA      O2 Delivery Pre Treatment supplemental O2  -TA      Post SpO2 (%) 100  -TA      O2 Delivery Post Treatment supplemental O2  -TA      Pre Patient Position Supine  -TA      Post Patient Position Supine  -TA      Recorded by [TA] Annel Justice, PTA 10/19/18 1151      Row Name 10/19/18 1056             Cognitive Assessment/Intervention- PT/OT    Affect/Mental Status (Cognitive) WFL  -TA      Orientation Status (Cognition) oriented x 4  -TA      Follows Commands (Cognition) follows one step commands;75-90% accuracy;delayed response/completion;repetition of directions required  -TA      Safety Deficit (Cognitive) safety precautions awareness  -TA      Personal Safety Interventions fall prevention program maintained;gait belt;supervised activity;nonskid shoes/slippers when out of bed  -TA      Recorded by [TA] Annel Justice, PTA 10/19/18 1151      Row Name 10/19/18 1056             Bed Mobility Assessment/Treatment    Supine-Sit [place] (Bed Mobility) supervision  -TA      Sit-Supine [place] (Bed Mobility) supervision  -TA      Recorded by [TA] Annel Justice, PTA 10/19/18 1151      Row Name 10/19/18 1056             Functional Mobility    Functional Mobility- Ind. Level contact guard assist  -TA      Functional Mobility- Device rolling walker  -TA      Functional Mobility-Distance (Feet) 6  -TA      Recorded by [TA] Annel Justice, PTA 10/19/18 1151      Row Name 10/19/18 1056             Sit-Stand Transfer    Sit-Stand [place] (Transfers) contact guard  -TA      Assistive Device (Sit-Stand Transfers) walker, front-wheeled  -TA      Recorded by [TA] Anenl Justice, PTA 10/19/18 1151      Row Name 10/19/18 1056             Stand-Sit Transfer    Stand-Sit [place] (Transfers) contact guard  -TA      Assistive Device (Stand-Sit Transfers) walker, front-wheeled  -TA      Recorded by [TA] Annel Justice, PTA 10/19/18 1151      Row Name 10/19/18 1056              Toilet Transfer    Type (Toilet Transfer) sit-stand;stand-sit  -TA      [place] Level (Toilet Transfer) minimum assist (75% patient effort)  -TA2      Assistive Device (Toilet Transfer) walker, front-wheeled  -TA2      Recorded by [TA] Annel uJstice, PTA 10/19/18 1156  [TA2] Annel Justice, [facility] 10/19/18 1151      Row Name 10/19/18 1056             Gait/Stairs Assessment/Training    Gait/Stairs Assessment/Training gait/ambulation assistive device  -TA      [place] Level (Gait) contact guard  -TA      Assistive Device (Gait) walker, front-wheeled  -TA      Distance in Feet (Gait) 150`  -TA      Deviations/Abnormal Patterns (Gait) base of support, wide;gait speed decreased;stride length decreased  -TA      Recorded by [TA] Annel Justice, PTA 10/19/18 1151      Row Name 10/19/18 1056             Positioning and Restraints    Pre-Treatment Position in bed  -TA      Post Treatment Position bed  -TA      In Bed supine;call light within reach;exit alarm on;with family/caregiver  -TA      Recorded by [TA] Annel Justice, PTA 10/19/18 1151      Row Name 10/19/18 1056             Pain Scale: Numbers Pre/Post-Treatment    Pain Scale: Numbers, Pretreatment 6/10  -TA      Pain Scale: Numbers, Post-Treatment 6/10  -TA      Pain Location throat  -TA      Recorded by [TA] Annel Justice, PTA 10/19/18 1151      Row Name 10/19/18 1056             Outcome Summary/Treatment Plan (PT)    Daily Summary of Progress (PT) progress toward functional goals is good  -TA      Plan for Continued Treatment (PT) continue  -TA      Anticipated Discharge Disposition (PT) skilled nursing facility;home with 24/7 care  -TA      Recorded by [TA] Annel Justice, PTA 10/19/18 1151        User Key  (r) = Recorded By, (t) = Taken By, (c) = Cosigned By    Initials Name Effective Dates Discipline    TA Annel Justice, PTA 03/07/18 -  PT                       PT Rehab Goals     Row Name 10/19/18 1056             Bed Mobility Goal  1 (PT)    Activity/Assistive Device (Bed Mobility Goal 1, PT) bed mobility activities, all  -TA      [place] Level/Cues Needed (Bed Mobility Goal 1, PT) conditional independence  -TA      Time Frame (Bed Mobility Goal 1, PT) 2 days  -TA      Progress/Outcomes (Bed Mobility Goal 1, PT) goal not met  -TA         Transfer Goal 1 (PT)    Activity/Assistive Device (Transfer Goal 1, PT) sit-to-stand/stand-to-sit  -TA      [place] Level/Cues Needed (Transfer Goal 1, PT) supervision required;conditional independence  -TA      Time Frame (Transfer Goal 1, PT) 2 days  -TA      Progress/Outcome (Transfer Goal 1, PT) goal not met  -TA         Gait Training Goal 1 (PT)    Activity/Assistive Device (Gait Training Goal 1, PT) gait (walking locomotion);assistive device use;decrease fall risk;diminish gait deviation;improve balance and speed;increase endurance/gait distance;increase energy conservation  -TA      [place] Level (Gait Training Goal 1, PT) minimum assist (75% or more patient effort);moderate assist (50-74% patient effort)  -TA      Distance (Gait Goal 1, PT) 100  -TA      Time Frame (Gait Training Goal 1, PT) 2 - 3 days  -TA      Progress/Outcome (Gait Training Goal 1, PT) goal met  -TA         Stairs Goal 1 (PT)    Activity/Assistive Device (Stairs Goal 1, PT) ascending stairs;descending stairs;decrease fall risk  -TA      [place] Level/Cues Needed (Stairs Goal 1, PT) minimum assist (75% or more patient effort)  -TA      Number of Stairs (Stairs Goal 1, PT) 1 stair to be able to get into/out of family room  -TA      Time Frame (Stairs Goal 1, PT) long term goal (LTG)  -TA      Progress/Outcome (Stairs Goal 1, PT) goal not met  -TA        User Key  (r) = Recorded By, (t) = Taken By, (c) = Cosigned By    Initials Name Provider Type Discipline    Annel Hawk PTA Physical Therapy Assistant PT          Physical Therapy Education     Title: PT OT SLP Therapies (Active)     Topic: Physical Therapy  (Active)     Point: Mobility training (Done)    Learning Progress Summary     Learner Status Readiness Method Response Comment Documented by    Patient Done Acceptance E VU,NR Appropriate AD use, hand placement during t/f and mobility, deep breathing  10/18/18 1357     Done Acceptance E VU,NR   10/17/18 1452     Done Acceptance E,D VU,NR Role of PT in acute care, use of gait belt, use of RW, deep breathing.  10/16/18 1428    Family Done Acceptance E,D VU,NR Role of PT in acute care, use of gait belt, use of RW, deep breathing.  10/16/18 1428          Point: Body mechanics (Done)    Learning Progress Summary     Learner Status Readiness Method Response Comment Documented by    Patient Done Acceptance E VU,NR Appropriate AD use, hand placement during t/f and mobility, deep breathing  10/18/18 1357     Done Acceptance E VU,NR   10/17/18 1452     Done Acceptance E,D VU,NR Role of PT in acute care, use of gait belt, use of RW, deep breathing.  10/16/18 1428    Family Done Acceptance E,D VU,NR Role of PT in acute care, use of gait belt, use of RW, deep breathing.  10/16/18 1428          Point: Precautions (Done)    Learning Progress Summary     Learner Status Readiness Method Response Comment Documented by    Patient Done Acceptance E VU,NR Appropriate AD use, hand placement during t/f and mobility, deep breathing  10/18/18 1357     Done Acceptance E VU,NR   10/17/18 1452     Done Acceptance E,D VU,NR Role of PT in acute care, use of gait belt, use of RW, deep breathing.  10/16/18 1428    Family Done Acceptance E,D VU,NR Role of PT in acute care, use of gait belt, use of RW, deep breathing.  10/16/18 1428                      User Key     Initials Effective Dates Name Provider Type Discipline     03/07/18 -  Isac Thacker PTA Physical Therapy Assistant PT     10/04/18 -  Candida Biggs PT Physical Therapist PT                    PT Recommendation and Plan  Anticipated Discharge  Disposition (PT): skilled nursing facility, home with 24/7 care  Therapy Frequency (PT Clinical Impression): other (see comments) (5-7x/week)  Outcome Summary/Treatment Plan (PT)  Daily Summary of Progress (PT): progress toward functional goals is good  Plan for Continued Treatment (PT): continue  Anticipated Discharge Disposition (PT): skilled nursing facility, home with 24/7 care  Progress: improving  Outcome Summary: pt sup<>sit with SBA, sit<>stand with CGA, pt ambulated 150` with RW & CGA, pt would benefit from 24/7 care & continued PT services          Outcome Measures     Row Name 10/19/18 1100 10/18/18 1335 10/18/18 1044       How much help from another person do you currently need...    Turning from your back to your side while in flat bed without using bedrails? 4  -TA  -- 4  -CW    Moving from lying on back to sitting on the side of a flat bed without bedrails? 3  -TA  -- 3  -CW    Moving to and from a bed to a chair (including a wheelchair)? 3  -TA  -- 3  -CW    Standing up from a chair using your arms (e.g., wheelchair, bedside chair)? 3  -TA  -- 3  -CW    Climbing 3-5 steps with a railing? 2  -TA  -- 2  -CW    To walk in hospital room? 3  -TA  -- 3  -CW    AM-PAC 6 Clicks Score 18  -TA  -- 18  -CW       How much help from another is currently needed...    Putting on and taking off regular lower body clothing?  -- 2  -LW  --    Bathing (including washing, rinsing, and drying)  -- 2  -LW  --    Toileting (which includes using toilet bed pan or urinal)  -- 2  -LW  --    Putting on and taking off regular upper body clothing  -- 2  -LW  --    Taking care of personal grooming (such as brushing teeth)  -- 3  -LW  --    Eating meals  -- 3  -LW  --    Score  -- 14  -LW  --       Functional Assessment    Outcome Measure Options AM-PAC 6 Clicks Basic Mobility (PT)  -TA  -- AM-PAC 6 Clicks Basic Mobility (PT)  -CW    Row Name 10/17/18 1345 10/17/18 1105          How much help from another person do you currently  need...    Turning from your back to your side while in flat bed without using bedrails? 3  -JA  --     Moving from lying on back to sitting on the side of a flat bed without bedrails? 3  -JA  --     Moving to and from a bed to a chair (including a wheelchair)? 3  -JA  --     Standing up from a chair using your arms (e.g., wheelchair, bedside chair)? 3  -JA  --     Climbing 3-5 steps with a railing? 2  -JA  --     To walk in hospital room? 3  -JA  --     AM-PAC 6 Clicks Score 17  -JA  --        How much help from another is currently needed...    Putting on and taking off regular lower body clothing?  -- 2  -LW     Bathing (including washing, rinsing, and drying)  -- 2  -LW     Toileting (which includes using toilet bed pan or urinal)  -- 2  -LW     Putting on and taking off regular upper body clothing  -- 2  -LW     Taking care of personal grooming (such as brushing teeth)  -- 3  -LW     Eating meals  -- 3  -LW     Score  -- 14  -LW        Functional Assessment    Outcome Measure Options AM-PAC 6 Clicks Basic Mobility (PT)  -JA  --       User Key  (r) = Recorded By, (t) = Taken By, (c) = Cosigned By    Initials Name Provider Type    Isac Coles PTA Physical Therapy Assistant    Annel Hawk PTA Physical Therapy Assistant    Alexa Baumann, MONSIVAIS/L Occupational Therapy Assistant    Candida Gonzalez, PT Physical Therapist           Time Calculation:         PT Charges     Row Name 10/19/18 1156             Time Calculation    Start Time 1056  -TA      Stop Time 1137  -TA      Time Calculation (min) 41 min  -TA         Time Calculation- PT    Total Timed Code Minutes- PT 41 minute(s)  -TA        User Key  (r) = Recorded By, (t) = Taken By, (c) = Cosigned By    Initials Name Provider Type    Annel Hawk PTA Physical Therapy Assistant        Therapy Suggested Charges     Code   Minutes Charges    38363 (CPT®) Hc Pt Neuromusc Re Education Ea 15 Min      46076 (CPT®) Hc Pt Ther Proc Ea  15 Min      86703 (CPT®) Hc Gait Training Ea 15 Min 19 1    09549 (CPT®) Hc Pt Therapeutic Act Ea 15 Min 15 1    35201 (CPT®) Hc Pt Manual Therapy Ea 15 Min      14124 (CPT®) Hc Pt Iontophoresis Ea 15 Min      79240 (CPT®) Hc Pt Elec Stim Ea-Per 15 Min      32358 (CPT®) Hc Pt Ultrasound Ea 15 Min      46758 (CPT®) Hc Pt Self Care/Mgmt/Train Ea 15 Min      52701 (CPT®) Hc Pt Prosthetic (S) Train Initial Encounter, Each 15 Min      63320 (CPT®) Hc Pt Orthotic(S)/Prosthetic(S) Encounter, Each 15 Min      57050 (CPT®) Hc Orthotic(S) Mgmt/Train Initial Encounter, Each 15min      Total  34 2        Therapy Charges for Today     Code Description Service Date Service Provider Modifiers Qty    82082348369 HC GAIT TRAINING EA 15 MIN 10/19/2018 Annel Justice, PTA GP 1    56322312447 HC PT THERAPEUTIC ACT EA 15 MIN 10/19/2018 Annel Justice, PTA GP 2          PT G-Codes  PT Professional Judgement Used?: Yes  Outcome Measure Options: AM-PAC 6 Clicks Basic Mobility (PT)  AM-PAC 6 Clicks Score: 18  Score: 14  Functional Limitation: Mobility: Walking and moving around  Mobility: Walking and Moving Around Current Status (): At least 40 percent but less than 60 percent impaired, limited or restricted  Mobility: Walking and Moving Around Goal Status (): At least 20 percent but less than 40 percent impaired, limited or restricted    Annel Justice PTA  10/19/2018

## 2018-10-19 NOTE — DISCHARGE INSTR - APPOINTMENTS
Follow up with PCP  Dr. Mcfarland  October 25, 2018 1:00 pm    If you have any questions or concerns please call

## 2020-12-11 NOTE — CONSULTS
Adult Nutrition  Assessment    Patient Name:  Mariaa Pires  YOB: 1926  MRN: [id number]  Admit Date:  10/13/2018    Assessment Date:  10/17/2018    Comments:  Pt reports fair appetite.  PO intakes usually 50-75%.  Pt indicates she has a sore throat making swallowing difficult so she has been getting softer foods.  SLP has evaluated.  Pt drinks Boost GC received on trays.  Wt up since admit.  Pt reports she has not had a  BM in a few days.  RD provided prune juice.  Palliative care consulted and on board.  RD will monitor and provide services as needed.          Reason for Assessment     Row Name 10/17/18 1522          Reason for Assessment    Reason For Assessment follow-up protocol               Nutrition/Diet History     Row Name 10/17/18 1522          Nutrition/Diet History    Typical Food/Fluid Intake Pt reports fair appetite but states she continues to have a sore throat.  Has been eating softer foods.  Receiving Boost on trays.               Labs/Tests/Procedures/Meds     Row Name 10/17/18 1523          Labs/Procedures/Meds    Lab Results Reviewed reviewed, pertinent        Medications    Pertinent Medications Reviewed reviewed, pertinent     Pertinent Medications Comments lasix             Physical Findings     Row Name 10/17/18 1523          Physical Findings    Overall Physical Appearance generalized wasting;underweight               Nutrition Prescription Ordered     Row Name 10/17/18 1524          Nutrition Prescription PO    Current PO Diet Regular     Common Modifiers Cardiac             Evaluation of Received Nutrient/Fluid Intake     Row Name 10/17/18 1524          PO Evaluation    Number of Days PO Intake Evaluated 2 days     % PO Intake 50-75; 25 x 1             Electronically signed by:  Zoya Mayer RD  10/17/18 3:24 PM   None